# Patient Record
Sex: FEMALE | Race: WHITE | NOT HISPANIC OR LATINO | Employment: UNEMPLOYED | URBAN - METROPOLITAN AREA
[De-identification: names, ages, dates, MRNs, and addresses within clinical notes are randomized per-mention and may not be internally consistent; named-entity substitution may affect disease eponyms.]

---

## 2017-01-30 ENCOUNTER — HOSPITAL ENCOUNTER (EMERGENCY)
Facility: HOSPITAL | Age: 27
Discharge: HOME | End: 2017-01-31
Attending: EMERGENCY MEDICINE | Admitting: EMERGENCY MEDICINE

## 2017-01-30 ENCOUNTER — APPOINTMENT (EMERGENCY)
Dept: CT IMAGING | Facility: HOSPITAL | Age: 27
End: 2017-01-30

## 2017-01-30 DIAGNOSIS — S39.012A BACK STRAIN, INITIAL ENCOUNTER: ICD-10-CM

## 2017-01-30 DIAGNOSIS — S16.1XXA CERVICAL STRAIN, INITIAL ENCOUNTER: ICD-10-CM

## 2017-01-30 DIAGNOSIS — S06.0X9A CONCUSSION, WITH LOSS OF CONSCIOUSNESS OF UNSPECIFIED DURATION, INITIAL ENCOUNTER: Primary | ICD-10-CM
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
## 2017-01-30 LAB
ALBUMIN SERPL BCP-MCNC: 3.6 G/DL (ref 3.5–5)
ALP SERPL-CCNC: 82 U/L (ref 46–116)
ALT SERPL W P-5'-P-CCNC: 23 U/L (ref 12–78)
ANION GAP SERPL CALCULATED.3IONS-SCNC: 8 MMOL/L (ref 4–13)
AST SERPL W P-5'-P-CCNC: 23 U/L (ref 5–45)
BASOPHILS # BLD AUTO: 0.06 THOUSANDS/ΜL (ref 0–0.1)
BASOPHILS NFR BLD AUTO: 1 % (ref 0–1)
BILIRUB SERPL-MCNC: 0.2 MG/DL (ref 0.2–1)
BUN SERPL-MCNC: 11 MG/DL (ref 5–25)
CALCIUM SERPL-MCNC: 8.9 MG/DL (ref 8.3–10.1)
CHLORIDE SERPL-SCNC: 104 MMOL/L (ref 100–108)
CO2 SERPL-SCNC: 28 MMOL/L (ref 21–32)
CREAT SERPL-MCNC: 0.74 MG/DL (ref 0.6–1.3)
EOSINOPHIL # BLD AUTO: 0.64 THOUSAND/ΜL (ref 0–0.61)
EOSINOPHIL NFR BLD AUTO: 8 % (ref 0–6)
ERYTHROCYTE [DISTWIDTH] IN BLOOD BY AUTOMATED COUNT: 12.5 % (ref 11.6–15.1)
GFR SERPL CREATININE-BSD FRML MDRD: >60 ML/MIN/1.73SQ M
GLUCOSE SERPL-MCNC: 85 MG/DL (ref 65–140)
HCT VFR BLD AUTO: 38 % (ref 34.8–46.1)
HGB BLD-MCNC: 12.9 G/DL (ref 11.5–15.4)
LYMPHOCYTES # BLD AUTO: 2.75 THOUSANDS/ΜL (ref 0.6–4.47)
LYMPHOCYTES NFR BLD AUTO: 33 % (ref 14–44)
MCH RBC QN AUTO: 30.8 PG (ref 26.8–34.3)
MCHC RBC AUTO-ENTMCNC: 33.9 G/DL (ref 31.4–37.4)
MCV RBC AUTO: 91 FL (ref 82–98)
MONOCYTES # BLD AUTO: 0.64 THOUSAND/ΜL (ref 0.17–1.22)
MONOCYTES NFR BLD AUTO: 8 % (ref 4–12)
NEUTROPHILS # BLD AUTO: 4.18 THOUSANDS/ΜL (ref 1.85–7.62)
NEUTS SEG NFR BLD AUTO: 50 % (ref 43–75)
PLATELET # BLD AUTO: 313 THOUSANDS/UL (ref 149–390)
PMV BLD AUTO: 10 FL (ref 8.9–12.7)
POTASSIUM SERPL-SCNC: 3.4 MMOL/L (ref 3.5–5.3)
PROT SERPL-MCNC: 6.7 G/DL (ref 6.4–8.2)
RBC # BLD AUTO: 4.19 MILLION/UL (ref 3.81–5.12)
SODIUM SERPL-SCNC: 140 MMOL/L (ref 136–145)
WBC # BLD AUTO: 8.27 THOUSAND/UL (ref 4.31–10.16)

## 2017-01-30 PROCEDURE — 72128 CT CHEST SPINE W/O DYE: CPT

## 2017-01-30 PROCEDURE — 81025 URINE PREGNANCY TEST: CPT | Performed by: EMERGENCY MEDICINE

## 2017-01-30 PROCEDURE — 85025 COMPLETE CBC W/AUTO DIFF WBC: CPT | Performed by: EMERGENCY MEDICINE

## 2017-01-30 PROCEDURE — 84702 CHORIONIC GONADOTROPIN TEST: CPT | Performed by: EMERGENCY MEDICINE

## 2017-01-30 PROCEDURE — 72125 CT NECK SPINE W/O DYE: CPT

## 2017-01-30 PROCEDURE — 70450 CT HEAD/BRAIN W/O DYE: CPT

## 2017-01-30 PROCEDURE — 96361 HYDRATE IV INFUSION ADD-ON: CPT

## 2017-01-30 PROCEDURE — 72131 CT LUMBAR SPINE W/O DYE: CPT

## 2017-01-30 PROCEDURE — 96375 TX/PRO/DX INJ NEW DRUG ADDON: CPT

## 2017-01-30 PROCEDURE — 80053 COMPREHEN METABOLIC PANEL: CPT | Performed by: EMERGENCY MEDICINE

## 2017-01-30 PROCEDURE — 96374 THER/PROPH/DIAG INJ IV PUSH: CPT

## 2017-01-30 PROCEDURE — 36415 COLL VENOUS BLD VENIPUNCTURE: CPT | Performed by: EMERGENCY MEDICINE

## 2017-01-30 RX ORDER — CLONAZEPAM 1 MG/1
1 TABLET ORAL
COMMUNITY
End: 2018-11-05 | Stop reason: DRUGHIGH

## 2017-01-30 RX ORDER — ONDANSETRON 2 MG/ML
4 INJECTION INTRAMUSCULAR; INTRAVENOUS ONCE
Status: COMPLETED | OUTPATIENT
Start: 2017-01-30 | End: 2017-01-30

## 2017-01-30 RX ORDER — CLONAZEPAM 0.5 MG/1
0.5 TABLET ORAL DAILY PRN
COMMUNITY
End: 2019-02-19

## 2017-01-30 RX ORDER — MULTIVITAMIN
1 TABLET ORAL DAILY
COMMUNITY
End: 2018-11-05 | Stop reason: ALTCHOICE

## 2017-01-30 RX ORDER — MORPHINE SULFATE 2 MG/ML
2 INJECTION, SOLUTION INTRAMUSCULAR; INTRAVENOUS ONCE
Status: COMPLETED | OUTPATIENT
Start: 2017-01-30 | End: 2017-01-30

## 2017-01-30 RX ADMIN — ONDANSETRON 4 MG: 2 INJECTION INTRAMUSCULAR; INTRAVENOUS at 23:39

## 2017-01-30 RX ADMIN — MORPHINE SULFATE 2 MG: 2 INJECTION, SOLUTION INTRAMUSCULAR; INTRAVENOUS at 23:40

## 2017-01-30 RX ADMIN — SODIUM CHLORIDE 1000 ML: 0.9 INJECTION, SOLUTION INTRAVENOUS at 23:39

## 2017-01-31 VITALS
WEIGHT: 160.94 LBS | TEMPERATURE: 98 F | OXYGEN SATURATION: 100 % | RESPIRATION RATE: 18 BRPM | BODY MASS INDEX: 27.48 KG/M2 | SYSTOLIC BLOOD PRESSURE: 128 MMHG | HEART RATE: 88 BPM | HEIGHT: 64 IN | DIASTOLIC BLOOD PRESSURE: 80 MMHG

## 2017-01-31 LAB
AMPHETAMINES SERPL QL SCN: POSITIVE
B-HCG SERPL-ACNC: <2 MIU/ML
BACTERIA UR QL AUTO: ABNORMAL /HPF
BARBITURATES UR QL: NEGATIVE
BENZODIAZ UR QL: POSITIVE
BILIRUB UR QL STRIP: ABNORMAL
CLARITY UR: ABNORMAL
COCAINE UR QL: NEGATIVE
COLOR UR: YELLOW
GLUCOSE UR STRIP-MCNC: NEGATIVE MG/DL
HCG UR QL: NEGATIVE
HGB UR QL STRIP.AUTO: ABNORMAL
KETONES UR STRIP-MCNC: ABNORMAL MG/DL
LEUKOCYTE ESTERASE UR QL STRIP: NEGATIVE
METHADONE UR QL: NEGATIVE
MUCOUS THREADS UR QL AUTO: ABNORMAL
NITRITE UR QL STRIP: NEGATIVE
NON-SQ EPI CELLS URNS QL MICRO: ABNORMAL /HPF
OPIATES UR QL SCN: POSITIVE
PCP UR QL: NEGATIVE
PH UR STRIP.AUTO: 6 [PH] (ref 4.5–8)
PROT UR STRIP-MCNC: NEGATIVE MG/DL
RBC #/AREA URNS AUTO: ABNORMAL /HPF
SP GR UR STRIP.AUTO: >=1.03 (ref 1–1.03)
THC UR QL: NEGATIVE
UROBILINOGEN UR QL STRIP.AUTO: 0.2 E.U./DL
WBC #/AREA URNS AUTO: ABNORMAL /HPF

## 2017-01-31 PROCEDURE — 99284 EMERGENCY DEPT VISIT MOD MDM: CPT

## 2017-01-31 PROCEDURE — 96376 TX/PRO/DX INJ SAME DRUG ADON: CPT

## 2017-01-31 PROCEDURE — 96375 TX/PRO/DX INJ NEW DRUG ADDON: CPT

## 2017-01-31 PROCEDURE — 87086 URINE CULTURE/COLONY COUNT: CPT | Performed by: EMERGENCY MEDICINE

## 2017-01-31 PROCEDURE — 80307 DRUG TEST PRSMV CHEM ANLYZR: CPT | Performed by: EMERGENCY MEDICINE

## 2017-01-31 PROCEDURE — 81001 URINALYSIS AUTO W/SCOPE: CPT | Performed by: EMERGENCY MEDICINE

## 2017-01-31 RX ORDER — NALOXONE HYDROCHLORIDE 0.4 MG/ML
INJECTION, SOLUTION INTRAMUSCULAR; INTRAVENOUS; SUBCUTANEOUS
Status: DISCONTINUED
Start: 2017-01-31 | End: 2017-01-31 | Stop reason: WASHOUT

## 2017-01-31 RX ORDER — ONDANSETRON 2 MG/ML
4 INJECTION INTRAMUSCULAR; INTRAVENOUS ONCE
Status: COMPLETED | OUTPATIENT
Start: 2017-01-31 | End: 2017-01-31

## 2017-01-31 RX ORDER — NALOXONE HYDROCHLORIDE 1 MG/ML
2 INJECTION PARENTERAL ONCE
Status: COMPLETED | OUTPATIENT
Start: 2017-01-31 | End: 2017-01-31

## 2017-01-31 RX ORDER — ONDANSETRON 2 MG/ML
INJECTION INTRAMUSCULAR; INTRAVENOUS
Status: COMPLETED
Start: 2017-01-31 | End: 2017-01-31

## 2017-01-31 RX ORDER — NALOXONE HYDROCHLORIDE 1 MG/ML
INJECTION INTRAMUSCULAR; INTRAVENOUS; SUBCUTANEOUS
Status: DISCONTINUED
Start: 2017-01-31 | End: 2017-01-31 | Stop reason: HOSPADM

## 2017-01-31 RX ORDER — NAPROXEN SODIUM 550 MG/1
550 TABLET ORAL 2 TIMES DAILY WITH MEALS
Qty: 20 TABLET | Refills: 0 | Status: SHIPPED | OUTPATIENT
Start: 2017-01-31 | End: 2018-12-01

## 2017-01-31 RX ORDER — METOCLOPRAMIDE 10 MG/1
10 TABLET ORAL 4 TIMES DAILY
Qty: 28 TABLET | Refills: 0 | Status: SHIPPED | OUTPATIENT
Start: 2017-01-31 | End: 2017-02-07

## 2017-01-31 RX ORDER — CLONAZEPAM 0.5 MG/1
1 TABLET ORAL ONCE
Status: COMPLETED | OUTPATIENT
Start: 2017-01-31 | End: 2017-01-31

## 2017-01-31 RX ADMIN — ONDANSETRON 4 MG: 2 INJECTION INTRAMUSCULAR; INTRAVENOUS at 01:28

## 2017-01-31 RX ADMIN — CLONAZEPAM 1 MG: 0.5 TABLET ORAL at 00:29

## 2017-01-31 RX ADMIN — NALOXONE HYDROCHLORIDE 2 MG: 1 INJECTION PARENTERAL at 01:15

## 2017-02-01 LAB — BACTERIA UR CULT: NORMAL

## 2017-02-22 ENCOUNTER — APPOINTMENT (OUTPATIENT)
Dept: LAB | Facility: MEDICAL CENTER | Age: 27
End: 2017-02-22
Attending: PHYSICIAN ASSISTANT

## 2017-02-22 ENCOUNTER — TRANSCRIBE ORDERS (OUTPATIENT)
Dept: URGENT CARE | Facility: MEDICAL CENTER | Age: 27
End: 2017-02-22

## 2017-02-22 DIAGNOSIS — Z00.8 HEALTH EXAMINATION IN POPULATION SURVEY: Primary | ICD-10-CM

## 2017-02-22 DIAGNOSIS — Z00.8 HEALTH EXAMINATION IN POPULATION SURVEY: ICD-10-CM

## 2017-02-22 LAB — RUBV IGG SERPL IA-ACNC: 174.7 IU/ML

## 2017-02-22 PROCEDURE — 86787 VARICELLA-ZOSTER ANTIBODY: CPT

## 2017-02-22 PROCEDURE — 86735 MUMPS ANTIBODY: CPT

## 2017-02-22 PROCEDURE — 86765 RUBEOLA ANTIBODY: CPT

## 2017-02-22 PROCEDURE — 86762 RUBELLA ANTIBODY: CPT

## 2017-02-22 PROCEDURE — 86480 TB TEST CELL IMMUN MEASURE: CPT

## 2017-02-22 PROCEDURE — 36415 COLL VENOUS BLD VENIPUNCTURE: CPT

## 2017-02-23 LAB
MEV IGG SER QL: NORMAL
MUV IGG SER QL: NORMAL
VZV IGG SER IA-ACNC: NORMAL

## 2017-02-27 LAB — QUANTIFERON-TB GOLD IN TUBE: NORMAL

## 2017-03-22 ENCOUNTER — HOSPITAL ENCOUNTER (EMERGENCY)
Facility: HOSPITAL | Age: 27
Discharge: HOME | End: 2017-03-22
Attending: EMERGENCY MEDICINE | Admitting: EMERGENCY MEDICINE
Payer: COMMERCIAL

## 2017-03-22 VITALS
RESPIRATION RATE: 20 BRPM | DIASTOLIC BLOOD PRESSURE: 60 MMHG | HEART RATE: 113 BPM | BODY MASS INDEX: 25.06 KG/M2 | OXYGEN SATURATION: 99 % | TEMPERATURE: 98.4 F | SYSTOLIC BLOOD PRESSURE: 129 MMHG | WEIGHT: 146 LBS

## 2017-03-22 DIAGNOSIS — L02.01 FACIAL ABSCESS: Primary | ICD-10-CM

## 2017-03-22 PROCEDURE — 99283 EMERGENCY DEPT VISIT LOW MDM: CPT

## 2017-03-22 RX ORDER — LIDOCAINE HYDROCHLORIDE AND EPINEPHRINE 10; 10 MG/ML; UG/ML
1 INJECTION, SOLUTION INFILTRATION; PERINEURAL ONCE
Status: COMPLETED | OUTPATIENT
Start: 2017-03-22 | End: 2017-03-22

## 2017-03-22 RX ORDER — CLINDAMYCIN HYDROCHLORIDE 300 MG/1
300 CAPSULE ORAL 3 TIMES DAILY
Qty: 30 CAPSULE | Refills: 0 | Status: SHIPPED | OUTPATIENT
Start: 2017-03-22 | End: 2017-04-01

## 2017-03-22 RX ORDER — ACETAMINOPHEN 325 MG/1
650 TABLET ORAL ONCE
Status: COMPLETED | OUTPATIENT
Start: 2017-03-22 | End: 2017-03-22

## 2017-03-22 RX ORDER — IBUPROFEN 600 MG/1
600 TABLET ORAL ONCE
Status: COMPLETED | OUTPATIENT
Start: 2017-03-22 | End: 2017-03-22

## 2017-03-22 RX ADMIN — LIDOCAINE HYDROCHLORIDE AND EPINEPHRINE 1 ML: 10; 10 INJECTION, SOLUTION INFILTRATION; PERINEURAL at 19:03

## 2017-03-22 RX ADMIN — IBUPROFEN 600 MG: 600 TABLET ORAL at 18:40

## 2017-03-22 RX ADMIN — ACETAMINOPHEN 650 MG: 325 TABLET ORAL at 18:40

## 2017-03-27 ENCOUNTER — HOSPITAL ENCOUNTER (EMERGENCY)
Facility: HOSPITAL | Age: 27
Discharge: HOME | End: 2017-03-27
Attending: EMERGENCY MEDICINE | Admitting: EMERGENCY MEDICINE
Payer: COMMERCIAL

## 2017-03-27 VITALS
OXYGEN SATURATION: 100 % | SYSTOLIC BLOOD PRESSURE: 125 MMHG | RESPIRATION RATE: 18 BRPM | DIASTOLIC BLOOD PRESSURE: 91 MMHG | HEART RATE: 87 BPM

## 2017-03-27 DIAGNOSIS — L02.01 FACIAL ABSCESS: Primary | ICD-10-CM

## 2017-03-27 DIAGNOSIS — Z09 ENCOUNTER FOR RECHECK OF ABSCESS FOLLOWING INCISION AND DRAINAGE: ICD-10-CM

## 2017-03-27 LAB — HCG UR QL: NEGATIVE

## 2017-03-27 PROCEDURE — 99283 EMERGENCY DEPT VISIT LOW MDM: CPT

## 2017-03-27 PROCEDURE — 81025 URINE PREGNANCY TEST: CPT | Performed by: EMERGENCY MEDICINE

## 2017-03-27 RX ORDER — LEVONORGESTREL 1.5 MG/1
1.5 TABLET ORAL ONCE
Qty: 1 TABLET | Refills: 0 | Status: SHIPPED | OUTPATIENT
Start: 2017-03-27 | End: 2017-03-27

## 2017-07-24 ENCOUNTER — OFFICE VISIT (OUTPATIENT)
Dept: URGENT CARE | Facility: MEDICAL CENTER | Age: 27
End: 2017-07-24
Payer: COMMERCIAL

## 2017-07-24 DIAGNOSIS — J02.9 ACUTE PHARYNGITIS: ICD-10-CM

## 2017-07-24 PROCEDURE — 99283 EMERGENCY DEPT VISIT LOW MDM: CPT

## 2017-07-24 PROCEDURE — G0382 LEV 3 HOSP TYPE B ED VISIT: HCPCS

## 2017-12-07 ENCOUNTER — OFFICE VISIT (OUTPATIENT)
Dept: URGENT CARE | Facility: MEDICAL CENTER | Age: 27
End: 2017-12-07
Payer: COMMERCIAL

## 2017-12-07 PROCEDURE — 99283 EMERGENCY DEPT VISIT LOW MDM: CPT

## 2017-12-07 PROCEDURE — G0382 LEV 3 HOSP TYPE B ED VISIT: HCPCS
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
## 2018-01-15 ENCOUNTER — OFFICE VISIT (OUTPATIENT)
Dept: URGENT CARE | Facility: MEDICAL CENTER | Age: 28
End: 2018-01-15
Payer: COMMERCIAL

## 2018-01-15 PROCEDURE — 99213 OFFICE O/P EST LOW 20 MIN: CPT

## 2018-01-15 PROCEDURE — S9088 SERVICES PROVIDED IN URGENT: HCPCS
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
## 2018-01-23 VITALS
WEIGHT: 175 LBS | SYSTOLIC BLOOD PRESSURE: 110 MMHG | DIASTOLIC BLOOD PRESSURE: 62 MMHG | RESPIRATION RATE: 20 BRPM | HEART RATE: 112 BPM | RESPIRATION RATE: 18 BRPM | SYSTOLIC BLOOD PRESSURE: 110 MMHG | BODY MASS INDEX: 30.04 KG/M2 | DIASTOLIC BLOOD PRESSURE: 70 MMHG | TEMPERATURE: 98.1 F | BODY MASS INDEX: 30.04 KG/M2 | HEART RATE: 102 BPM | WEIGHT: 175 LBS | TEMPERATURE: 98 F

## 2018-01-24 NOTE — MISCELLANEOUS
Message  Return to work or school:   Matias George is under my professional care  She was seen in my office on 12-7-17   She is able to return to work on  12-8-17       Nolvia Oh PA-C        Signatures   Electronically signed by : Yordy Jaquez, [facility]; Dec  7 2017  9:57PM EST                       (Author)

## 2018-01-24 NOTE — MISCELLANEOUS
Message  Return to work or school:   April Sanchez is under my professional care  She was seen in my office on 1/15/18   She is able to return to work on  1/17/18       excuse due to illness        Signatures   Electronically signed by : Yvette Luna, [facility]; William 15 2018 10:31PM EST                       (Author)

## 2018-09-06 ENCOUNTER — TRANSCRIBE ORDERS (OUTPATIENT)
Dept: LAB | Facility: HOSPITAL | Age: 28
End: 2018-09-06

## 2018-09-06 ENCOUNTER — APPOINTMENT (OUTPATIENT)
Dept: LAB | Facility: HOSPITAL | Age: 28
End: 2018-09-06
Payer: COMMERCIAL

## 2018-09-06 DIAGNOSIS — Z00.8 HEALTH EXAMINATION IN POPULATION SURVEY: Primary | ICD-10-CM

## 2018-09-06 DIAGNOSIS — Z00.8 HEALTH EXAMINATION IN POPULATION SURVEY: ICD-10-CM

## 2018-09-06 LAB
CHOLEST SERPL-MCNC: 164 MG/DL (ref 50–200)
EST. AVERAGE GLUCOSE BLD GHB EST-MCNC: 94 MG/DL
HBA1C MFR BLD: 4.9 % (ref 4.2–6.3)
HDLC SERPL-MCNC: 45 MG/DL (ref 40–60)
LDLC SERPL CALC-MCNC: 99 MG/DL (ref 0–100)
NONHDLC SERPL-MCNC: 119 MG/DL
TRIGL SERPL-MCNC: 100 MG/DL

## 2018-09-06 PROCEDURE — 80061 LIPID PANEL: CPT

## 2018-09-06 PROCEDURE — 36415 COLL VENOUS BLD VENIPUNCTURE: CPT

## 2018-09-06 PROCEDURE — 83036 HEMOGLOBIN GLYCOSYLATED A1C: CPT

## 2018-10-25 ENCOUNTER — TELEPHONE (OUTPATIENT)
Dept: OBGYN CLINIC | Facility: CLINIC | Age: 28
End: 2018-10-25

## 2018-10-25 DIAGNOSIS — N91.2 AMENORRHEA: Primary | ICD-10-CM

## 2018-10-25 NOTE — TELEPHONE ENCOUNTER
NP prenatal states she had an  in   States she has not had a period since  She had unprotected intercourse X1  She did UPT 10/18 and 10/23 both positive  Per Dr Austin Lowe patient to have HCG done prior to appointment  Labs ordered patient will go for HCG today or tomorrow  Will call with results

## 2018-10-26 ENCOUNTER — LAB (OUTPATIENT)
Dept: LAB | Facility: HOSPITAL | Age: 28
End: 2018-10-26
Attending: OBSTETRICS & GYNECOLOGY
Payer: COMMERCIAL

## 2018-10-26 DIAGNOSIS — N91.2 AMENORRHEA: ICD-10-CM

## 2018-10-26 LAB — B-HCG SERPL-ACNC: 8956 MIU/ML

## 2018-10-26 PROCEDURE — 36415 COLL VENOUS BLD VENIPUNCTURE: CPT

## 2018-10-26 PROCEDURE — 84702 CHORIONIC GONADOTROPIN TEST: CPT

## 2018-10-29 DIAGNOSIS — Z34.01 ENCOUNTER FOR SUPERVISION OF NORMAL FIRST PREGNANCY IN FIRST TRIMESTER: Primary | ICD-10-CM

## 2018-11-02 ENCOUNTER — HOSPITAL ENCOUNTER (OUTPATIENT)
Dept: ULTRASOUND IMAGING | Facility: HOSPITAL | Age: 28
Discharge: HOME | End: 2018-11-02
Attending: OBSTETRICS & GYNECOLOGY

## 2018-11-02 DIAGNOSIS — Z34.01 ENCOUNTER FOR SUPERVISION OF NORMAL FIRST PREGNANCY IN FIRST TRIMESTER: ICD-10-CM

## 2018-11-05 ENCOUNTER — TELEPHONE (OUTPATIENT)
Dept: OBGYN CLINIC | Facility: CLINIC | Age: 28
End: 2018-11-05

## 2018-11-05 ENCOUNTER — INITIAL PRENATAL (OUTPATIENT)
Dept: OBGYN CLINIC | Facility: CLINIC | Age: 28
End: 2018-11-05

## 2018-11-05 VITALS
BODY MASS INDEX: 29.57 KG/M2 | RESPIRATION RATE: 20 BRPM | HEIGHT: 64 IN | SYSTOLIC BLOOD PRESSURE: 132 MMHG | DIASTOLIC BLOOD PRESSURE: 84 MMHG | HEART RATE: 96 BPM | WEIGHT: 173.2 LBS

## 2018-11-05 DIAGNOSIS — Z34.02 ENCOUNTER FOR SUPERVISION OF NORMAL FIRST PREGNANCY IN SECOND TRIMESTER: Primary | ICD-10-CM

## 2018-11-05 DIAGNOSIS — Z3A.17 17 WEEKS GESTATION OF PREGNANCY: ICD-10-CM

## 2018-11-05 PROCEDURE — OBC: Performed by: OBSTETRICS & GYNECOLOGY

## 2018-11-05 RX ORDER — METHADONE HYDROCHLORIDE 5 MG/5ML
43 SOLUTION ORAL DAILY
COMMUNITY
End: 2020-07-15

## 2018-11-05 RX ORDER — DEXTROAMPHETAMINE SACCHARATE, AMPHETAMINE ASPARTATE, DEXTROAMPHETAMINE SULFATE AND AMPHETAMINE SULFATE 7.5; 7.5; 7.5; 7.5 MG/1; MG/1; MG/1; MG/1
30 TABLET ORAL DAILY
COMMUNITY
Start: 2014-07-21 | End: 2019-12-18

## 2018-11-05 RX ORDER — ATOMOXETINE 60 MG/1
60 CAPSULE ORAL DAILY
COMMUNITY
End: 2018-12-01

## 2018-11-05 NOTE — PROGRESS NOTES
OB intake complete - G1  LMP  18   ARCELIA 4/10/19  Patient had medical  18 at [facility] in [facility]  She states she had injection and vaginal medication  Will fill out release of information to get records  Prenatal labs ordered including - prenatal panel, varicella, and ultrasound at [facility]  S/S of pregnancy - fatigue  Genetic screening reviewed -- patient is already approx 17 wks  PHQ 9 screening results - 0 - Patient aware of baby and me support center with counseling, Declined at present    Unplanned pregnancy, FOB involved and supportive   Last Pap - unknown

## 2018-11-05 NOTE — TELEPHONE ENCOUNTER
NP States she went for ultrasound on Friday and was told she is 16wk pregnant  States she attempted , but it didn't work  She was given an injection methotrexate and cytotec for   She states she was bleeding after   She did miss her follow up appointment  She is scheduled for intake today  Routing to provider for advise

## 2018-11-05 NOTE — TELEPHONE ENCOUNTER
Advised patient to keep appointment will give script for early anatomy scan at [facility]  Verbalized understanding

## 2018-11-05 NOTE — TELEPHONE ENCOUNTER
OB intake complete - G1  LMP  18   ARCELIA 4/10/19  Patient had medical  18 at [facility] in [facility]  She states she had injection and vaginal medication  Will fill out release of information to get records  Patient has history of Percocet  abuse  Currently taking Methadone 85 mg  Patient also taking Klonopin, adderall, and strattera  Routing to provider to review and advise

## 2018-11-06 ENCOUNTER — TELEPHONE (OUTPATIENT)
Dept: OBGYN CLINIC | Facility: CLINIC | Age: 28
End: 2018-11-06

## 2018-11-06 ENCOUNTER — APPOINTMENT (OUTPATIENT)
Dept: LAB | Facility: HOSPITAL | Age: 28
End: 2018-11-06
Attending: OBSTETRICS & GYNECOLOGY
Payer: COMMERCIAL

## 2018-11-06 ENCOUNTER — ULTRASOUND (OUTPATIENT)
Dept: PERINATAL CARE | Facility: CLINIC | Age: 28
End: 2018-11-06
Payer: COMMERCIAL

## 2018-11-06 VITALS
HEART RATE: 106 BPM | WEIGHT: 169.6 LBS | DIASTOLIC BLOOD PRESSURE: 76 MMHG | SYSTOLIC BLOOD PRESSURE: 108 MMHG | BODY MASS INDEX: 28.95 KG/M2 | HEIGHT: 64 IN

## 2018-11-06 DIAGNOSIS — Z34.02 ENCOUNTER FOR SUPERVISION OF NORMAL FIRST PREGNANCY IN SECOND TRIMESTER: ICD-10-CM

## 2018-11-06 DIAGNOSIS — F11.20 METHADONE MAINTENANCE TREATMENT AFFECTING PREGNANCY IN SECOND TRIMESTER (HCC): ICD-10-CM

## 2018-11-06 DIAGNOSIS — Z34.02 ENCOUNTER FOR SUPERVISION OF NORMAL FIRST PREGNANCY IN SECOND TRIMESTER: Primary | ICD-10-CM

## 2018-11-06 DIAGNOSIS — Z87.898 HISTORY OF RECREATIONAL DRUG USE: ICD-10-CM

## 2018-11-06 DIAGNOSIS — O35.9XX0 TERATOGEN EXPOSURE IN CURRENT PREGNANCY, SINGLE OR UNSPECIFIED FETUS: Primary | ICD-10-CM

## 2018-11-06 DIAGNOSIS — O99.322 METHADONE MAINTENANCE TREATMENT AFFECTING PREGNANCY IN SECOND TRIMESTER (HCC): ICD-10-CM

## 2018-11-06 DIAGNOSIS — Z3A.16 16 WEEKS GESTATION OF PREGNANCY: ICD-10-CM

## 2018-11-06 LAB
ABO GROUP BLD: NORMAL
AMPHETAMINES SERPL QL SCN: POSITIVE
BACTERIA UR QL AUTO: ABNORMAL /HPF
BARBITURATES UR QL: NEGATIVE
BASOPHILS # BLD AUTO: 0.03 THOUSANDS/ΜL (ref 0–0.1)
BASOPHILS NFR BLD AUTO: 1 % (ref 0–1)
BENZODIAZ UR QL: NEGATIVE
BILIRUB UR QL STRIP: ABNORMAL
BLD GP AB SCN SERPL QL: NEGATIVE
CAOX CRY URNS QL MICRO: ABNORMAL /HPF
CLARITY UR: ABNORMAL
COCAINE UR QL: NEGATIVE
COLOR UR: ABNORMAL
EOSINOPHIL # BLD AUTO: 0.01 THOUSAND/ΜL (ref 0–0.61)
EOSINOPHIL NFR BLD AUTO: 0 % (ref 0–6)
ERYTHROCYTE [DISTWIDTH] IN BLOOD BY AUTOMATED COUNT: 13.5 % (ref 11.6–15.1)
GLUCOSE UR STRIP-MCNC: NEGATIVE MG/DL
HCT VFR BLD AUTO: 40.2 % (ref 34.8–46.1)
HGB BLD-MCNC: 13.2 G/DL (ref 11.5–15.4)
HGB UR QL STRIP.AUTO: NEGATIVE
IMM GRANULOCYTES # BLD AUTO: 0.01 THOUSAND/UL (ref 0–0.2)
IMM GRANULOCYTES NFR BLD AUTO: 0 % (ref 0–2)
KETONES UR STRIP-MCNC: ABNORMAL MG/DL
LEUKOCYTE ESTERASE UR QL STRIP: NEGATIVE
LYMPHOCYTES # BLD AUTO: 2.01 THOUSANDS/ΜL (ref 0.6–4.47)
LYMPHOCYTES NFR BLD AUTO: 35 % (ref 14–44)
MCH RBC QN AUTO: 29.6 PG (ref 26.8–34.3)
MCHC RBC AUTO-ENTMCNC: 32.8 G/DL (ref 31.4–37.4)
MCV RBC AUTO: 90 FL (ref 82–98)
METHADONE UR QL: POSITIVE
MONOCYTES # BLD AUTO: 0.4 THOUSAND/ΜL (ref 0.17–1.22)
MONOCYTES NFR BLD AUTO: 7 % (ref 4–12)
MUCOUS THREADS UR QL AUTO: ABNORMAL
NEUTROPHILS # BLD AUTO: 3.3 THOUSANDS/ΜL (ref 1.85–7.62)
NEUTS SEG NFR BLD AUTO: 57 % (ref 43–75)
NITRITE UR QL STRIP: NEGATIVE
NON-SQ EPI CELLS URNS QL MICRO: ABNORMAL /HPF
NRBC BLD AUTO-RTO: 0 /100 WBCS
OPIATES UR QL SCN: POSITIVE
PCP UR QL: NEGATIVE
PH UR STRIP.AUTO: 5.5 [PH] (ref 4.5–8)
PLATELET # BLD AUTO: 294 THOUSANDS/UL (ref 149–390)
PMV BLD AUTO: 9.9 FL (ref 8.9–12.7)
PROT UR STRIP-MCNC: ABNORMAL MG/DL
RBC # BLD AUTO: 4.46 MILLION/UL (ref 3.81–5.12)
RBC #/AREA URNS AUTO: ABNORMAL /HPF
RH BLD: POSITIVE
RUBV IGG SERPL IA-ACNC: >175 IU/ML
SP GR UR STRIP.AUTO: 1.03 (ref 1–1.03)
SPECIMEN EXPIRATION DATE: NORMAL
THC UR QL: NEGATIVE
UROBILINOGEN UR QL STRIP.AUTO: 1 E.U./DL
WBC # BLD AUTO: 5.76 THOUSAND/UL (ref 4.31–10.16)
WBC #/AREA URNS AUTO: ABNORMAL /HPF

## 2018-11-06 PROCEDURE — 36415 COLL VENOUS BLD VENIPUNCTURE: CPT

## 2018-11-06 PROCEDURE — 76811 OB US DETAILED SNGL FETUS: CPT | Performed by: OBSTETRICS & GYNECOLOGY

## 2018-11-06 PROCEDURE — 87086 URINE CULTURE/COLONY COUNT: CPT

## 2018-11-06 PROCEDURE — 99242 OFF/OP CONSLTJ NEW/EST SF 20: CPT | Performed by: OBSTETRICS & GYNECOLOGY

## 2018-11-06 PROCEDURE — 81001 URINALYSIS AUTO W/SCOPE: CPT

## 2018-11-06 PROCEDURE — 80307 DRUG TEST PRSMV CHEM ANLYZR: CPT

## 2018-11-06 PROCEDURE — 86787 VARICELLA-ZOSTER ANTIBODY: CPT

## 2018-11-06 PROCEDURE — 80081 OBSTETRIC PANEL INC HIV TSTG: CPT

## 2018-11-06 NOTE — LETTER
November 11, 2018     [address] [facility] MD [address]  [place]    Patient: Sumanth Rodríguez   YOB: 1990   Date of Visit: 11/6/2018       Dear Dr Salas Slot: Thank you for referring Sonam Arreola to me for evaluation  Below are my notes for this consultation  If you have questions, please do not hesitate to call me  I look forward to following your patient along with you  Sincerely,        Cheryl Teague MD        CC: No Recipients  Cheyrl Teague MD  11/6/2018  3:15 PM  Sign at close encounter  Please refer to the [facility] ultrasound report in Ob Procedures for additional information regarding the visit to the [facility]  today

## 2018-11-06 NOTE — TELEPHONE ENCOUNTER
Spoke with patient states she left message at [facility] to schedule appointment  Advised I am adding UDS to blood work she can complete when she does the rest  Verbalized understanding

## 2018-11-06 NOTE — PROGRESS NOTES
Please refer to the [facility] ultrasound report in Ob Procedures for additional information regarding the visit to the [facility]  today

## 2018-11-07 LAB
HBV SURFACE AG SER QL: NORMAL
HIV 1+2 AB+HIV1 P24 AG SERPL QL IA: NORMAL
RPR SER QL: NORMAL

## 2018-11-08 LAB
BACTERIA UR CULT: NORMAL
VZV IGG SER IA-ACNC: NORMAL

## 2018-11-09 ENCOUNTER — TELEPHONE (OUTPATIENT)
Dept: OBGYN CLINIC | Facility: CLINIC | Age: 28
End: 2018-11-09

## 2018-11-09 ENCOUNTER — ROUTINE PRENATAL (OUTPATIENT)
Dept: OBGYN CLINIC | Facility: CLINIC | Age: 28
End: 2018-11-09

## 2018-11-09 VITALS — SYSTOLIC BLOOD PRESSURE: 118 MMHG | DIASTOLIC BLOOD PRESSURE: 68 MMHG | BODY MASS INDEX: 28.94 KG/M2 | WEIGHT: 168.6 LBS

## 2018-11-09 DIAGNOSIS — Z3A.17 17 WEEKS GESTATION OF PREGNANCY: ICD-10-CM

## 2018-11-09 DIAGNOSIS — Z12.4 CERVICAL CANCER SCREENING: ICD-10-CM

## 2018-11-09 DIAGNOSIS — Z11.3 SCREENING FOR STD (SEXUALLY TRANSMITTED DISEASE): ICD-10-CM

## 2018-11-09 DIAGNOSIS — O35.9XX0 TERATOGEN EXPOSURE IN CURRENT PREGNANCY, SINGLE OR UNSPECIFIED FETUS: ICD-10-CM

## 2018-11-09 DIAGNOSIS — Z34.02 ENCOUNTER FOR SUPERVISION OF NORMAL FIRST PREGNANCY IN SECOND TRIMESTER: Primary | ICD-10-CM

## 2018-11-09 DIAGNOSIS — F19.20 DRUG ABUSE AND DEPENDENCE (HCC): ICD-10-CM

## 2018-11-09 PROCEDURE — PNV: Performed by: OBSTETRICS & GYNECOLOGY

## 2018-11-09 PROCEDURE — 87491 CHLMYD TRACH DNA AMP PROBE: CPT | Performed by: OBSTETRICS & GYNECOLOGY

## 2018-11-09 PROCEDURE — G0145 SCR C/V CYTO,THINLAYER,RESCR: HCPCS | Performed by: OBSTETRICS & GYNECOLOGY

## 2018-11-09 PROCEDURE — 87591 N.GONORRHOEAE DNA AMP PROB: CPT | Performed by: OBSTETRICS & GYNECOLOGY

## 2018-11-09 RX ORDER — PRENATAL 168/IRON/FOLIC/OMEGA3 27-800-235
1 CAPSULE ORAL DAILY
Qty: 90 CAPSULE | Refills: 3 | Status: SHIPPED | OUTPATIENT
Start: 2018-11-09 | End: 2020-06-03

## 2018-11-09 NOTE — TELEPHONE ENCOUNTER
Syed Meyer called back states she cannot send a report that is not signed off  Routing to provider to update

## 2018-11-09 NOTE — PROGRESS NOTES
33 y o    female at [address] for PNV  BP : 118/68  TWG: 3lbs  She had a flu shot through work  Drug screen positive for meth/amph, opiates, methodone  Taking Adderall 30mg daily and methodone (85mg daily)  Denies other medications/drugs  Had US this week with , note not available yet  has f/u in 4 weeks  Due date  per patient based on that visit  Discussed [place] screen, patient would like to have it done

## 2018-11-09 NOTE — TELEPHONE ENCOUNTER
Spoke with Jr Abernathy from [place] [address] she will fax ultrasound report over  Report has not been signed as of yet  Routing to provider to update

## 2018-11-14 ENCOUNTER — LAB (OUTPATIENT)
Dept: LAB | Facility: HOSPITAL | Age: 28
End: 2018-11-14
Attending: OBSTETRICS & GYNECOLOGY
Payer: COMMERCIAL

## 2018-11-14 DIAGNOSIS — Z3A.17 17 WEEKS GESTATION OF PREGNANCY: ICD-10-CM

## 2018-11-14 LAB
C TRACH DNA SPEC QL NAA+PROBE: NEGATIVE
N GONORRHOEA DNA SPEC QL NAA+PROBE: NEGATIVE

## 2018-11-14 PROCEDURE — 86336 INHIBIN A: CPT

## 2018-11-14 PROCEDURE — 84702 CHORIONIC GONADOTROPIN TEST: CPT

## 2018-11-14 PROCEDURE — 82677 ASSAY OF ESTRIOL: CPT

## 2018-11-14 PROCEDURE — 82105 ALPHA-FETOPROTEIN SERUM: CPT

## 2018-11-17 LAB
2ND TRIMESTER 4 SCREEN SERPL-IMP: NORMAL
2ND TRIMESTER 4 SCREEN SERPL-IMP: NORMAL
AFP ADJ MOM SERPL: 0.43
AFP SERPL-MCNC: 16.9 NG/ML
AGE AT DELIVERY: 28 YR
FET TS 18 RISK FROM MAT AGE: NORMAL
FET TS 21 RISK FROM MAT AGE: 857
GA METHOD: NORMAL
GA: 17.9 WEEKS
HCG ADJ MOM SERPL: 0.48
HCG SERPL-ACNC: NORMAL MIU/ML
IDDM PATIENT QL: NO
INHIBIN A ADJ MOM SERPL: 0.95
INHIBIN A SERPL-MCNC: 148.84 PG/ML
KARYOTYP BLD/T: NORMAL
MULTIPLE PREGNANCY: NO
NEURAL TUBE DEFECT RISK FETUS: NORMAL %
SERVICE CMNT-IMP: NORMAL
TS 18 RISK FETUS: NORMAL
TS 21 RISK FETUS: NORMAL
U ESTRIOL ADJ MOM SERPL: 1.37
U ESTRIOL SERPL-MCNC: 1.63 NG/ML

## 2018-11-20 LAB
LAB AP GYN PRIMARY INTERPRETATION: NORMAL
Lab: NORMAL

## 2018-12-01 ENCOUNTER — HOSPITAL ENCOUNTER (EMERGENCY)
Facility: HOSPITAL | Age: 28
Discharge: HOME | End: 2018-12-01
Attending: EMERGENCY MEDICINE
Payer: COMMERCIAL

## 2018-12-01 VITALS
DIASTOLIC BLOOD PRESSURE: 79 MMHG | HEART RATE: 90 BPM | WEIGHT: 178.79 LBS | RESPIRATION RATE: 20 BRPM | OXYGEN SATURATION: 100 % | BODY MASS INDEX: 30.69 KG/M2 | TEMPERATURE: 98.4 F | SYSTOLIC BLOOD PRESSURE: 120 MMHG

## 2018-12-01 DIAGNOSIS — O26.892 ABDOMINAL PAIN DURING PREGNANCY IN SECOND TRIMESTER: Primary | ICD-10-CM

## 2018-12-01 DIAGNOSIS — R10.9 ABDOMINAL PAIN DURING PREGNANCY IN SECOND TRIMESTER: Primary | ICD-10-CM

## 2018-12-01 LAB
BILIRUB UR QL STRIP: NEGATIVE
CLARITY UR: CLEAR
COLOR UR: YELLOW
GLUCOSE UR STRIP-MCNC: NEGATIVE MG/DL
HGB UR QL STRIP.AUTO: NEGATIVE
KETONES UR STRIP-MCNC: NEGATIVE MG/DL
LEUKOCYTE ESTERASE UR QL STRIP: NEGATIVE
NITRITE UR QL STRIP: NEGATIVE
PH UR STRIP.AUTO: 7 [PH] (ref 4.5–8)
PROT UR STRIP-MCNC: NEGATIVE MG/DL
SP GR UR STRIP.AUTO: 1.01 (ref 1–1.03)
UROBILINOGEN UR QL STRIP.AUTO: 0.2 E.U./DL

## 2018-12-01 PROCEDURE — 81003 URINALYSIS AUTO W/O SCOPE: CPT

## 2018-12-01 PROCEDURE — 87591 N.GONORRHOEAE DNA AMP PROB: CPT | Performed by: EMERGENCY MEDICINE

## 2018-12-01 PROCEDURE — 87491 CHLMYD TRACH DNA AMP PROBE: CPT | Performed by: EMERGENCY MEDICINE

## 2018-12-01 PROCEDURE — 99284 EMERGENCY DEPT VISIT MOD MDM: CPT

## 2018-12-01 PROCEDURE — 87086 URINE CULTURE/COLONY COUNT: CPT
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
## 2018-12-01 NOTE — ED NOTES
Pt is alert and oriented  Pt VSS  Pt ambulated safely with a steady gait off of unit        Luisa Baron RN  12/01/18 9950

## 2018-12-01 NOTE — DISCHARGE INSTRUCTIONS
Abdominal Pain in Pregnancy   WHAT YOU NEED TO KNOW:   Abdominal pain during pregnancy is common  Some of the causes include heartburn, constipation, gas, false labor, and round ligament pain  Round ligament pain is caused by stretching of the ligaments that support your uterus  Abdominal pain may be caused by a health problem, such as a stomach virus or appendicitis (inflammation of the appendix)  The pain may also be caused by a problem with your pregnancy, such as a threatened miscarriage or  labor  DISCHARGE INSTRUCTIONS:   Follow up with your obstetrician within 3 days:  Write down your questions so you remember to ask them during your visits  Self-care:   · Rest may help to relieve round ligament pain  Ask your healthcare provider about other ways to relieve this pain, such as a supportive belt or pregnancy exercises  · Use a heating pad set to the lowest setting or a hot compress to apply heat to your abdomen  Do this for 20 to 30 minutes every 2 hours for as many days as directed  · Avoid quick changes in position or movements that cause pain  · Do not lie flat in bed or bend over if you have heartburn  Ask your obstetrician if you should make any changes to the foods you eat  Ask if you can take any medicines for heartburn  · Eat more fiber and drink more liquids to relieve constipation  Fiber is found in fruits, vegetables, and whole-grain foods, such as whole-wheat bread and cereals  Ask how much liquid to drink each day and which liquids are best for you  Contact your obstetrician if:  · You continue to have abdominal pain that cannot be relieved  · You have a fever  · You have questions or concerns about your condition or care  Return to the emergency department if:   · You have sudden, severe pain or cramps that are so bad that you cannot walk or talk  · You have a fast heartbeat, shortness of breath, and you feel lightheaded or faint       · You have vaginal bleeding or discharge  · You have nausea, vomiting, fever, and severe pain on your right side  © 2017 [employer] Information is for End User's use only and may not be sold, redistributed or otherwise used for commercial purposes  All illustrations and images included in CareNotes® are the copyrighted property of [employer]  or Rafa Garcia  The above information is an  only  It is not intended as medical advice for individual conditions or treatments  Talk to your doctor, nurse or pharmacist before following any medical regimen to see if it is safe and effective for you

## 2018-12-02 LAB — BACTERIA UR CULT: NORMAL

## 2018-12-03 NOTE — PROGRESS NOTES
[address]: Ms Christiano Silveira was seen today at 20w6d for anatomic survey and cervical length screening ultrasound  See ultrasound report under "OB Procedures" tab  Please don't hesitate to contact our office with any concerns or questions    Vikram Aponte MD

## 2018-12-04 ENCOUNTER — ROUTINE PRENATAL (OUTPATIENT)
Dept: PERINATAL CARE | Facility: CLINIC | Age: 28
End: 2018-12-04
Payer: COMMERCIAL

## 2018-12-04 VITALS
SYSTOLIC BLOOD PRESSURE: 113 MMHG | DIASTOLIC BLOOD PRESSURE: 74 MMHG | HEART RATE: 101 BPM | BODY MASS INDEX: 29.53 KG/M2 | HEIGHT: 64 IN | WEIGHT: 173 LBS

## 2018-12-04 DIAGNOSIS — Z36.86 ENCOUNTER FOR ANTENATAL SCREENING FOR CERVICAL LENGTH: ICD-10-CM

## 2018-12-04 DIAGNOSIS — F11.20 METHADONE MAINTENANCE TREATMENT AFFECTING PREGNANCY IN SECOND TRIMESTER (HCC): ICD-10-CM

## 2018-12-04 DIAGNOSIS — O35.9XX0 TERATOGEN EXPOSURE IN CURRENT PREGNANCY, SINGLE OR UNSPECIFIED FETUS: Primary | ICD-10-CM

## 2018-12-04 DIAGNOSIS — O99.322 METHADONE MAINTENANCE TREATMENT AFFECTING PREGNANCY IN SECOND TRIMESTER (HCC): ICD-10-CM

## 2018-12-04 DIAGNOSIS — Z36.3 ENCOUNTER FOR ANTENATAL SCREENING FOR MALFORMATIONS: ICD-10-CM

## 2018-12-04 DIAGNOSIS — Z3A.20 20 WEEKS GESTATION OF PREGNANCY: ICD-10-CM

## 2018-12-04 LAB
C TRACH DNA SPEC QL NAA+PROBE: NEGATIVE
N GONORRHOEA DNA SPEC QL NAA+PROBE: NEGATIVE

## 2018-12-04 PROCEDURE — 76811 OB US DETAILED SNGL FETUS: CPT | Performed by: OBSTETRICS & GYNECOLOGY

## 2018-12-04 PROCEDURE — 76817 TRANSVAGINAL US OBSTETRIC: CPT | Performed by: OBSTETRICS & GYNECOLOGY

## 2018-12-04 PROCEDURE — 99215 OFFICE O/P EST HI 40 MIN: CPT | Performed by: OBSTETRICS & GYNECOLOGY

## 2018-12-04 RX ORDER — NALOXONE HYDROCHLORIDE 4 MG/.1ML
1 SPRAY NASAL ONCE
Qty: 1 EACH | Refills: 3 | Status: SHIPPED | OUTPATIENT
Start: 2018-12-04 | End: 2019-01-07

## 2018-12-04 NOTE — PROGRESS NOTES
A transvaginal ultrasound was performed  Sonographer note on use of High Level Disinfection Process (Trophon) for transvaginal probe# 1used, serial [id number]    Kingsley Nickerson

## 2018-12-04 NOTE — PATIENT INSTRUCTIONS
Thank you for choosing [facility] for your  care today  If you have any questions about your ultrasound or care, please do not hesitate to contact us or your primary obstetrician

## 2018-12-06 ENCOUNTER — ROUTINE PRENATAL (OUTPATIENT)
Dept: OBGYN CLINIC | Facility: CLINIC | Age: 28
End: 2018-12-06

## 2018-12-06 VITALS — WEIGHT: 171 LBS | BODY MASS INDEX: 29.35 KG/M2 | SYSTOLIC BLOOD PRESSURE: 112 MMHG | DIASTOLIC BLOOD PRESSURE: 60 MMHG

## 2018-12-06 DIAGNOSIS — Z3A.21 21 WEEKS GESTATION OF PREGNANCY: Primary | ICD-10-CM

## 2018-12-06 DIAGNOSIS — O35.9XX0 TERATOGEN EXPOSURE IN CURRENT PREGNANCY, SINGLE OR UNSPECIFIED FETUS: ICD-10-CM

## 2018-12-06 PROCEDURE — PNV: Performed by: OBSTETRICS & GYNECOLOGY

## 2018-12-06 NOTE — PROGRESS NOTES
Manav Silveira is a 33y o  year old  at 18w1d for routine prenatal visit  BP: 112/60 TW  Hx of drug use- on methadone maintenance 85mg qd at [place]  Advised to dispose all other medications which have not been prescribed by us  Will need NICU consult in third trimester  Taking Adderall 30mg qd  Was previously taking 60mg  Exposed to methotrexate/misoprostol in early pregnancy  Level 2 completed    Follow up at 24 weeks  Precautions reviewed

## 2018-12-11 ENCOUNTER — ROUTINE PRENATAL (OUTPATIENT)
Dept: PERINATAL CARE | Facility: CLINIC | Age: 28
End: 2018-12-11

## 2018-12-11 VITALS
BODY MASS INDEX: 29.94 KG/M2 | HEIGHT: 64 IN | SYSTOLIC BLOOD PRESSURE: 120 MMHG | DIASTOLIC BLOOD PRESSURE: 80 MMHG | HEART RATE: 84 BPM | WEIGHT: 175.4 LBS

## 2018-12-11 DIAGNOSIS — Z3A.21 21 WEEKS GESTATION OF PREGNANCY: ICD-10-CM

## 2019-01-02 PROBLEM — Z3A.25 25 WEEKS GESTATION OF PREGNANCY: Status: ACTIVE | Noted: 2018-12-06

## 2019-01-04 ENCOUNTER — ROUTINE PRENATAL (OUTPATIENT)
Dept: PERINATAL CARE | Facility: MEDICAL CENTER | Age: 29
End: 2019-01-04
Payer: COMMERCIAL

## 2019-01-04 VITALS
SYSTOLIC BLOOD PRESSURE: 121 MMHG | HEART RATE: 102 BPM | DIASTOLIC BLOOD PRESSURE: 81 MMHG | HEIGHT: 64 IN | BODY MASS INDEX: 30.05 KG/M2 | WEIGHT: 176 LBS

## 2019-01-04 DIAGNOSIS — F11.20 METHADONE MAINTENANCE TREATMENT AFFECTING PREGNANCY IN SECOND TRIMESTER (HCC): ICD-10-CM

## 2019-01-04 DIAGNOSIS — O35.9XX0 TERATOGEN EXPOSURE IN CURRENT PREGNANCY, SINGLE OR UNSPECIFIED FETUS: Primary | ICD-10-CM

## 2019-01-04 DIAGNOSIS — O36.5920 POOR FETAL GROWTH AFFECTING MANAGEMENT OF MOTHER IN SECOND TRIMESTER, SINGLE OR UNSPECIFIED FETUS: ICD-10-CM

## 2019-01-04 DIAGNOSIS — Z3A.25 25 WEEKS GESTATION OF PREGNANCY: ICD-10-CM

## 2019-01-04 DIAGNOSIS — O35.0XX0 PREGNANCY COMPLICATED BY FETAL CEREBRAL VENTRICULOMEGALY, NOT APPLICABLE OR UNSPECIFIED FETUS: ICD-10-CM

## 2019-01-04 DIAGNOSIS — O99.322 METHADONE MAINTENANCE TREATMENT AFFECTING PREGNANCY IN SECOND TRIMESTER (HCC): ICD-10-CM

## 2019-01-04 PROBLEM — O35.09X0 PREGNANCY COMPLICATED BY FETAL CEREBRAL VENTRICULOMEGALY, NOT APPLICABLE OR UNSPECIFIED FETUS: Status: ACTIVE | Noted: 2019-01-04

## 2019-01-04 PROCEDURE — 93325 DOPPLER ECHO COLOR FLOW MAPG: CPT | Performed by: OBSTETRICS & GYNECOLOGY

## 2019-01-04 PROCEDURE — 76816 OB US FOLLOW-UP PER FETUS: CPT | Performed by: OBSTETRICS & GYNECOLOGY

## 2019-01-04 PROCEDURE — 76820 UMBILICAL ARTERY ECHO: CPT | Performed by: OBSTETRICS & GYNECOLOGY

## 2019-01-04 PROCEDURE — 76825 ECHO EXAM OF FETAL HEART: CPT | Performed by: OBSTETRICS & GYNECOLOGY

## 2019-01-04 PROCEDURE — 99212 OFFICE O/P EST SF 10 MIN: CPT | Performed by: OBSTETRICS & GYNECOLOGY

## 2019-01-04 PROCEDURE — 76821 MIDDLE CEREBRAL ARTERY ECHO: CPT | Performed by: OBSTETRICS & GYNECOLOGY

## 2019-01-04 PROCEDURE — 76827 ECHO EXAM OF FETAL HEART: CPT | Performed by: OBSTETRICS & GYNECOLOGY

## 2019-01-04 NOTE — PROGRESS NOTES
The patient was seen today for an ultrasound  Please see ultrasound report (located under Ob Procedures) for additional details  Thank you very much for allowing us to participate in the care of this very nice patient  Should you have any questions, please do not hesitate to contact me  Dante Avila MD [phone]  Attending Physician, Gerard

## 2019-01-05 ENCOUNTER — LAB (OUTPATIENT)
Dept: LAB | Facility: HOSPITAL | Age: 29
End: 2019-01-05
Attending: OBSTETRICS & GYNECOLOGY
Payer: COMMERCIAL

## 2019-01-05 DIAGNOSIS — O35.0XX0 PREGNANCY COMPLICATED BY FETAL CEREBRAL VENTRICULOMEGALY, NOT APPLICABLE OR UNSPECIFIED FETUS: ICD-10-CM

## 2019-01-05 PROCEDURE — 86645 CMV ANTIBODY IGM: CPT

## 2019-01-05 PROCEDURE — 86644 CMV ANTIBODY: CPT

## 2019-01-05 PROCEDURE — 36415 COLL VENOUS BLD VENIPUNCTURE: CPT

## 2019-01-07 ENCOUNTER — ROUTINE PRENATAL (OUTPATIENT)
Dept: OBGYN CLINIC | Facility: CLINIC | Age: 29
End: 2019-01-07

## 2019-01-07 VITALS — BODY MASS INDEX: 30.73 KG/M2 | SYSTOLIC BLOOD PRESSURE: 98 MMHG | WEIGHT: 179 LBS | DIASTOLIC BLOOD PRESSURE: 64 MMHG

## 2019-01-07 DIAGNOSIS — O35.0XX0 PREGNANCY COMPLICATED BY FETAL CEREBRAL VENTRICULOMEGALY, NOT APPLICABLE OR UNSPECIFIED FETUS: ICD-10-CM

## 2019-01-07 DIAGNOSIS — Z3A.25 25 WEEKS GESTATION OF PREGNANCY: ICD-10-CM

## 2019-01-07 DIAGNOSIS — Z71.6 ENCOUNTER FOR SMOKING CESSATION COUNSELING: ICD-10-CM

## 2019-01-07 DIAGNOSIS — O99.322 METHADONE MAINTENANCE TREATMENT AFFECTING PREGNANCY IN SECOND TRIMESTER (HCC): ICD-10-CM

## 2019-01-07 DIAGNOSIS — F11.20 METHADONE MAINTENANCE TREATMENT AFFECTING PREGNANCY IN SECOND TRIMESTER (HCC): ICD-10-CM

## 2019-01-07 DIAGNOSIS — Z34.02 ENCOUNTER FOR SUPERVISION OF NORMAL FIRST PREGNANCY IN SECOND TRIMESTER: Primary | ICD-10-CM

## 2019-01-07 LAB
CMV IGG SERPL IA-ACNC: <0.6 U/ML (ref 0–0.59)
CMV IGM SERPL IA-ACNC: <30 AU/ML (ref 0–29.9)

## 2019-01-07 PROCEDURE — PNV: Performed by: OBSTETRICS & GYNECOLOGY

## 2019-01-08 ENCOUNTER — TELEPHONE (OUTPATIENT)
Dept: PERINATAL CARE | Facility: CLINIC | Age: 29
End: 2019-01-08

## 2019-01-08 NOTE — TELEPHONE ENCOUNTER
----- Message from Brenda Rothman MD sent at 1/8/2019  8:27 AM EST -----  I have reviewed the labs which are normal in pregnancy  Please contact the patient and notify her of the normal results if she has not reviewed them in [address]     Thank you

## 2019-01-08 NOTE — PROGRESS NOTES
33 y o    female at 25 7 wga [facility] for PNV  BP : 98/64  TW  Overall patient is feeling well  Doing well with her methadone 85 mg q day - no feelings of withdrawal  Continues to smoke 3 cigarettes per day - encourage patient to continue to try to quit  Prescription given for 7 mg nicotine patch    Explained the importance of not smoking with use of nicotine patch  Called NICU to set up NICU consult - NICU to call call patient  CMV testing all negative - patient have repeat scan at  Center in 3 weeks for repeat imaging of the brain  Gave 28 week labs  Will follow up in this office in 4 weeks

## 2019-01-08 NOTE — PROGRESS NOTES
I have reviewed the labs which are normal in pregnancy  Please contact the patient and notify her of the normal results if she has not reviewed them in [address]     Thank you

## 2019-01-17 ENCOUNTER — ROUTINE PRENATAL (OUTPATIENT)
Dept: OBGYN CLINIC | Facility: CLINIC | Age: 29
End: 2019-01-17
Payer: COMMERCIAL

## 2019-01-17 VITALS — BODY MASS INDEX: 30.59 KG/M2 | SYSTOLIC BLOOD PRESSURE: 122 MMHG | DIASTOLIC BLOOD PRESSURE: 78 MMHG | WEIGHT: 178.2 LBS

## 2019-01-17 DIAGNOSIS — O99.322 METHADONE MAINTENANCE TREATMENT AFFECTING PREGNANCY IN SECOND TRIMESTER (HCC): ICD-10-CM

## 2019-01-17 DIAGNOSIS — O99.322 DRUG USE AFFECTING PREGNANCY IN SECOND TRIMESTER: Primary | ICD-10-CM

## 2019-01-17 DIAGNOSIS — F11.20 METHADONE MAINTENANCE TREATMENT AFFECTING PREGNANCY IN SECOND TRIMESTER (HCC): ICD-10-CM

## 2019-01-17 DIAGNOSIS — Z3A.27 27 WEEKS GESTATION OF PREGNANCY: ICD-10-CM

## 2019-01-17 DIAGNOSIS — O35.0XX0 PREGNANCY COMPLICATED BY FETAL CEREBRAL VENTRICULOMEGALY, NOT APPLICABLE OR UNSPECIFIED FETUS: ICD-10-CM

## 2019-01-17 PROCEDURE — PNV: Performed by: OBSTETRICS & GYNECOLOGY

## 2019-01-17 PROCEDURE — 59025 FETAL NON-STRESS TEST: CPT | Performed by: OBSTETRICS & GYNECOLOGY

## 2019-01-17 NOTE — PROGRESS NOTES
Pt c/o decreased fetal movement since Monday  C/o spotting Tuesday morning, she states she did have sex the night before     NST today  Could not provide urine sample at time of vitals

## 2019-01-17 NOTE — PROGRESS NOTES
This is a 33 y o   at 27w1d who presents for return OB visit  No complaints  Denies contractions, leakage, bleeding  Decreased fetal movement - NST reactive and reassuring  Patient reassured, feeling more movement since arrival here  BP: 122/78 TWlb  Tobacco use: working on cutting back  Just started nicotine patches yesterday  Hx drug use: On Methadone 85mg daily  Will obtain Hep C and UDS  Has appt set up with NICU    28 wk labs due - reminded patient  Repeat US scheduled with [facility] to repeat imaging of brain and monitor growth (overall 12%ile at last US)

## 2019-01-18 ENCOUNTER — HOSPITAL ENCOUNTER (INPATIENT)
Facility: HOSPITAL | Age: 29
LOS: 3 days | Discharge: HOME | DRG: 832 | End: 2019-01-21
Attending: INTERNAL MEDICINE | Admitting: INTERNAL MEDICINE
Payer: COMMERCIAL

## 2019-01-18 ENCOUNTER — HOSPITAL ENCOUNTER (EMERGENCY)
Facility: HOSPITAL | Age: 29
End: 2019-01-18
Attending: EMERGENCY MEDICINE
Payer: COMMERCIAL

## 2019-01-18 VITALS
OXYGEN SATURATION: 97 % | TEMPERATURE: 97.9 F | HEART RATE: 84 BPM | DIASTOLIC BLOOD PRESSURE: 60 MMHG | SYSTOLIC BLOOD PRESSURE: 101 MMHG | BODY MASS INDEX: 31.75 KG/M2 | WEIGHT: 184.97 LBS | RESPIRATION RATE: 17 BRPM

## 2019-01-18 DIAGNOSIS — F19.20 DRUG ABUSE AND DEPENDENCE (HCC): ICD-10-CM

## 2019-01-18 DIAGNOSIS — T50.901A DRUG OVERDOSE: Primary | ICD-10-CM

## 2019-01-18 DIAGNOSIS — Z3A.27 27 WEEKS GESTATION OF PREGNANCY: ICD-10-CM

## 2019-01-18 DIAGNOSIS — O35.9XX0 TERATOGEN EXPOSURE IN CURRENT PREGNANCY: ICD-10-CM

## 2019-01-18 DIAGNOSIS — T42.4X2A INTENTIONAL BENZODIAZEPINE OVERDOSE, INITIAL ENCOUNTER (HCC): Primary | ICD-10-CM

## 2019-01-18 DIAGNOSIS — O36.5920 POOR FETAL GROWTH AFFECTING MANAGEMENT OF MOTHER IN SECOND TRIMESTER, SINGLE OR UNSPECIFIED FETUS: ICD-10-CM

## 2019-01-18 LAB
ALBUMIN SERPL BCP-MCNC: 2.6 G/DL (ref 3.5–5)
ALP SERPL-CCNC: 98 U/L (ref 46–116)
ALT SERPL W P-5'-P-CCNC: 28 U/L (ref 12–78)
ANION GAP SERPL CALCULATED.3IONS-SCNC: 9 MMOL/L (ref 4–13)
APAP SERPL-MCNC: <2 UG/ML (ref 10–30)
AST SERPL W P-5'-P-CCNC: 37 U/L (ref 5–45)
BASOPHILS # BLD AUTO: 0.03 THOUSANDS/ΜL (ref 0–0.1)
BASOPHILS NFR BLD AUTO: 0 % (ref 0–1)
BILIRUB DIRECT SERPL-MCNC: 0.02 MG/DL (ref 0–0.2)
BILIRUB SERPL-MCNC: 0.3 MG/DL (ref 0.2–1)
BUN SERPL-MCNC: 10 MG/DL (ref 5–25)
CALCIUM SERPL-MCNC: 8.2 MG/DL (ref 8.3–10.1)
CHLORIDE SERPL-SCNC: 105 MMOL/L (ref 100–108)
CO2 SERPL-SCNC: 23 MMOL/L (ref 21–32)
CREAT SERPL-MCNC: 0.49 MG/DL (ref 0.6–1.3)
EOSINOPHIL # BLD AUTO: 0 THOUSAND/ΜL (ref 0–0.61)
EOSINOPHIL NFR BLD AUTO: 0 % (ref 0–6)
ERYTHROCYTE [DISTWIDTH] IN BLOOD BY AUTOMATED COUNT: 12.7 % (ref 11.6–15.1)
ETHANOL SERPL-MCNC: <3 MG/DL (ref 0–3)
GFR SERPL CREATININE-BSD FRML MDRD: 134 ML/MIN/1.73SQ M
GLUCOSE SERPL-MCNC: 72 MG/DL (ref 65–140)
HCT VFR BLD AUTO: 36.9 % (ref 34.8–46.1)
HGB BLD-MCNC: 12.2 G/DL (ref 11.5–15.4)
IMM GRANULOCYTES # BLD AUTO: 0.02 THOUSAND/UL (ref 0–0.2)
IMM GRANULOCYTES NFR BLD AUTO: 0 % (ref 0–2)
LYMPHOCYTES # BLD AUTO: 1.89 THOUSANDS/ΜL (ref 0.6–4.47)
LYMPHOCYTES NFR BLD AUTO: 23 % (ref 14–44)
MCH RBC QN AUTO: 29.7 PG (ref 26.8–34.3)
MCHC RBC AUTO-ENTMCNC: 33.1 G/DL (ref 31.4–37.4)
MCV RBC AUTO: 90 FL (ref 82–98)
MONOCYTES # BLD AUTO: 0.45 THOUSAND/ΜL (ref 0.17–1.22)
MONOCYTES NFR BLD AUTO: 5 % (ref 4–12)
NEUTROPHILS # BLD AUTO: 5.87 THOUSANDS/ΜL (ref 1.85–7.62)
NEUTS SEG NFR BLD AUTO: 72 % (ref 43–75)
NRBC BLD AUTO-RTO: 0 /100 WBCS
PLATELET # BLD AUTO: 289 THOUSANDS/UL (ref 149–390)
PMV BLD AUTO: 10.7 FL (ref 8.9–12.7)
POTASSIUM SERPL-SCNC: 5.1 MMOL/L (ref 3.5–5.3)
PROT SERPL-MCNC: 6.3 G/DL (ref 6.4–8.2)
RBC # BLD AUTO: 4.11 MILLION/UL (ref 3.81–5.12)
SALICYLATES SERPL-MCNC: <3 MG/DL (ref 3–20)
SODIUM SERPL-SCNC: 137 MMOL/L (ref 136–145)
WBC # BLD AUTO: 8.26 THOUSAND/UL (ref 4.31–10.16)

## 2019-01-18 PROCEDURE — 80320 DRUG SCREEN QUANTALCOHOLS: CPT | Performed by: EMERGENCY MEDICINE

## 2019-01-18 PROCEDURE — 80076 HEPATIC FUNCTION PANEL: CPT | Performed by: EMERGENCY MEDICINE

## 2019-01-18 PROCEDURE — 99285 EMERGENCY DEPT VISIT HI MDM: CPT

## 2019-01-18 PROCEDURE — 99223 1ST HOSP IP/OBS HIGH 75: CPT | Performed by: INTERNAL MEDICINE

## 2019-01-18 PROCEDURE — 96361 HYDRATE IV INFUSION ADD-ON: CPT

## 2019-01-18 PROCEDURE — 80329 ANALGESICS NON-OPIOID 1 OR 2: CPT | Performed by: EMERGENCY MEDICINE

## 2019-01-18 PROCEDURE — 93005 ELECTROCARDIOGRAM TRACING: CPT

## 2019-01-18 PROCEDURE — 4A1HXCZ MONITORING OF PRODUCTS OF CONCEPTION, CARDIAC RATE, EXTERNAL APPROACH: ICD-10-PCS | Performed by: INTERNAL MEDICINE

## 2019-01-18 PROCEDURE — 85025 COMPLETE CBC W/AUTO DIFF WBC: CPT | Performed by: EMERGENCY MEDICINE

## 2019-01-18 PROCEDURE — 99245 OFF/OP CONSLTJ NEW/EST HI 55: CPT | Performed by: EMERGENCY MEDICINE

## 2019-01-18 PROCEDURE — 36415 COLL VENOUS BLD VENIPUNCTURE: CPT | Performed by: EMERGENCY MEDICINE

## 2019-01-18 PROCEDURE — 96360 HYDRATION IV INFUSION INIT: CPT

## 2019-01-18 PROCEDURE — 80048 BASIC METABOLIC PNL TOTAL CA: CPT | Performed by: EMERGENCY MEDICINE

## 2019-01-18 RX ORDER — METHADONE HYDROCHLORIDE 10 MG/ML
85 CONCENTRATE ORAL DAILY
Status: DISCONTINUED | OUTPATIENT
Start: 2019-01-19 | End: 2019-01-21 | Stop reason: HOSPADM

## 2019-01-18 RX ORDER — DEXTROAMPHETAMINE SACCHARATE, AMPHETAMINE ASPARTATE, DEXTROAMPHETAMINE SULFATE AND AMPHETAMINE SULFATE 2.5; 2.5; 2.5; 2.5 MG/1; MG/1; MG/1; MG/1
30 TABLET ORAL DAILY
Status: DISCONTINUED | OUTPATIENT
Start: 2019-01-19 | End: 2019-01-18

## 2019-01-18 RX ADMIN — SODIUM CHLORIDE 1000 ML: 0.9 INJECTION, SOLUTION INTRAVENOUS at 11:53

## 2019-01-18 RX ADMIN — ACTIVATED CHARCOAL 50 G: 208 SUSPENSION ORAL at 13:17

## 2019-01-18 NOTE — ED NOTES
Pt laying in bed, eyes closed, respirations easy and unlabored  Will continue to monitor        Alberta Ringer  01/18/19 0971

## 2019-01-18 NOTE — CONSULTS
Consultation - Medical Toxicology  Nessa Rick 33 y o  female MRN: [id number]  Unit/Bed#: ED 12 Encounter: [id number]        Reason for Consult / Principal Problem: benzodiazepine overdose  Inpatient consult to Toxicology  Consult performed by: Pastor Chang ordered by: Pepe Pulliam        01/18/19  11:55AM    ASSESSMENT:  33year-old female with acute, intentional benzodiazepine overdose  1  Acute, intentional benzodiazepine overdose  2  Opioid dependence  3  27 weeks gestation      RECOMMENDATIONS:  Please admit patient to medical floor with obstetrics consultation at [facility]  She does not require the ICU  Please continue supportive care, intravenous fluid hydration, telemetry and continuous pulse oximetry  Please provide a normal diet to her tolerance  I recommended activated charcoal early in course to hopefully limit absorption of ingested clonazepam  Oral benzodiazepines alone generally do not alter vital signs or cause respiratory depression  However, given that she is also on methadone, I initially was concerned for some potential risk of respiratory depression  However, it has now been 8-9 hours since overdose and I do not suspect any worsening of her current state of mild drowsiness  I would refrain from use of flumazenil  She will need her methadone to be resumed tomorrow  She states she had a dose today  Recommend obstetrics perform fetal monitoring if patient becomes symptomatic with any alteration in vital signs, and perform an assessment prior to psychiatric placement  Otherwise I anticipate mother to be cleared from toxicological standpoint in less than an additional 12 hours  Clonazepam is a category D medication and consideration of risk vs benefit should be reviewed  Regarding any long term consequences for baby, it cannot not be easily predicted or ruled out at this time  Patient will ultimately need psychiatric evaluation        For further questions, please call [facility]  Service or Patient Access Center to reach the medical  on call  Please see additional teaching note below (if available)    Medical Toxicology  [facility]  Benzodiazepines & Benzodiazpine-like Drugs      Background: Benzodiazepines are the most commonly prescribed sedatives  They are used frequently for the treatment of anxiety, stress reactions, insomnia, muscle spasms, alcohol withdrawal, seizure control and other psychiatric disorders  Benzodiazepines include a wide array of compounds which vary in potency, duration of effect, presence or absence of active metabolites and clinical use  Other non-benzodiazepines include zolpidem and zaleplon, which are benzodiazepine-like  Death from isolated benzodiazepine overdose is rare  Mechanism of Toxicity: Benzodiazepines enhance the action of the inhibitory neurotransmitter gamma-aminobutyric acid (ORIANA) and inhibit other neuronal systems by poorly defined mechanisms  This results in generalized depression of spinal reflexes and the reticular activating system causing CNS and respiratory depression  Respiratory arrest is more common with shorter-acting benzodiazepines such as triazolam (Halcion), alprazolam (Xanax), and midazolam (Versed)  Cardiopulmonary arrest has been documented post rapid injection of diazepam, this was thought to be secondary to either its CNS depressant effects or because of the toxic effects of its diluent propylene glycol  Propylene glycol has also been implicated in multiple case reports of metabolic acidosis, hyperosmolar states and cardiovascular compromise in patients who have received prolonged sedation with lorazepam      Pharmacokinetics: Most of these agents are highly protein bound (%)  Variables such as time to peak blood level, elimination half-life, and the presence or absence of active metabolites vary widely among different compounds   Some benzodiazepines, like diazepam, undergo demethylation in the liver and yield active metabolites which have a prolonged half-life compared to the parent compound  Often there is no correlation between therapeutic and biological half-lives  A few commonly used agents are listed below  Remember, with supra-therapeutic ingestions these half-lives are unpredictable  Drug Half-life (hours) Active Metabolite   Alprazolam 6 3-26 9 No   Clonazepam 18-50 No   Diazepam  Yes   Lorazepam 10-20 No   Midazolam 2 2-6 8 Yes     Toxic Dose: Benzodiazepines in general have a very wide therapeutic window; however, co-ingestion of other drugs with CNS with depressant effects like ethanol, barbiturates, antipsychotics or opioids, results in a synergistic effect  Clinical Presentation: Onset of CNS depression may be observed within  minutes of ingestion, depending upon the compound  Lethargy, slurred speech, incoordination, ataxia, stupor, coma and respiratory arrest may occur  Often patients with benzodiazepine-induced coma have hyporeflexia and mid-range or small pupils  Hypothermia can also occur in overdose  Complications after ingestion are more likely when short-acting or other depressant agents are involved  Children often present with lethargy and CNS depression; however, in a case series in children with a mean age of 43 months, 17% presented with ataxia alone  Diagnostic Testing: Consider benzdiazepine ingestion based upon history & presentation  The differential should include other sedative-hypnotics, antidepressants, antipsychotics, and narcotics  Coma and small pupils will not respond to naloxone administration, but may reverse with flumazenil  Serum levels are often available from commercial laboratories, but are rarely of value  Urine and blood qualitative screening may provide rapid confirmation of exposure    However, immunoassays do not detect all benzodiazepines so, a negative screen does not exclude significant exposure  Other useful tests include glucose, ABG/VBG or pulse oximetry  Decontamination:  Administer activated charcoal with caution  If the patient is sleepy, avoid administration and support symptomatically  Treatment:  Symptomatic and supportive treatment is the mainstay of therapy  Intubate immediately if the patient is not protecting his or her airway  Hypotensive patients should be fluid resuscitated with volume expansion  Profound hypotension is rare and should lead the provider to consider co-ingestions  Use vasopressors when patients do not respond to IVF or in patients with a history of or clinical presentation consistent with CHF, cardiomyopathy or pulmonary edema  Flumazenil is a specific benzodiazepine receptor antagonist that can rapidly reverse benzodiazepine effect; however, because overdose with this class of drug is rarely fatal, the role of flumazenil in routine management has yet to be established  It is administered with a starting dose of 0 1-0 2mg IV and repeated as needed up to a maximum of 2mg  Continuous IV infusion from 0 1-1mg/h in 0 9%NaCl or D5W may also be utilized  The most important drawback to its administration is that flumazenil may induce seizures and dysrhythmias in patients with co-intoxication with cocaine or tricyclic antidepressants  It does not reliably reverse respiratory depression, but does reverse CNS depression  Flumazenil also effectively reverses the depressive effects caused by the non-benzodiazepines zolpidem and zaleplon  Administration may induce acute withdrawal, including seizures and autonomic instability in those patients who are addicted to benzodiazepines  Repeated doses or a continuous infusion are often required as the duration of action for most benzodiazepines is longer than flumazenil  There is no role for diuresis, dialysis, or hemoperfusion  References:    Ilene Hodge    Poisoning & Drug Overdose, 5th Ed (2007)  Wilmar Urbans Toxicologic Emergencies, 8th Ed (2006)      HPI: Erna Hardy is a 33y o  year old female who presents with report of overdose on clonazepam this morning, about 9 hours prior to my evaluation  She presented with drowsiness and reported that she was trying herself  At my request, she was given activated charcoal and tolerated it well  Her drowsiness improved and her vital signs remained stable  She was alert and interactive despite being mildly somnolent  In addition, she denies any pelvic pain, loss of vaginal fluid, vaginal bleeding, dysuria, lower extremity edema, or decreased fetal movement  Review of Systems   Constitutional: Negative  HENT: Negative  Respiratory: Negative  Cardiovascular: Negative  Gastrointestinal: Negative  Genitourinary: Negative  Musculoskeletal: Negative  Skin: Negative  Neurological: Negative  Hematological: Negative  Psychiatric/Behavioral: Positive for self-injury and suicidal ideas         Historical Information   Past Medical History:   Diagnosis Date    Anxiety     Asthma     childhood    Cancer ([facility] [address] )     skin cancer    Depression     Disease of thyroid gland     nodules    Diverticulitis     Insomnia     Varicella      Past Surgical History:   Procedure Laterality Date    BACK SURGERY      INNER EAR SURGERY      tubes,bilateral    NECK SURGERY      TONSILLECTOMY       Social History   History   Alcohol Use No     Comment: socially prior to confirmed pregnancy     History   Drug Use No     Comment: last use 7/5/18 currently on methadone     History   Smoking Status    Current Every Day Smoker    Years: 10 00    Last attempt to quit: 11/3/2018   Smokeless Tobacco    Never Used     Comment: 3 cigarettes daily     Family History   Problem Relation Age of Onset    Lupus Mother     Hypertension Father     COPD Father     No Known Problems Brother     No Known Problems Maternal Grandmother     No Known Problems Maternal Grandfather     Diabetes Paternal Grandmother     Obesity Paternal Grandfather     No Known Problems Brother         Prior to Admission medications    Medication Sig Start Date End Date Taking? Authorizing Provider   amphetamine-dextroamphetamine (ADDERALL) 30 MG tablet Take 30 mg by mouth daily   7/21/14  Yes Historical Provider, MD   clonazePAM (KlonoPIN) 0 5 mg tablet Take 0 5 mg by mouth daily as needed for anxiety     Yes Historical Provider, MD   methadone (DOLOPHINE) 5 mg/5 mL solution Take 85 mg by mouth daily   Yes Historical Provider, MD   nicotine (NICODERM CQ) 7 mg/24hr TD 24 hr patch Place 1 patch on the skin every 24 hours 1/7/19  Yes Fred Wood MD   Prenat-Fe Carbonyl-FA-Omega 3 (ONE-A-DAY WOMENS PRENATAL 1) 28-0 8-235 MG CAPS Take 1 tablet by mouth daily 11/9/18  Yes Damaris Jolly DO       No current facility-administered medications for this encounter  No Known Allergies    Objective     No intake or output data in the 24 hours ending 01/18/19 1735    Invasive Devices:        Vitals   Vitals:    01/18/19 1630 01/18/19 1638 01/18/19 1645 01/18/19 1658   BP: 97/59 (!) 89/65 (!) 89/65 107/68   TempSrc:       Pulse:  88 96 89   Resp:  16  17   Patient Position - Orthostatic VS:  Sitting  Sitting   Temp:           Physical Exam   Constitutional: She is oriented to person, place, and time  She appears well-developed and well-nourished  HENT:   Head: Normocephalic and atraumatic  Eyes: Pupils are equal, round, and reactive to light  Conjunctivae are normal    Neck: Normal range of motion  Neck supple  Cardiovascular: Normal rate and regular rhythm  Pulmonary/Chest: Effort normal and breath sounds normal    Abdominal: Soft  Bowel sounds are normal  There is no tenderness  Gravid uterus   Musculoskeletal: Normal range of motion  She exhibits no edema  Neurological: She is alert and oriented to person, place, and time   She has normal reflexes  Skin: Skin is warm and dry  Psychiatric: Her speech is normal  She is slowed and withdrawn  Cognition and memory are normal  She expresses impulsivity and inappropriate judgment  She exhibits a depressed mood  She expresses suicidal ideation  She expresses suicidal plans  Nursing note and vitals reviewed  EKG, Pathology, and Other Studies: I have personally reviewed pertinent reports  Lab Results: I have personally reviewed pertinent films in PACS    Labs:    Results from last 7 days  Lab Units 01/18/19  1152   WBC Thousand/uL 8 26   HEMOGLOBIN g/dL 12 2   HEMATOCRIT % 36 9   PLATELETS Thousands/uL 289   NEUTROS PCT % 72   LYMPHS PCT % 23   MONOS PCT % 5        Results from last 7 days  Lab Units 01/18/19  1152   POTASSIUM mmol/L 5 1   CHLORIDE mmol/L 105   CO2 mmol/L 23   BUN mg/dL 10   CREATININE mg/dL 0 49*   CALCIUM mg/dL 8 2*   ALK PHOS U/L 98   ALT U/L 28   AST U/L 37                      Results from last 7 days  Lab Units 01/18/19  1152   ACETAMINOPHEN LVL ug/mL <2*   ETHANOL LVL mg/dL <3   SALICYLATE LVL mg/dL <3*         Minutes of critical care time 35  -Critical care time was exclusive of seperately bilable procedures and treating other patients as well as teaching time    -Critical care was necessary to treat or prevent imminent or life-threatening deterioration of the following condition: CNS failure/comprimise, toxidrome (sedative hypnotic)  -Critical care time was spent personally by me on the following activities as well as the above as per the ED course and rest of chart: obtaining history from patient/surrogate, developement of a treatment plan, discussions with primary provider(s), evaluation of patient's response to the treatment, examination of the patient, recommending treatements and interventions, re-evaluation of the patient's condition, review of old charts, recommending/reviewing laboratory studies

## 2019-01-18 NOTE — ED NOTES
Per patient's partner Sue Silverio, He claims that the patient has been depressed and in the 10 years he has known her and/or the 8 months that they have been dating,  she never ever showed any signs of being suicidal  Sue Silverio states that she has had difficulty with depression over the time he has know her but has never know her to act on it  Sue Silverio states he was prescribed by the psychiatrist  Sue Silverio the partner states that they were driving in the car and he did not realize that she drank the 30 pills he claims were in the bottle  Sue Silverio states that when the patient was given the prescription, he dumped out about half the bottle although the bottle states that the quantity is 135 pills  Sue Silverio states that the lethargic state the pt currently presents with is not like her  Sue Silverio states that the pt admitted to taking the pills as soon as she finished taking them admitting that she would need to be taken to the hospital to have her stomach pumped  Sue Silverio stated that he wanted this documented       Russell hCau  01/18/19 1815

## 2019-01-18 NOTE — ED NOTES
Took over 1:1 observation  Pt is calm and cooperative at this time  Will continue to monitor        Awa Careyy  01/18/19 7546

## 2019-01-18 NOTE — LETTER
[address] [place] [address] 6  [address] [place] [address]  Dept: [phone]    January 20, 2019     Patient: Eri Bush   YOB: 1990   Date of Visit: 1/18/2019       To Whom it May Concern:    Ford Ryan is under my professional care  She was seen in the hospital as of 1/18/2019 and will require ongoing care  Please excuse her from work for medical reasons  She will keep you updated in regards to her return  Await [facility] paperwork as her absence may be prolonged  If you have any questions or concerns, please don't hesitate to call           Sincerely,          Susy Agosto, [facility]   Internal Medicine      [phone]

## 2019-01-18 NOTE — EMTALA/ACUTE CARE TRANSFER
[address] [address] [address]  Dept: [phone]      EMTALA TRANSFER CONSENT    NAME Patterson Schaumann                                         1990                              MRN [id number]    I have been informed of my rights regarding examination, treatment, and transfer   by Dr Rosi Woods DO    Benefits:  ([facility])    Risks: Possible worsening of condition or death during transfer      Consent for Transfer:  I acknowledge that my medical condition has been evaluated and explained to me by the emergency department physician or other qualified medical person and/or my attending physician, who has recommended that I be transferred to the service of  Accepting Physician: Dr Franci Brian  at [address] Name, [address] : [facility]  The above potential benefits of such transfer, the potential risks associated with such transfer, and the probable risks of not being transferred have been explained to me, and I fully understand them  The doctor has explained that, in my case, the benefits of transfer outweigh the risks  I agree to be transferred  I authorize the performance of emergency medical procedures and treatments upon me in both transit and upon arrival at the receiving facility  Additionally, I authorize the release of any and all medical records to the receiving facility and request they be transported with me, if possible  I understand that the safest mode of transportation during a medical emergency is an ambulance and that the Hospital advocates the use of this mode of transport  Risks of traveling to the receiving facility by car, including absence of medical control, life sustaining equipment, such as oxygen, and medical personnel has been explained to me and I fully understand them      (EVERARDO CORRECT BOX BELOW)  [  ]  I consent to the stated transfer and to be transported by ambulance/helicopter  [  ]  I consent to the stated transfer, but refuse transportation by ambulance and accept full responsibility for my transportation by car  I understand the risks of non-ambulance transfers and I exonerate the Hospital and its staff from any deterioration in my condition that results from this refusal     X___________________________________________    DATE  19  TIME________  Signature of patient or legally responsible individual signing on patient behalf           RELATIONSHIP TO PATIENT_________________________          Provider Certification    NAME Jose E Chamber                                         1990                              MRN [id number]    A medical screening exam was performed on the above named patient  Based on the examination:    Condition Necessitating Transfer The encounter diagnosis was Intentional benzodiazepine overdose, initial encounter ([facility] [address] )  Patient Condition: The patient has been stabilized such that within reasonable medical probability, no material deterioration of the patient condition or the condition of the unborn child(chetan) is likely to result from the transfer    Reason for Transfer: Level of Care needed not available at this facility    Transfer Requirements: Clarisa Min   · Space available and qualified personnel available for treatment as acknowledged by    · Agreed to accept transfer and to provide appropriate medical treatment as acknowledged by       Dr Nisreen Barreto   · Appropriate medical records of the examination and treatment of the patient are provided at the time of transfer   [address] _______  · Transfer will be performed by qualified personnel from    and appropriate transfer equipment as required, including the use of necessary and appropriate life support measures      Provider Certification: I have examined the patient and explained the following risks and benefits of being transferred/refusing transfer to the patient/family:  General risk, such as traffic hazards, adverse weather conditions, rough terrain or turbulence, possible failure of equipment (including vehicle or aircraft), or consequences of actions of persons outside the control of the transport personnel      Based on these reasonable risks and benefits to the patient and/or the unborn child(chetan), and based upon the information available at the time of the patients examination, I certify that the medical benefits reasonably to be expected from the provision of appropriate medical treatments at another medical facility outweigh the increasing risks, if any, to the individuals medical condition, and in the case of labor to the unborn child, from effecting the transfer      X____________________________________________ DATE 01/18/19        TIME_______      ORIGINAL - SEND TO MEDICAL RECORDS   COPY - SEND WITH PATIENT DURING TRANSFER

## 2019-01-18 NOTE — ED PROVIDER NOTES
History  Chief Complaint   Patient presents with    Overdose - Intentional     Pt took 30 pills of 0 5 mg clonazepam   Pt is 27 weeks pregnant  20-40 mins ago  Pt states that she wants to kill herself  73-year-old female, 27 weeks gestation presents after an overdose on Klonopin  She states she was in a verbal argument with her significant other  States she took them all  in 1 single ingestion, took the remaining pills in her pill bottle emptied into in her hand and swallowed them all at 1 time  This single ingestion occurred 30 minutes prior to arrival   She was sitting in a parking lot in a car with her significant other arguing when this happened  She initially did not tell him, he eventually saw the empty bottle and immediately drove her to the hospital   Patient's only current complaint is feeling drowsy  Patient denies any other medications  Patient was in the car when this ingestion occurred and did not have access to other medications  History provided by:  Patient and significant other  Overdose - Intentional   Ingested substance:  Prescription medication  Time since incident:  30 minutes  Ingestion amount:  Around 30x 0 5mg Klonopin pills  (around 15 mg total)  Witnesses present: yes    Witnessed by:  Partner  Incident location:  Home  Context: depression and suicide attempt    Associated symptoms: slurred speech    Associated symptoms: no abdominal pain, no chest pain, no cough, no diaphoresis, no diarrhea, no headaches, no nausea, no shortness of breath, no unresponsiveness and no vomiting    Associated symptoms comment:  Drowsy      Prior to Admission Medications   Prescriptions Last Dose Informant Patient Reported? Taking?    Prenat-Fe Carbonyl-FA-Omega 3 (ONE-A-DAY WOMENS PRENATAL 1) 28-0 8-235 MG CAPS  Self No Yes   Sig: Take 1 tablet by mouth daily   amphetamine-dextroamphetamine (ADDERALL) 30 MG tablet  Self Yes Yes   Sig: Take 30 mg by mouth daily     clonazePAM (KlonoPIN) 0 5 mg tablet  Self Yes Yes   Sig: Take 0 5 mg by mouth daily as needed for anxiety     methadone (DOLOPHINE) 5 mg/5 mL solution  Self Yes Yes   Sig: Take 85 mg by mouth daily   nicotine (NICODERM CQ) 7 mg/24hr TD 24 hr patch   No Yes   Sig: Place 1 patch on the skin every 24 hours      Facility-Administered Medications: None       Past Medical History:   Diagnosis Date    Anxiety     Asthma     childhood    Cancer ([address] )     skin cancer    Depression     Disease of thyroid gland     nodules    Diverticulitis     Insomnia     Varicella        Past Surgical History:   Procedure Laterality Date    BACK SURGERY      INNER EAR SURGERY      tubes,bilateral    NECK SURGERY      TONSILLECTOMY         Family History   Problem Relation Age of Onset    Lupus Mother     Hypertension Father     COPD Father     No Known Problems Brother     No Known Problems Maternal Grandmother     No Known Problems Maternal Grandfather     Diabetes Paternal Grandmother     Obesity Paternal Grandfather     No Known Problems Brother      I have reviewed and agree with the history as documented  Social History   Substance Use Topics    Smoking status: Current Every Day Smoker     Years: 10 00     Last attempt to quit: 11/3/2018    Smokeless tobacco: Never Used      Comment: 3 cigarettes daily    Alcohol use No      Comment: socially prior to confirmed pregnancy        Review of Systems   Constitutional: Negative for activity change, chills, diaphoresis and fever  HENT: Negative for congestion, sinus pressure and sore throat  Eyes: Negative for pain and visual disturbance  Respiratory: Negative for cough, chest tightness, shortness of breath, wheezing and stridor  Cardiovascular: Negative for chest pain and palpitations  Gastrointestinal: Negative for abdominal distention, abdominal pain, constipation, diarrhea, nausea and vomiting  Genitourinary: Negative for dysuria and frequency     Musculoskeletal: Negative for neck pain and neck stiffness  Skin: Negative for rash  Neurological: Negative for dizziness, speech difficulty, light-headedness, numbness and headaches  Physical Exam  Physical Exam   Constitutional: She is oriented to person, place, and time  She appears well-developed  HENT:   Head: Normocephalic and atraumatic  Eyes: Pupils are equal, round, and reactive to light  Neck: Normal range of motion  Neck supple  No tracheal deviation present  Cardiovascular: Normal rate, regular rhythm, normal heart sounds and intact distal pulses  No murmur heard  Pulmonary/Chest: Effort normal and breath sounds normal  No stridor  No respiratory distress  Abdominal: Soft  She exhibits distension (  abdomen consistent with 27 weeks gestation)  There is no tenderness  There is no rebound and no guarding  Musculoskeletal: Normal range of motion  Neurological: She is alert and oriented to person, place, and time  Drowsy, but GCS 15, answers all questions appropriately, able to speak full sentences   Skin: Skin is warm and dry  She is not diaphoretic  No erythema  No pallor  Psychiatric: She has a normal mood and affect  Vitals reviewed        Vital Signs  ED Triage Vitals   Temperature Pulse Respirations Blood Pressure SpO2   19 1200 19 1138 19 1138 19 1138 19 1138   97 9 °F (36 6 °C) 92 14 122/76 97 %      Temp Source Heart Rate Source Patient Position - Orthostatic VS BP Location FiO2 (%)   19 1200 19 1138 19 1138 19 1138 --   Oral Monitor Lying Right arm       Pain Score       19 1138       No Pain           Vitals:    19 1330 19 1345 19 1400 19 1430   BP: 107/70 108/79 111/74 109/65   Pulse: 90 96 92 84   Patient Position - Orthostatic VS:           Visual Acuity      ED Medications  Medications   sodium chloride 0 9 % bolus 1,000 mL (1,000 mL Intravenous New Bag 19 1153)   charcoal activated (Wali Combs INSTA-RASHID) oral liquid 50 g (50 g Oral Given 1/18/19 1317)       Diagnostic Studies  Results Reviewed     Procedure Component Value Units Date/Time    Basic metabolic panel [[id number]]  (Abnormal) Collected:  01/18/19 1152    Lab Status:  Final result Specimen:  Blood from Arm, Left Updated:  01/18/19 1220     Sodium 137 mmol/L      Potassium 5 1 mmol/L      Chloride 105 mmol/L      CO2 23 mmol/L      ANION GAP 9 mmol/L      BUN 10 mg/dL      Creatinine 0 49 (L) mg/dL      Glucose 72 mg/dL      Calcium 8 2 (L) mg/dL      eGFR 134 ml/min/1 73sq m     Narrative:         National Kidney Disease Education Program recommendations are as follows:  GFR calculation is accurate only with a steady state creatinine  Chronic Kidney disease less than 60 ml/min/1 73 sq  meters  Kidney failure less than 15 ml/min/1 73 sq  meters      Hepatic function panel [[id number]]  (Abnormal) Collected:  01/18/19 1152    Lab Status:  Final result Specimen:  Blood from Arm, Left Updated:  01/18/19 1220     Total Bilirubin 0 30 mg/dL      Bilirubin, Direct 0 02 mg/dL      Alkaline Phosphatase 98 U/L      AST 37 U/L      ALT 28 U/L      Total Protein 6 3 (L) g/dL      Albumin 2 6 (L) g/dL     Salicylate level [[id number]]  (Abnormal) Collected:  01/18/19 1152    Lab Status:  Final result Specimen:  Blood from Arm, Left Updated:  19/82/76 1245     Salicylate Lvl <3 (L) mg/dL     Acetaminophen level [[id number]]  (Abnormal) Collected:  01/18/19 1152    Lab Status:  Final result Specimen:  Blood from Arm, Left Updated:  01/18/19 1219     Acetaminophen Level <2 (L) ug/mL     Ethanol [[id number]]  (Normal) Collected:  01/18/19 1152    Lab Status:  Final result Specimen:  Blood from Arm, Left Updated:  01/18/19 1218     Ethanol Lvl <3 mg/dL     CBC and differential [[id number]] Collected:  01/18/19 1152    Lab Status:  Final result Specimen:  Blood from Arm, Left Updated:  01/18/19 1203     WBC 8 26 Thousand/uL      RBC 4 11 Million/uL      Hemoglobin 12 2 g/dL      Hematocrit 36 9 %      MCV 90 fL      MCH 29 7 pg      MCHC 33 1 g/dL      RDW 12 7 %      MPV 10 7 fL      Platelets 304 Thousands/uL      nRBC 0 /100 WBCs      Neutrophils Relative 72 %      Immat GRANS % 0 %      Lymphocytes Relative 23 %      Monocytes Relative 5 %      Eosinophils Relative 0 %      Basophils Relative 0 %      Neutrophils Absolute 5 87 Thousands/µL      Immature Grans Absolute 0 02 Thousand/uL      Lymphocytes Absolute 1 89 Thousands/µL      Monocytes Absolute 0 45 Thousand/µL      Eosinophils Absolute 0 00 Thousand/µL      Basophils Absolute 0 03 Thousands/µL                  No orders to display              Procedures  Procedures       Phone Contacts  ED Phone Contact    ED Course  ED Course as of Jan 18 1445 Fri Jan 18, 2019   1158 Discussed case with Toxicology, patient GCS 15, will have drink charcoal, will discussed with OBGYN regarding disposition    [phone] Discussed case with OBGYN requesting transfer to [facility] Multiple repeat evaluations GCS 15, delay in transfer due to transfer center being overwhelmed by a volume of transfers at this time                                MDM  Number of Diagnoses or Management Options  27 weeks gestation of pregnancy: new and requires workup  Intentional benzodiazepine overdose, initial encounter [facility]): new and requires workup     Amount and/or Complexity of Data Reviewed  Clinical lab tests: ordered and reviewed  Review and summarize past medical records: yes  Discuss the patient with other providers: yes  Independent visualization of images, tracings, or specimens: yes      The patient presented with a condition in which there was a high probability of imminent or life-threatening deterioration, and critical care services (excluding separately billable procedures) totalled 30-74 minutes (50 minutes)          Disposition  Final diagnoses:   Intentional benzodiazepine overdose, initial encounter ([facility] [address] )   27 weeks gestation of pregnancy     Time reflects when diagnosis was documented in both MDM as applicable and the Disposition within this note     Time User Action Codes Description Comment    1/18/2019 12:26 PM Stephen Abarca Intentional benzodiazepine overdose, initial encounter ([facility] [address] )     1/18/2019  2:42 PM Radha Crowell [Z3A 27] 27 weeks gestation of pregnancy       ED Disposition     ED Disposition Condition Comment    Transfer to Another [facility] should be transferred out to [facility]      MD Documentation      Most Recent Value   Patient Condition  The patient has been stabilized such that within reasonable medical probability, no material deterioration of the patient condition or the condition of the unborn child(chetan) is likely to result from the transfer   Reason for Transfer  Level of Care needed not available at this facility   Benefits of Transfer  -- [[facility]]   Risks of Transfer  Possible worsening of condition or death during transfer   Accepting Physician  Dr Tom Win, Sacha Robin   Provider Certification  General risk, such as traffic hazards, adverse weather conditions, rough terrain or turbulence, possible failure of equipment (including vehicle or aircraft), or consequences of actions of persons outside the control of the transport personnel      RN Documentation      [facility] [address] [facility] Name, [address] [place]    None         Patient's Medications   Discharge Prescriptions    No medications on file     No discharge procedures on file      ED Provider  Electronically Signed by           She Parra DO  01/18/19 1114 CHARLIE Conrad DO  01/18/19 7802

## 2019-01-18 NOTE — ED NOTES
Took over watch from [address]  Patient appears to be asleep  Family is at bedside  No signs of distress  Will continue visual and auditory monitoring       Vito Manzanares  01/18/19 6048

## 2019-01-18 NOTE — ED NOTES
Pt requested fetal heart tones to be listened to   Found fetal heart rate in RLQ [address] Aristeo Armstrong RN  01/18/19 8822

## 2019-01-18 NOTE — ED NOTES
Pt has been calm requested for a bedpan  Pt vitals sign are good, will continue to monitor at this time    at bedside       [address]  01/18/19 5270

## 2019-01-18 NOTE — ED NOTES
Pt is under observation at this time, speaking with  at bedside   Will continue to monitor      [address] [facility]  01/18/19 7039

## 2019-01-18 NOTE — ED NOTES
Pt laying in bed, eyes closed respirations easy and unlabored  Pt's fiance is at bedside  Will continue to monitor        Lisa Ortiz  01/18/19 7280

## 2019-01-18 NOTE — ED NOTES
Pts fiance requesting nurse to do fetal heart tones   RN made aware       Corey Sanchez  01/18/19 1378

## 2019-01-18 NOTE — ED NOTES
Pt finished charcoal, Spouse at bedside, will continue to monitor at this time        Amy Vyas Regidor  01/18/19 8113

## 2019-01-18 NOTE — ED NOTES
Pt requesting something to eat  Dr Chantelle Grimm said that pt is allowed to eat  Pt's farhad Fernandesrashard Mary Kate is going to get her food  He is requesting that if we get transport information while he is gone, we call him   His phone number is [phone]     Lisa Ortiz  01/18/19 1352       Lisa Ortiz  01/18/19 5973

## 2019-01-18 NOTE — ED NOTES
Pt sitting up in bed eating at this time  Pt is very lethargic at this time  Will continue to monitor       Bridgettnasreen Mensah  01/18/19 1640

## 2019-01-19 LAB
ALBUMIN SERPL BCP-MCNC: 2.1 G/DL (ref 3.5–5)
ALP SERPL-CCNC: 81 U/L (ref 46–116)
ALT SERPL W P-5'-P-CCNC: 18 U/L (ref 12–78)
ANION GAP SERPL CALCULATED.3IONS-SCNC: 7 MMOL/L (ref 4–13)
AST SERPL W P-5'-P-CCNC: 16 U/L (ref 5–45)
BILIRUB SERPL-MCNC: <0.1 MG/DL (ref 0.2–1)
BUN SERPL-MCNC: 9 MG/DL (ref 5–25)
CALCIUM SERPL-MCNC: 7.8 MG/DL (ref 8.3–10.1)
CHLORIDE SERPL-SCNC: 110 MMOL/L (ref 100–108)
CO2 SERPL-SCNC: 22 MMOL/L (ref 21–32)
CREAT SERPL-MCNC: 0.59 MG/DL (ref 0.6–1.3)
ERYTHROCYTE [DISTWIDTH] IN BLOOD BY AUTOMATED COUNT: 12.9 % (ref 11.6–15.1)
GFR SERPL CREATININE-BSD FRML MDRD: 126 ML/MIN/1.73SQ M
GLUCOSE SERPL-MCNC: 83 MG/DL (ref 65–140)
HCT VFR BLD AUTO: 29.8 % (ref 34.8–46.1)
HGB BLD-MCNC: 10.1 G/DL (ref 11.5–15.4)
MAGNESIUM SERPL-MCNC: 1.8 MG/DL (ref 1.6–2.6)
MCH RBC QN AUTO: 30.6 PG (ref 26.8–34.3)
MCHC RBC AUTO-ENTMCNC: 33.9 G/DL (ref 31.4–37.4)
MCV RBC AUTO: 90 FL (ref 82–98)
PHOSPHATE SERPL-MCNC: 3 MG/DL (ref 2.7–4.5)
PLATELET # BLD AUTO: 244 THOUSANDS/UL (ref 149–390)
PMV BLD AUTO: 11 FL (ref 8.9–12.7)
POTASSIUM SERPL-SCNC: 3.6 MMOL/L (ref 3.5–5.3)
PROT SERPL-MCNC: 5.3 G/DL (ref 6.4–8.2)
RBC # BLD AUTO: 3.3 MILLION/UL (ref 3.81–5.12)
SODIUM SERPL-SCNC: 139 MMOL/L (ref 136–145)
WBC # BLD AUTO: 6.44 THOUSAND/UL (ref 4.31–10.16)

## 2019-01-19 PROCEDURE — 99252 IP/OBS CONSLTJ NEW/EST SF 35: CPT | Performed by: OBSTETRICS & GYNECOLOGY

## 2019-01-19 PROCEDURE — 83735 ASSAY OF MAGNESIUM: CPT | Performed by: INTERNAL MEDICINE

## 2019-01-19 PROCEDURE — 99233 SBSQ HOSP IP/OBS HIGH 50: CPT | Performed by: NURSE PRACTITIONER

## 2019-01-19 PROCEDURE — 84100 ASSAY OF PHOSPHORUS: CPT | Performed by: INTERNAL MEDICINE

## 2019-01-19 PROCEDURE — 85027 COMPLETE CBC AUTOMATED: CPT | Performed by: INTERNAL MEDICINE

## 2019-01-19 PROCEDURE — 80053 COMPREHEN METABOLIC PANEL: CPT | Performed by: INTERNAL MEDICINE

## 2019-01-19 RX ADMIN — Medication 1 TABLET: at 09:37

## 2019-01-19 RX ADMIN — METHADONE HYDROCHLORIDE 85 MG: 10 CONCENTRATE ORAL at 09:35

## 2019-01-19 NOTE — ASSESSMENT & PLAN NOTE
[facility] [place] was informed of patient's status     [facility]  [facility] requested that patient be admitted to medicine service for further monitoring for suicidal ideation and benzodiazepine overdose    Will consult OB  Fetal heart tones as per OB

## 2019-01-19 NOTE — H&P
H&P- Dilshad Ford 1990, 33 y o  female MRN: [id number]    Unit/Bed#: [facility] 607-01 Encounter: [id number]    Primary Care Provider: Aidan Jameson MD   Date and time admitted to hospital: 1/18/2019  7:27 PM        * Drug overdose   Assessment & Plan    Continue one-to-one observation  Monitor symptoms closely  Psychiatric consultation  Ob for fetal heart tones  Step-down     27 weeks gestation of pregnancy   Assessment & Plan    OB / Eladio Valenzuela was informed of patient's status     Already contacted by [employer]  They requested that patient be admitted to medicine service for further monitoring for suicidal ideation and benzodiazepine overdose  Will consult OB  Fetal heart tones as per OB     Drug abuse and dependence [facility])   Assessment & Plan    Patient currently on methadone  VTE Prophylaxis: Enoxaparin (Lovenox)  / sequential compression device   Code Status:  Full code  Discussion with family:  Melvin Akil is at bedside    Anticipated Length of Stay:  Patient will be admitted on an Inpatient basis with an anticipated length of stay of  greater than 2 midnights  Justification for Hospital Stay:  Needs further hemodynamic status  Needs one-to-one observation  Need psychiatric consultation  Total Time for Visit, including Counseling / Coordination of Care: 45 minutes  Greater than 50% of this total time spent on direct patient counseling and coordination of care  Chief Complaint:  Drug overdose    History of Present Illness:    Dilshad Ford is a 33 y o  female who presents with 27 week intrauterine pregnancy history with apparent intentional drug overdose  Patient was reported to take 15 mg of Klonopin  The patient's reportedly was in an argument with her boyfriend and subsequently took 30 Klonopin 0 5 tablets  She is estimated to have 15 mg of Klonopin intake  She was evaluated at [facility] and subsequently transferred here to monitor her status with her pregnancy    Patient reportedly states that she has been under significant stress  She reports that she has been arguing with her fiance and out of anger she took the Klonopin tablets  She immediately reported that she told her fiancee and he brought her to the hospital for further evaluation  She denies suicidal ideation reports this to be more of a stress reaction/cry for help rather than depression  She denies any command auditory or visual hallucinations  She denies any alcohol use  She has a known history of substance abuse and currently is on methadone treatment  She picks up her methadone doses daily   She presents to the hospital for further evaluation and was transferred here due to her pregnancy  Review of Systems:    Review of Systems   Constitutional: Negative for chills, diaphoresis and fatigue  Eyes: Negative for redness  Genitourinary: Negative for difficulty urinating and enuresis  Musculoskeletal: Negative for arthralgias and back pain  Neurological: Negative for seizures, speech difficulty and numbness  Psychiatric/Behavioral: Negative for agitation, behavioral problems and confusion  Past Medical and Surgical History:     Past Medical History:   Diagnosis Date    Anxiety     Asthma     childhood    Cancer ([facility] [address] [address] )     skin cancer    Depression     Disease of thyroid gland     nodules    Diverticulitis     Insomnia     Varicella        Past Surgical History:   Procedure Laterality Date    BACK SURGERY      INNER EAR SURGERY      tubes,bilateral    NECK SURGERY      TONSILLECTOMY         Meds/Allergies:    Prior to Admission medications    Medication Sig Start Date End Date Taking?  Authorizing Provider   amphetamine-dextroamphetamine (ADDERALL) 30 MG tablet Take 30 mg by mouth daily   7/21/14   Historical Provider, MD   clonazePAM (KlonoPIN) 0 5 mg tablet Take 0 5 mg by mouth daily as needed for anxiety      Historical Provider, MD   methadone (DOLOPHINE) 5 mg/5 mL solution Take 85 mg by mouth daily    Historical Provider, MD   nicotine (NICODERM CQ) 7 mg/24hr TD 24 hr patch Place 1 patch on the skin every 24 hours 1/7/19   Mozella Burkitt, MD   Prenat-Fe Carbonyl-FA-Omega 3 (ONE-A-DAY WOMENS PRENATAL 1) 28-0 8-235 MG CAPS Take 1 tablet by mouth daily 46/6/07   Celi Chicas DO     I have reviewed home medications with patient personally  Allergies: No Known Allergies    Social History:     Marital Status: Single     Patient Pre-hospital Living Situation:  Home  Patient Pre-hospital Level of Mobility:  Ambulates  Patient Pre-hospital Diet Restrictions:  Denies  Substance Use History:   History   Alcohol Use No     Comment: socially prior to confirmed pregnancy     History   Smoking Status    Current Every Day Smoker    Years: 10 00    Last attempt to quit: 11/3/2018   Smokeless Tobacco    Never Used     Comment: 3 cigarettes daily     History   Drug Use No     Comment: last use 7/5/18 currently on methadone       Family History:    Family History   Problem Relation Age of Onset    Lupus Mother     Hypertension Father     COPD Father     No Known Problems Brother     No Known Problems Maternal Grandmother     No Known Problems Maternal Grandfather     Diabetes Paternal Grandmother     Obesity Paternal Grandfather     No Known Problems Brother        Physical Exam:     Vitals:        Physical Exam    Additional Data:     Lab Results: I have personally reviewed pertinent reports          Results from last 7 days  Lab Units 01/18/19  1152   WBC Thousand/uL 8 26   HEMOGLOBIN g/dL 12 2   HEMATOCRIT % 36 9   PLATELETS Thousands/uL 289   NEUTROS PCT % 72   LYMPHS PCT % 23   MONOS PCT % 5   EOS PCT % 0       Results from last 7 days  Lab Units 01/18/19  1152   SODIUM mmol/L 137   POTASSIUM mmol/L 5 1   CHLORIDE mmol/L 105   CO2 mmol/L 23   BUN mg/dL 10   CREATININE mg/dL 0 49*   ANION GAP mmol/L 9   CALCIUM mg/dL 8 2*   ALBUMIN g/dL 2 6*   TOTAL BILIRUBIN mg/dL 0 30   ALK PHOS U/L 98 ALT U/L 28   AST U/L 37   GLUCOSE RANDOM mg/dL 72                       Imaging: I have personally reviewed pertinent reports  No orders to display         ** Please Note: This note has been constructed using a voice recognition system   **

## 2019-01-19 NOTE — DISCHARGE SUMMARY
Discharge Summary - [facility] Internal Medicine    Patient Information: Heather Alexis 33 y o  female MRN: [id number]  Unit/Bed#: [facility] 607-01 Encounter: [id number]    Discharging Physician / Practitioner: JOANNA Ha Sa  PCP: Mecca Craig MD  Admission Date: 1/18/2019  Discharge Date: 01/19/19    Disposition:     Home    Reason for Admission:  Reported overdose    Discharge Diagnoses:     Principal Problem:    Drug overdose  Active Problems:    Drug abuse and dependence ([address] )    27 weeks gestation of pregnancy  Resolved Problems:    * No resolved hospital problems  *      Consultations During Hospital Stay:  · Dr Coni Lomas psychiatry  ·  Dr Georges Basurto obgyn  · Dr Tiki Bentley toxicology     Procedures Performed:     · Hemoglobin 10 1 hematocrit 29 8  · Acetaminophen level less than 2  · Salicylate level less than 3  · Medical alcohol less than 3    Significant Findings / Test Results:     · See above    Incidental Findings:   · See above     Test Results Pending at Discharge (will require follow up): · None      Outpatient Tests Requested:  · Follow-up per PCP and OB gyn    Complications:  None    Hospital Course:     Heather Alexis is a 33 y o  female patient who originally presented to the hospital on 1/18/2019 due to reported overdose  Patient has a history of opioid use currently on methadone maintenance  Patient also has a history of ADHD and anxiety has been prescribed Adderall for many years and also on Klonopin  She denied any prior psychiatric hospitalizations in the past but reported that she has struggled with anxiety and depression in the past   She is now noted to be 27 weeks pregnant arrives at [facility] with reported inch potential drug overdose  She reported to me that she had taken 15 to 30 tablets the handful  She reportedly got into an argument with her boyfriend and subsequently took the [address]  She reported that she took approximately 15 mg total of Klonopin    She was transferred from [facility] to monitor the status of her pregnancy  She reports that she has been under tremendous amount of stress she had been arguing with her fiance and out of anger she took the Klonopin impulsively  She immediately within minutes told her fiance/significant other that she had taken the medication and he brought her to the hospital for further evaluation  She denies any suicidal ideations reports that her reaction was more hormonal and impulsive and due to stress and anger that she took it  She denied any any hallucination she  at denied any alcohol use she reports that she does have a history of substance abuse she picks up her methadone daily  Patient was seen by OBGYN and examine by Dr Juliet Robles recommendations were to continue her methadone to notify state line that the patient had been admitted   OBGYN were monitoring fetal heart tones, from an OB gyn perspective patient is stable for discharge and will follow up outpatient  She was seen by Psychiatry who spent significant amount of time with patient's fiancee/significant other and the patient  Per Psychiatry at this time patient is stable for discharge should follow up with outpatient behavioral health  I have spent greater than 60 min talking with patient in regard to coping  mechanism and dealing with stress  Both are in agreement that this act was done impulsively and there was no desire to harm herself or her baby  She will follow up with her primary care physician within the next week along with OBGYN at [address] [place] [address] available appointment  Main discussion has been how we get pt home on her methadone in order to prevent pt and fetal with drawal  pts clinic are unable to provide  obgyn will not order outpt and my attending will not write due to indications for withdrawal rather than pain  Discussed greater than 2 hrs with pt and boyfriend   Significant other will not allow pt to be alone infact he will return home out of much persuasion  To use help or assistance  Condition at Discharge: good     Discharge Day Visit / Exam:     Subjective:  Long discussion had with patient and significant other after having spoken to patient for approximately 20 min by herself  Patient is upset because she does not want to stay in the hospital   She reports that she intentionally did not try to harm herself she acted impulsively and out of hormonal reaction  She states she was having an argument with her significant other as he had cheated on her early on in the relationship and she had just found out  At which point she took handful of pills when asked what the handful equated to she said about 20-38 pills  Patient also reports that she cannot keep medications in her house she keeps them  with her dad who hands or her medications so that she does not have a lot of medications on hand more because of the people in the house that she lives with  But she feels that this will be helpful as it does not allow her to have many pills at once  And she will work to move away from a stressful situation deep Breath and separate from her significant other, in order to calm down  She denies any negative symptoms such as shortness of breath chest pain palpitations abdominal pain cramping no bloody urine no vaginal bleeding no diarrhea nausea or vomiting at this time  Vitals: Blood Pressure: 96/62 (01/19/19 0301)  Pulse: 78 (01/19/19 0301)  Temperature: 98 4 °F (36 9 °C) (01/19/19 0301)  Temp Source: Oral (01/18/19 2335)  Respirations: 18 (01/19/19 0301)  Height: 5' 4" (162 6 cm) (01/18/19 2041)  Weight - Scale: 80 7 kg (178 lb) (01/18/19 2041)  SpO2: 97 % (01/19/19 0301)  Exam:   Physical Exam   Constitutional: She is oriented to person, place, and time  No distress  HENT:   Head: Normocephalic and atraumatic  Eyes: Conjunctivae are normal  Right eye exhibits no discharge  Left eye exhibits no discharge  No scleral icterus  Neck: No JVD present  No tracheal deviation present  No thyromegaly present  Pulmonary/Chest: No stridor  No respiratory distress  She has no wheezes  She has rales (crackles bl )  She exhibits no tenderness  Abdominal: She exhibits no distension and no mass  There is no tenderness  There is no rebound and no guarding  Round (pregnant)    Musculoskeletal: She exhibits no edema, tenderness or deformity  Lymphadenopathy:     She has no cervical adenopathy  Neurological: She is oriented to person, place, and time  Skin: Skin is warm  No rash noted  She is not diaphoretic  No erythema  No pallor  Discussion with Family: significant other at the bedside  Discharge instructions/Information to patient and family:   See after visit summary for information provided to patient and family  Provisions for Follow-Up Care:  See after visit summary for information related to follow-up care and any pertinent home health orders  Planned Readmission: no plan      Discharge Statement:  I spent 45 minutes discharging the patient  This time was spent on the day of discharge  I had direct contact with the patient on the day of discharge  Greater than 50% of the total time was spent examining patient, answering all patient questions, arranging and discussing plan of care with patient as well as directly providing post-discharge instructions  Additional time then spent on discharge activities  Discharge Medications:  See after visit summary for reconciled discharge medications provided to patient and family        ** Please Note: This note has been constructed using a voice recognition system **

## 2019-01-19 NOTE — ASSESSMENT & PLAN NOTE
Continue one-to-one observation  Monitor symptoms closely  Psychiatric consultation  Ob for fetal heart tones  Step-down

## 2019-01-19 NOTE — PROGRESS NOTES
Progress Note - OB/GYN   Queta Gomez 33 y o  female MRN: [id number]  Unit/Bed#: [facility] 607-01 Encounter: [id number]    Assessment:   33 y o   at 27w3d with attempted suicide via benzodiazepine overdose on methadone maintenance for history of opioid abuse, currently stable  HOD2     Plan:      Benzodiazepine overdose  - Per primary team  - Psych consult recommended with continued follow up as outpatient, high risk for postpartum depression/psychosis  - Monitor for signs of withdrawal     27 week gestation  - NST daily  - Recommend NICU consult  - Daily PNV     Hx of opioid abuse  - Continue prior methadone regimen as inpatient    Subjective/Objective       Subjective:     Pain: no  Tolerating PO: yes  Voiding: yes  Flatus: yes  BM: yes  Ambulating: yes  Chest pain: no  Shortness of breath: no  Leg pain: no    Denies contractions/cramping, LOF, VB  Endorses fetal movement    Objective:     Vitals: Blood pressure 98/67, pulse 85, temperature 98 2 °F (36 8 °C), temperature source Oral, resp  rate 16, height 5' 4" (1 626 m), weight 80 7 kg (178 lb), last menstrual period 2018, SpO2 98 %  Physical Exam:     Physical Exam   Constitutional: She is oriented to person, place, and time  She appears well-developed and well-nourished  No distress  Cardiovascular: Normal rate, regular rhythm, normal heart sounds and intact distal pulses  Pulmonary/Chest: Effort normal and breath sounds normal  No respiratory distress  She has no wheezes  Abdominal: Soft  Bowel sounds are normal  She exhibits no distension  There is no tenderness  There is no rebound  Musculoskeletal: Normal range of motion  Neurological: She is alert and oriented to person, place, and time  Skin: Skin is warm and dry  She is not diaphoretic         Lab Results   Component Value Date    WBC 8 26 2019    HGB 12 2 2019    HCT 36 9 2019    MCV 90 2019     2019     Conception MD Adina  OB/GYN PGY-3  2019 5: 23 AM

## 2019-01-19 NOTE — ASSESSMENT & PLAN NOTE
[facility] / Surendra Mccain was informed of patient's status  They requested that patient be admitted to medicine service for further monitoring for suicidal ideation and benzodiazepine overdose  Seen by psych yesterday who cleared the pt however pt was unable to return home due to need to get her methadone dosing   She determined she would stay overnight receive dose then maybe stay till Monday am for early am dose of methadone however, last night a security alert was called due to pt eloping from the hospital and threatening to harm herself     She was placed on 1:1 with reconsult to psych  Pt was seen today and has agreed to a 201 voluntary committment   Appreciate ob who are seeing pt regularly   Fetal heart tones as per OB
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
## 2019-01-19 NOTE — NURSING NOTE
Rounding update:  Jemal Drape with SLIM aware pt refused am lovenox dose today  Also, PPHP3 OB charge nurse was called regarding checking fetal heart tones today  Patient states she feels frequent fetal movement this morning

## 2019-01-19 NOTE — ASSESSMENT & PLAN NOTE
Continue one-to-one observation at this time pt was seen after initial admission sp reported overdose  Last night pt left the hospital while threatening to harm herself and was brought back by security and placed on a !:! Observation   Pt this am, understands   Monitor symptoms closely  Psychiatric consultation appreciated   Ob for fetal heart tones  Discontinued step down

## 2019-01-19 NOTE — UTILIZATION REVIEW
Initial Clinical Review    Admission: Date/Time/Statement: 1/18/19 @ 1927   Orders Placed This Encounter   Procedures    Inpatient Admission     Standing Status:   Standing     Number of Occurrences:   1     Order Specific Question:   Admitting Physician     Answer:   Camelia Howell     Order Specific Question:   Level of Care     Answer:   Level 2 Stepdown / HOT [14]     Order Specific Question:   Estimated length of stay     Answer:   More than 2 Midnights     Order Specific Question:   Certification     Answer:   I certify that inpatient services are medically necessary for this patient for a duration of greater than two midnights  See H&P and MD Progress Notes for additional information about the patient's course of treatment  ED: Date/Time/Mode of Arrival: Tx from [address] [place] [address] ED 1/18  Chief Complaint:  Drug overdose  History of Illness: 33 y o  female who presents with 27 week intrauterine pregnancy history with apparent intentional drug overdose  Patient was reported to take 15 mg of Klonopin  The patient's reportedly was in an argument with her boyfriend and subsequently took 30 Klonopin 0 5 tablets  She is estimated to have 15 mg of Klonopin intake  She was evaluated at [address] [place] [address] subsequently transferred here to monitor her status with her pregnancy  Patient reportedly states that she has been under significant stress  She reports that she has been arguing with her fiance and out of anger she took the Klonopin tablets  She immediately reported that she told her fiancee and he brought her to the hospital for further evaluation  She denies suicidal ideation reports this to be more of a stress reaction/cry for help rather than depression  She denies any command auditory or visual hallucinations  She denies any alcohol use  She has a known history of substance abuse and currently is on methadone treatment  She picks up her methadone doses daily     She presents to the hospital for further evaluation and was transferred here due to her pregnancy  ED Vital Signs:   ED Triage Vitals   Temperature Pulse Respirations Blood Pressure SpO2   01/18/19 1937 01/18/19 1937 01/18/19 1937 01/18/19 2041 01/18/19 2041   98 2 °F (36 8 °C) 75 16 103/65 97 %      Temp Source Heart Rate Source Patient Position - Orthostatic VS BP Location FiO2 (%)   01/18/19 2041 01/18/19 2041 01/18/19 2041 01/18/19 2041 --   Oral Monitor Lying Left arm       Pain Score       01/18/19 2034       No Pain        Wt Readings from Last 1 Encounters:   01/18/19 80 7 kg (178 lb)     Vital Signs (abnormal): WNL  Pertinent Labs/Diagnostic Test Results: Cr 0 49, Ca 8 2, Tl proteinn 6 3, Albumin 2 6    Past Medical/Surgical History: Active Ambulatory Problems     Diagnosis Date Noted    Teratogen exposure in current pregnancy 11/06/2018    Adult attention deficit disorder 09/04/2012    Anxiety 11/20/2012    Asthma with acute exacerbation 09/04/2012    Drug abuse and dependence ([facility] ) 09/25/2014    Multinodular goiter 11/25/2015    Nonspecific abnormal results of function study of thyroid 09/04/2012    27 weeks gestation of pregnancy 12/06/2018    Methadone maintenance treatment affecting pregnancy in second trimester ([facility] ) 01/04/2019    Fetal cerebral dangling choroid 01/04/2019    Poor fetal growth affecting management of mother in second trimester 01/04/2019     Past Medical History:   Diagnosis Date    Anxiety     Asthma     [facility]     Depression     Disease of thyroid gland     Diverticulitis     Insomnia     Varicella      Admitting Diagnosis: Intentional benzodiazepine overdose, initial encounter ([facility] ) Ranjan Winslow  Age/Sex: 33 y o  female     Assessment/Plan: 34 y/o female 27 wks pregnant tx'd from [facility] ED after taking ~30 15 mg Klonipan after an argument with SO  Denies SI    Admit to M/S/Tele unit, 1:1 staff observation, psych & OG consult, fetal heart tone monitoring q shift Admission Orders:  Reg diet  Activity as mike    Scheduled Meds:   Current Facility-Administered Medications:  enoxaparin 40 mg Subcutaneous Daily   methadone 85 mg Oral Daily   prenatal multivitamin 1 tablet Oral Daily       OB consult:  NST daily, NICU consult, daily PNV  Continue prior methadone regimen     Psych consult:  Assessment:  78-year-old female with history of opioid use on methadone maintenance who presented after an intentional overdose on 30 pills of Klonopin 0 5 mg  She is currently 32 month pregnant and has been struggling with strained relationship with her fiance  Patient has no history of suicide attempts in the past and no history of psychiatric hospitalizations  Patient seems remorseful about what happened and at this point she has no intention to hurt herself or her fetus      Plan:   1  Patient can be discharged to follow up with an outpatient and she does not seem to be an imminent risk to self or others  2  Her fiance seems to be supportive even though he still very upset that his daughter could have been hurt  3  Patient is advised to stop using Klonopin especially she is on methadone  Patient gave permission to contact her outpatient psychiatrist who is prescribing her the Drew Tejada 13 and does not know that she is currently is on methadone in a methadone maintenance program       [facility] Utilization Review Department  Phone: [phone]; Fax [phone]  [email or web address]  ATTENTION: Please call with any questions or concerns to [phone]  and carefully listen to the prompts so that you are directed to the right person  Send all requests for admission clinical reviews, approved or denied determinations and any other requests to fax [phone]   All voicemails are confidential

## 2019-01-20 PROCEDURE — 99231 SBSQ HOSP IP/OBS SF/LOW 25: CPT | Performed by: OBSTETRICS & GYNECOLOGY

## 2019-01-20 PROCEDURE — 99233 SBSQ HOSP IP/OBS HIGH 50: CPT | Performed by: NURSE PRACTITIONER

## 2019-01-20 RX ORDER — LANOLIN ALCOHOL/MO/W.PET/CERES
6 CREAM (GRAM) TOPICAL
Status: DISCONTINUED | OUTPATIENT
Start: 2019-01-20 | End: 2019-01-21 | Stop reason: HOSPADM

## 2019-01-20 RX ORDER — HYDROXYZINE HYDROCHLORIDE 25 MG/1
25 TABLET, FILM COATED ORAL EVERY 6 HOURS PRN
Status: DISCONTINUED | OUTPATIENT
Start: 2019-01-20 | End: 2019-01-21 | Stop reason: HOSPADM

## 2019-01-20 RX ADMIN — METHADONE HYDROCHLORIDE 85 MG: 10 CONCENTRATE ORAL at 10:00

## 2019-01-20 RX ADMIN — Medication 1 TABLET: at 10:00

## 2019-01-20 NOTE — ACP (ADVANCE CARE PLANNING)
Patient's boyfriend/fiancee called sounding upset that he couldn't be in the room and had wanted her to be put back on continual observation  Or for her to be monitored more closely  He stated that the attending physician would back up everything he said and that he was the only reason the patient wasn't 302  Slim notified of situation

## 2019-01-20 NOTE — PROGRESS NOTES
Patient eloped and was found at the bus stop by security, who brought patient back to her room  Her mother is with her at bedside  Per patient, she was not trying to leave but "following her boyfriend" because she wanted him to stay the night  At this time, patient is agreeable to staying in the hospital  Mother also agreed to stay overnight  However, with concerns for self harm and elopement will place back on continual 1:1 observation  Patient also requested "something for anxiety that isn't a benzo " I offered melatonin and atarax but patient refused  Will place orders prn if she changes her mind

## 2019-01-20 NOTE — PROGRESS NOTES
Progress Note - Hector Sanches 1990, 33 y o  female MRN: [id number]    Unit/Bed#: [facility] 607-01 Encounter: [id number]    Primary Care Provider: Jaiden Pearson MD   Date and time admitted to hospital: 1/18/2019  7:27 PM        * Drug overdose   Assessment & Plan    Continue one-to-one observation at this time pt was seen after initial admission sp reported overdose  Last night pt left the hospital while threatening to harm herself and was brought back by security and placed on a !:! Observation   Pt this am, understands   Monitor symptoms closely  Psychiatric consultation appreciated   Ob for fetal heart tones  Discontinued step down      27 weeks gestation of pregnancy   Assessment & Plan    OB / GYN was informed of patient's status  They requested that patient be admitted to medicine service for further monitoring for suicidal ideation and benzodiazepine overdose  Seen by psych yesterday who cleared the pt however pt was unable to return home due to need to get her methadone dosing   She determined she would stay overnight receive dose then maybe stay till Monday am for early am dose of methadone however, last night a security alert was called due to pt eloping from the hospital and threatening to harm herself  She was placed on 1:1 with reconsult to psych  Pt was seen today and has agreed to a 201 voluntary committment   Appreciate ob who are seeing pt regularly   Fetal heart tones as per OB       Drug abuse and dependence [facility])   Assessment & Plan    Patient currently on methadone  VTE Pharmacologic Prophylaxis:   Pharmacologic: Enoxaparin (Lovenox)/pt refusing lovenox therapy   Mechanical VTE Prophylaxis in Place: Yes    Patient Centered Rounds: I have performed bedside rounds with nursing staff today  Discussions with Specialists or Other Care Team Provider: nursing     Education and Discussions with Family / Patient: patient     Time Spent for Care: 45 minutes    More than 50% of total time spent on counseling and coordination of care as described above  Current Length of Stay: 2 day(s)    Current Patient Status: Inpatient   Certification Statement: The patient will continue to require additional inpatient hospital stay due to pt on  to  psych admission due to second evaluation     Discharge Plan: to psych unit medically stable     Code Status: Level 1 - Full Code      Subjective:   Long conversation had with pt the father of the baby and the pts daughter  She relays the story of leaving last night to follow her significant other as they had been fighting  She went out a security alert was called and pt was brought back where she threatened to harm herself  This am she understands that this may have caused a problem in regard to her discharge  She has no complaints is merely here waiting for her methadone treatment  No n/v/d pt was also noted to be taking her own nicotine patches  Objective:     Vitals:   Temp (24hrs), Av 2 °F (36 8 °C), Min:98 2 °F (36 8 °C), Max:98 24 °F (36 8 °C)    Temp:  [98 2 °F (36 8 °C)-98 24 °F (36 8 °C)] 98 24 °F (36 8 °C)  HR:  [70] 70  Resp:  [18] 18  BP: (105)/(64) 105/64  SpO2:  [97 %] 97 %  Body mass index is 30 55 kg/m²  Input and Output Summary (last 24 hours): Intake/Output Summary (Last 24 hours) at 19 1607  Last data filed at 19 1300   Gross per 24 hour   Intake             1080 ml   Output                0 ml   Net             1080 ml       Physical Exam:     Physical Exam   Constitutional: She is oriented to person, place, and time  She appears well-developed and well-nourished  No distress  HENT:   Head: Normocephalic and atraumatic  Eyes: Conjunctivae are normal  Right eye exhibits no discharge  Left eye exhibits no discharge  Neck: No JVD present  No tracheal deviation present  Cardiovascular: Normal rate  Exam reveals no gallop and no friction rub  No murmur heard    Pulmonary/Chest: Effort normal  No stridor  No respiratory distress  She has no wheezes  She has no rales  She exhibits no tenderness  Abdominal: She exhibits no distension and no mass  There is no tenderness  There is no rebound and no guarding  pregnant   Musculoskeletal: Normal range of motion  She exhibits no edema or tenderness  Neurological: She is oriented to person, place, and time  Skin: She is not diaphoretic  Additional Data:     Labs:      Results from last 7 days  Lab Units 01/19/19  0445 01/18/19  1152   WBC Thousand/uL 6 44 8 26   HEMOGLOBIN g/dL 10 1* 12 2   HEMATOCRIT % 29 8* 36 9   PLATELETS Thousands/uL 244 289   NEUTROS PCT %  --  72   LYMPHS PCT %  --  23   MONOS PCT %  --  5   EOS PCT %  --  0       Results from last 7 days  Lab Units 01/19/19  0445   SODIUM mmol/L 139   POTASSIUM mmol/L 3 6   CHLORIDE mmol/L 110*   CO2 mmol/L 22   BUN mg/dL 9   CREATININE mg/dL 0 59*   ANION GAP mmol/L 7   CALCIUM mg/dL 7 8*   ALBUMIN g/dL 2 1*   TOTAL BILIRUBIN mg/dL <0 10*   ALK PHOS U/L 81   ALT U/L 18   AST U/L 16   GLUCOSE RANDOM mg/dL 83                           * I Have Reviewed All Lab Data Listed Above  * Additional Pertinent Lab Tests Reviewed: All Labs Within Last 24 Hours Reviewed    Imaging:    Imaging Reports Reviewed Today Include: reviewed     Recent Cultures (last 7 days):           Last 24 Hours Medication List:     Current Facility-Administered Medications:  enoxaparin 40 mg Subcutaneous Daily Hetul Ventura, DO   hydrOXYzine HCL 25 mg Oral Q6H PRN Jody Hahn PA-C   melatonin 6 mg Oral HS PRN Dorthula Rubinstein Held, PA-C   methadone 85 mg Oral Daily Hetul Ventura, DO   [START ON 1/21/2019] nicotine 7 mg Transdermal Daily Tyler Soulier, CRNP   prenatal multivitamin 1 tablet Oral Daily Hetul Ventura, DO        Today, Patient Was Seen By: Tyler Soulier, CRNP    ** Please Note: Dictation voice to text software may have been used in the creation of this document   **

## 2019-01-20 NOTE — PROGRESS NOTES
Patient was noted to have eloped from the hospital  Occurred at approximately 11pm  Was found outside ED doors  Hospital supervisor was notified  Her fidaniele was waiting outside the building  Approximately 30 minutes prior her fidaniele called the nursing stating she had to be continually watched  Her mother was set to arrive shortly  The patient's mother arrived then patient proceeded to exit P6 floor unnoticed  Upon exit from the hospital doors, was found by security, she then proceeded to return to her P6 room  She requires 1:1 monitoring given high flight risk, expression to harm self to hospital ED staff  SLIM in agreeance  Dr Alivia Alves notified

## 2019-01-20 NOTE — SOCIAL WORK
[facility] was in contact with [facility] of [address] [place] [address] who reported not having any female psych beds within the network  USAMA will continue to follow

## 2019-01-20 NOTE — PROGRESS NOTES
Progress Note - OB/GYN   Jorje T[place] 33 y o  female MRN: [id number]  Unit/Bed#: [facility] 607-01 Encounter: [id number]    Assessment:   33 y o   at 27w4d with attempted suicide via benzodiazepine overdose on methadone maintenance for history of opioid abuse, currently stable  HOD3  Patient eloped last evening but was returned to room; currently resumed one-to-one observation      Plan:      Benzodiazepine overdose  - Per primary team  - s/p Psych consult, recommended continued follow up as outpatient, high risk for postpartum depression/psychosis  - Monitor for signs of withdrawal     27 week gestation  - NST daily  - Recommend NICU consult  - Daily PNV     Hx of opioid abuse  - Continue prior methadone regimen as inpatient    Subjective/Objective     Subjective:     Contractions: no  Vaginal Bleeding: no  Leakage of fluids: no  Fetal movement: present    Pain: no  Tolerating PO: yes  Voiding: yes  Flatus: yes  BM: yes  Ambulating: yes  Chest pain: no  Shortness of breath: no  Leg pain: no    Objective:     Vitals: Blood pressure 105/64, pulse 70, temperature 98 2 °F (36 8 °C), temperature source Oral, resp  rate 18, height 5' 4" (1 626 m), weight 80 7 kg (178 lb), last menstrual period 2018, SpO2 97 %  FHT: last night NST: 140bpm baseline, moderate variability, reactive: accels 10 x10, one variable decel that resolved spontaneously  [place]: no contractions    Physical Exam:     Physical Exam   Constitutional: She is oriented to person, place, and time  She appears well-developed and well-nourished  No distress  Cardiovascular: Normal rate, regular rhythm, normal heart sounds and intact distal pulses  Pulmonary/Chest: Effort normal and breath sounds normal  No respiratory distress  She has no wheezes  Abdominal: Soft  Bowel sounds are normal  She exhibits no distension  There is no tenderness  There is no rebound  Musculoskeletal: Normal range of motion     Neurological: She is alert and oriented to person, place, and time  Skin: Skin is warm and dry  She is not diaphoretic       Lab, Imaging and other studies:     Lab Results   Component Value Date    WBC 6 44 01/19/2019    HGB 10 1 (L) 01/19/2019    HCT 29 8 (L) 01/19/2019    MCV 90 01/19/2019     01/19/2019     Suzanne Wynn MD  OB/GYN PGY-3  1/20/2019 6:49 AM

## 2019-01-20 NOTE — PROGRESS NOTES
Data:  Chart reviewed, discussed with staff and patient and her family were interviewed  Patient eloped lost evening and was found at the bus stop  She was labile and per staff she made suicidal statements  Patient again is in denial today and her fiance seems very emotionally charged  It is unclear if he was a trigger for yesterday's event and now her mother is with her  Patient initially refused the recommendation for psychiatric hospitalization and when informed about the possibility of involuntary commitment she became agitated and stormed out of the room  Eventually after she was evaluated by Dr Devon Pennington who was able to deescalate her and convinced her to sign 12  Assessment/plan:  Continue one-to-one until patient is transferred to inpatient psychiatric unit

## 2019-01-21 ENCOUNTER — TRANSITIONAL CARE MANAGEMENT (OUTPATIENT)
Dept: FAMILY MEDICINE CLINIC | Facility: CLINIC | Age: 29
End: 2019-01-21

## 2019-01-21 VITALS
SYSTOLIC BLOOD PRESSURE: 91 MMHG | BODY MASS INDEX: 30.39 KG/M2 | DIASTOLIC BLOOD PRESSURE: 50 MMHG | HEART RATE: 70 BPM | OXYGEN SATURATION: 97 % | TEMPERATURE: 98.2 F | HEIGHT: 64 IN | RESPIRATION RATE: 18 BRPM | WEIGHT: 178 LBS

## 2019-01-21 PROCEDURE — 99231 SBSQ HOSP IP/OBS SF/LOW 25: CPT | Performed by: OBSTETRICS & GYNECOLOGY

## 2019-01-21 PROCEDURE — 99239 HOSP IP/OBS DSCHRG MGMT >30: CPT | Performed by: NURSE PRACTITIONER

## 2019-01-21 PROCEDURE — 99232 SBSQ HOSP IP/OBS MODERATE 35: CPT | Performed by: PSYCHIATRY & NEUROLOGY

## 2019-01-21 RX ADMIN — METHADONE HYDROCHLORIDE 85 MG: 10 CONCENTRATE ORAL at 08:10

## 2019-01-21 RX ADMIN — Medication 1 TABLET: at 08:10

## 2019-01-21 RX ADMIN — NICOTINE 7 MG: 7 PATCH TRANSDERMAL at 08:10

## 2019-01-21 NOTE — DISCHARGE SUMMARY
Discharge Summary - [facility] Internal Medicine    Patient Information: Mayela Cazares 33 y o  female MRN: [id number]  Unit/Bed#: [facility] 607-01 Encounter: [id number]    Discharging Physician / Practitioner: JOANNA Pena  PCP: Alexander Morales MD  Admission Date: 1/18/2019  Discharge Date: 01/21/19    Disposition:     Home    Reason for Admission: Self reported drug overdose     Discharge Diagnoses:     Principal Problem:    Drug overdose  Active Problems:    Drug abuse and dependence ([address] )    27 weeks gestation of pregnancy  Resolved Problems:    * No resolved hospital problems  *      Consultations During Hospital Stay:  · Dr Aditya Hedrick and dr Salma Ryan psych  · Dr Anton Crisostomo   · Dr Cade Sánchez toxicology     Procedures Performed:     · Labs stable   · Acetaminophen < 2  · Salicylate < 3  · Medical etoh < 3      Significant Findings / Test Results:     · See above    Incidental Findings:   · none    Test Results Pending at Discharge (will require follow up): · None      Outpatient Tests Requested:  · Follow up with pcp in one week  · Follow up with obgyn at next AT APPOINTMENT   ·     Complications:  None     Hospital Course:     Mayela Cazares is a 33 y o  female patient who originally presented to the hospital on 1/18/2019 due to reported overdose  Patient has a history of opioid use currently on methadone maintenance  Patient also has a history of ADHD and anxiety has been prescribed Adderall for many years and also on Klonopin  She denied any prior psychiatric hospitalizations in the past but reported that she has struggled with anxiety and depression in the past   She is now noted to be 27 weeks pregnant arrives at [facility] with reported inch potential drug overdose  She reported to me that she had taken 15 to 30 tablets the handful  She reportedly got into an argument with her boyfriend and subsequently took the [address]  She reported that she took approximately 15 mg total of Klonopin  She was transfered from [facility] to monitor the status of her pregnancy  She reported that she has been under tremendous amount of stress she had been arguing with her fiance and out of anger she took the Klonopin impulsively  She immediately within minutes told her fiance/significant other that she had taken the medication and he brought her to the hospital for further evaluation  She denies any suicidal ideations reports that her reaction was more hormonal and impulsive and due to stress and anger that she took them  She denied  any hallucination she  denied any alcohol use she reports that she does have a history of substance abuse and she picks up her methadone daily  Patient was seen by OBGYN and examined by Dr Marci Hernandez recommendations were to continue her methadone to notify state line that the patient had been admitted   OBGYN were monitoring fetal heart tones, from an OB gyn perspective patient is stable for discharge and would follow up outpatient  She was seen by Psychiatry who spent significant amount of time with patient's fiancee/significant other and the patient  Per Psychiatry the first time patient was stable for discharge and should follow up with outpatient behavioral health  I have spent greater than 60 min talking with patient in regard to coping  mechanism and dealing with stress  Both are in agreement that this act was done impulsively and there was no desire to harm herself or her baby  It was deemed appropriate for pt to remain in the hospital over the Sunday and Monday holidays as pt would be unable to obtain her methadone  After much time spent with both pt and significant other pt agreed to stay with much reluctance   That evening pt and significant other got into an argument and he left with the pt following him out into the cold/snow and rain  Pt the reportedly threatened to harm herself again was placed on a 1:1 and the reevaluated by psych today   It was determined by psych that pt was safe for discharge  I have notified OBGYn of pt discharge pt is to follow with methadone clinic and her primary care physician  Pt is medically safe for discharge  She will follow up with her primary care physician within the next week along with OBGYN at [address] [place] [address] available appointment  Main discussion has been how we get pt home on her methadone in order to prevent pt and fetal with drawal  pts clinic are unable to provide  obgyn will not order outpt and my attending will not write due to indications for withdrawal rather than pain  Discussed greater than 2 hrs with pt and boyfriend  Significant other will not allow pt to be alone infact he will return home out of much persuasion  To use help or assistance  Condition at Discharge: good      Discharge Day Visit / Exam:     Subjective:  Pt is feeling well wanting to know plan of care  No complaints   Vitals: Blood Pressure: 91/50 (01/21/19 0730)  Pulse: 70 (01/19/19 2300)  Temperature: 98 2 °F (36 8 °C) (01/21/19 0730)  Temp Source: Oral (01/21/19 0006)  Respirations: 18 (01/21/19 0730)  Height: 5' 4" (162 6 cm) (01/18/19 2041)  Weight - Scale: 80 7 kg (178 lb) (01/18/19 2041)  SpO2: 97 % (01/20/19 1940)  Exam:   Physical Exam   Constitutional: She is oriented to person, place, and time  She appears well-developed and well-nourished  No distress  HENT:   Head: Normocephalic and atraumatic  Mouth/Throat: No oropharyngeal exudate  Eyes: Conjunctivae are normal  Right eye exhibits no discharge  Left eye exhibits no discharge  No scleral icterus  Neck: No JVD present  No tracheal deviation present  No thyromegaly present  Cardiovascular: Normal rate  Exam reveals no gallop  No murmur heard  Pulmonary/Chest: No stridor  No respiratory distress  She has no wheezes  She has no rales  She exhibits no tenderness  Abdominal: She exhibits no distension and no mass  There is no tenderness   There is no rebound and no guarding  Pregnant    Musculoskeletal: She exhibits no edema, tenderness or deformity  Lymphadenopathy:     She has no cervical adenopathy  Neurological: She is oriented to person, place, and time  Skin: Skin is warm  She is not diaphoretic  Psychiatric:   Labile emotional at times        Discussion with Family: significant other over multiple visits kept up pt date    Discharge instructions/Information to patient and family:   See after visit summary for information provided to patient and family  Provisions for Follow-Up Care:  See after visit summary for information related to follow-up care and any pertinent home health orders  Planned Readmission: high risk      Discharge Statement:  I spent  45 minutes discharging the patient  This time was spent on the day of discharge  I had direct contact with the patient on the day of discharge  Greater than 50% of the total time was spent examining patient, answering all patient questions, arranging and discussing plan of care with patient as well as directly providing post-discharge instructions  Additional time then spent on discharge activities  Discharge Medications:  See after visit summary for reconciled discharge medications provided to patient and family        ** Please Note: This note has been constructed using a voice recognition system **

## 2019-01-21 NOTE — PROGRESS NOTES
Progress Note - OB/GYN   Drew Larose 33 y o  female MRN: [id number]  Unit/Bed#: [facility] 607-01 Encounter: [id number]    Assessment:   33 y o   at 27w5d with attempted suicide via benzodiazepine overdose on methadone maintenance for history of opioid abuse, currently stable  HOD4  Patient eloped on18  but was returned to room; currently resumed one-to-one observation until patient is transferred to inpatient psychiatric unit      Plan:      Benzodiazepine overdose  - Per primary team  - s/p Psych consult, recommended continued follow up as inpatient, high risk for postpartum depression/psychosis  - Monitor for signs of withdrawal     27 week gestation  - NST daily  - Recommend NICU consult  - Daily PNV     Hx of opioid abuse  - Continue prior methadone regimen as inpatient    Subjective/Objective     Subjective:     Contractions: no  Vaginal Bleeding: no  Leakage of fluids: no  Fetal movement: present    Pain: no  Tolerating PO: yes  Voiding: yes  Flatus: yes  BM: yes  Ambulating: yes  Chest pain: no  Shortness of breath: no  Leg pain: no    Objective:     Vitals: Blood pressure 94/55, pulse 70, temperature 98 1 °F (36 7 °C), resp  rate 17, height 5' 4" (1 626 m), weight 80 7 kg (178 lb), last menstrual period 2018, SpO2 97 %  FHT: NST this AM: 140bpm baseline, moderate variability, reactive: accels 10 x10  Keeler: no contractions    Physical Exam:     Physical Exam   Constitutional: She is oriented to person, place, and time  She appears well-developed and well-nourished  No distress  Cardiovascular: Normal rate, regular rhythm, normal heart sounds and intact distal pulses  Pulmonary/Chest: Effort normal and breath sounds normal  No respiratory distress  She has no wheezes  Abdominal: Soft  Bowel sounds are normal  She exhibits no distension  There is no tenderness  There is no rebound  Musculoskeletal: Normal range of motion     Neurological: She is alert and oriented to person, place, and time    Skin: Skin is warm and dry  She is not diaphoretic       Lab, Imaging and other studies:     Lab Results   Component Value Date    WBC 6 44 01/19/2019    HGB 10 1 (L) 01/19/2019    HCT 29 8 (L) 01/19/2019    MCV 90 01/19/2019     01/19/2019     Amy Ledesma MD  OB/GYN PGY-2  1/21/2019 6:22 AM

## 2019-01-21 NOTE — PLAN OF CARE
Problem: SAFETY ADULT  Goal: Patient will remain free of falls  INTERVENTIONS:  - Assess patient frequently for physical needs  -  Identify cognitive and physical deficits and behaviors that affect risk of falls  -  [place] fall precautions as indicated by assessment   - Educate patient/family on patient safety including physical limitations  - Instruct patient to call for assistance with activity based on assessment  - Modify environment to reduce risk of injury  - Consider OT/PT consult to assist with strengthening/mobility   Outcome: Progressing      Problem: DISCHARGE PLANNING  Goal: Discharge to home or other facility with appropriate resources  INTERVENTIONS:  - Identify barriers to discharge w/patient and caregiver  - Arrange for needed discharge resources and transportation as appropriate  - Identify discharge learning needs (meds, wound care, etc )  - Arrange for interpretive services to assist at discharge as needed  - Refer to Case Management Department for coordinating discharge planning if the patient needs post-hospital services based on physician/advanced practitioner order or complex needs related to functional status, cognitive ability, or social support system   Outcome: Progressing      Problem: Knowledge Deficit  Goal: Patient/family/caregiver demonstrates understanding of disease process, treatment plan, medications, and discharge instructions  Complete learning assessment and assess knowledge base    Interventions:  - Provide teaching at level of understanding  - Provide teaching via preferred learning methods   Outcome: Progressing

## 2019-01-21 NOTE — DISCHARGE INSTRUCTIONS
Benzodiazepine Overdose   WHAT YOU NEED TO KNOW:   Benzodiazepines are medicines used to treat anxiety, alcohol withdrawal, insomnia, muscle spasms, and seizures  Some examples include alprazolam, clonazepam, diazepam, lorazepam, and temazepam  An overdose can occur if you take more than the recommended amount  It can also occur if you take benzodiazepines with alcohol or certain medicines that can cause harm if taken together  DISCHARGE INSTRUCTIONS:   Have someone else call 911 for the following:   · You cannot be awakened  Seek care immediately if:   · You are extremely drowsy, or you have trouble staying awake or speaking  · Your speech is slurred, or you are confused  · You have trouble walking or moving parts of your body  · You feel extremely agitated  Contact your healthcare provider if:   · You have questions or concerns about your condition or care  Follow up with your healthcare provider as directed:  Write down your questions so you remember to ask them during your visits  Prevent a benzodiazepine overdose:   · Take your medicine exactly as directed  Do not take more of the recommended amount of benzodiazepines each time you take it  Do not take benzodiazepines more often than recommended  · Do not mix benzodiazepines with alcohol, prescription pain pills, or street drugs  The combination of these substances can cause a life-threatening overdose  · Keep benzodiazepines out of the reach of children  Store benzodiazepines in a locked cabinet or in a location that children cannot get to  Ask your healthcare provider how to dispose of any unused benzodiazepine medicines  Counseling: Your healthcare provider may recommend that you see a counselor if you are abusing benzodiazepines  © 2017 [address] [employer] Information is for End User's use only and may not be sold, redistributed or otherwise used for commercial purposes   All illustrations and images included in [facility] are the copyrighted property of A D A M [employer] Inc  or Rafa Garcia  The above information is an  only  It is not intended as medical advice for individual conditions or treatments  Talk to your doctor, nurse or pharmacist before following any medical regimen to see if it is safe and effective for you

## 2019-01-21 NOTE — PROGRESS NOTES
Progress Note - Behavioral Health   Jorje Saunders 33 y o  female MRN: [id number]  Unit/Bed#: [facility] 607-01 Encounter: [id number]        I came to see the patient for continuation of care, patient states that she took the clonazepam after she have some type of argument with her boyfriend, she took the pills and after she took it she told him that bring it to the emergency room  She states that she was not suicidal, this was something that she did in the moment  She regretted her action  She also states that she tried to elope from the hospital because she was told that she was medically cleared to go and she was scared that she was not able to get the dose of methadone for Sunday and Monday  Apparently what happened is when she tried to leave the boyfriend was scared that she can be in danger looking for him because he was no in the floor when she tried to leave and was misunderstood that she was suicidal   I have a long conversation with the patient her boyfriend, both agree that she needs therapy, she states that the pregnancy had been stressful but both of them want the baby  She states that she never had any suicide attempt  She had been is seen a psychiatrist for some time  She goes to methadone clinic and had been able to be clean for any other drugs  She states that she was not taking the clonazepam every day and at this point she does not needed  She denies any history of manic episode, she denies any history of psychotic symptoms          Behavior over the last 24 hours:  improved  Sleep: normal  Appetite: normal  Medication side effects: No  ROS: no complaints    Mental Status Evaluation:  Appearance:  age appropriate and casually dressed   Behavior:  normal   Speech:  normal pitch and normal volume   Mood:  euthymic   Affect:  mood-congruent   Language: naming objects and repeating phrases   Thought Process:  goal directed   Associations: intact associations   Thought Content:  normal   Perceptual Disturbances: None   Risk Potential: She denies any suicidal thoughts plans or intent, and regretted her actions   Sensorium:  person, place, time/date, situation, day of week and month of year   Memory:  recent and remote memory grossly intact   Cognition:  grossly intact   Consciousness:  alert and awake    Attention: attention span and concentration were age appropriate   Intellect: within normal limits   Fund of Knowledge: awareness of current events: Good, past history: Good and vocabulary: Good   Insight:  fair   Judgment: fair   Muscle Strength and Tone: Within normal limits   Gait/Station: normal gait/station and normal balance   Motor Activity: no abnormal movements         Assessment/Plan  Pauline Sandoval is a 33 y o  female who was brought into the hospital after she took several tablets of clonazepam, because she was very upset with her boyfriend, she regretted her action immediately and  she told her boyfriend to bring it to the hospital   She denies any history of suicide attempt, denies any psychotic symptoms or manic episodes  She denies any active suicidal thoughts plan or intent, she regretted her actions because she want to have her baby  She feels that her boyfriend is supportive  Both agree to go for therapy  Diagnosis: Anxiety disorder unspecified  Adult attention deficit disorder    Recommended Treatment:   Continue medical management  Discontinue one-to-one observation  Outpatient therapy, phone number given for comp therapy, this is for 4 section free from the insurance and states that establish outpatient therapy     Continue with her psychiatrist Dr Roger Lees in outpatient  [de-identified] phone number for partial hospitalization given [phone] in case that she need more services she can contact us  Crisis phone number given  Suicidal hotline phone number given  I also talked to the patient and teach some relaxation techniques  This case has been discussed with primary team  Patient can go home when medically clear    Medications:   current meds:   Current Facility-Administered Medications   Medication Dose Route Frequency    enoxaparin (LOVENOX) subcutaneous injection 40 mg  40 mg Subcutaneous Daily    hydrOXYzine HCL (ATARAX) tablet 25 mg  25 mg Oral Q6H PRN    melatonin tablet 6 mg  6 mg Oral HS PRN    methadone (DOLOPHINE) 10 mg/mL oral concentrated solution 85 mg  85 mg Oral Daily    nicotine (NICODERM CQ) 7 mg/24hr TD 24 hr patch 7 mg  7 mg Transdermal Daily    prenatal multivitamin tablet 1 tablet  1 tablet Oral Daily         Risks, benefits and possible side effects of Medications:     Risks, benefits, and possible side effects of medications explained to patient and patient verbalizes understanding  Risks of medications in pregnancy explained if female patient  Patient verbalizes understanding and agrees to notify her doctor if she becomes pregnant  Labs: I have personally reviewed all pertinent laboratory results           Maura Adames MD

## 2019-01-21 NOTE — SOCIAL WORK
Cm reviewed patient during care coordination rounds  Cm informed patient signed 12 for psych treatment  Cm contacted intake workers at [address] [place] [address] [address] to obtain inpatient psych bed  Per  at [facility] would not have Ob/gyn care at their facility  Cm also requested to obtain and other psych consult for official in patient psych determination  Cm informed after completion of Psych consult, patient is clear to discharge home  Cm added followup providers of [facility] to follow up orders for patient  CM reviewed d/c planning process including the following: identifying help at home, patient preference for d/c planning needs, Discharge Lounge, [facility] Meds to Bed program, availability of treatment team to discuss questions or concerns patient and/or family may have regarding understanding medications and recognizing signs and symptoms once discharged  CM also encouraged patient to follow up with all recommended appointments after discharge  Patient advised of importance for patient and family to participate in managing patients medical well being

## 2019-01-22 ENCOUNTER — TELEPHONE (OUTPATIENT)
Dept: BEHAVIORAL/MENTAL HEALTH CLINIC | Facility: CLINIC | Age: 29
End: 2019-01-22

## 2019-01-22 LAB
ATRIAL RATE: 79 BPM
P AXIS: 33 DEGREES
PR INTERVAL: 128 MS
QRS AXIS: 48 DEGREES
QRSD INTERVAL: 84 MS
QT INTERVAL: 370 MS
QTC INTERVAL: 424 MS
T WAVE AXIS: 7 DEGREES
VENTRICULAR RATE: 79 BPM

## 2019-01-22 PROCEDURE — 93010 ELECTROCARDIOGRAM REPORT: CPT | Performed by: INTERNAL MEDICINE

## 2019-01-22 NOTE — TELEPHONE ENCOUNTER
Patient was in the hospital over the weekend  She was cleared by Dr Yue Owens yesterday  "The weekend psych doctor determined differently " She is asking for a letter to return to work since you are the one that originally gave her the ok to be discharged

## 2019-01-29 ENCOUNTER — APPOINTMENT (OUTPATIENT)
Dept: LAB | Facility: HOSPITAL | Age: 29
End: 2019-01-29
Attending: OBSTETRICS & GYNECOLOGY
Payer: COMMERCIAL

## 2019-01-29 DIAGNOSIS — O99.322 DRUG USE AFFECTING PREGNANCY IN SECOND TRIMESTER: ICD-10-CM

## 2019-01-29 LAB
AMPHETAMINES SERPL QL SCN: POSITIVE
BARBITURATES UR QL: NEGATIVE
BENZODIAZ UR QL: POSITIVE
COCAINE UR QL: NEGATIVE
GLUCOSE 1H P 50 G GLC PO SERPL-MCNC: 92 MG/DL
METHADONE UR QL: POSITIVE
OPIATES UR QL SCN: NEGATIVE
PCP UR QL: NEGATIVE
THC UR QL: NEGATIVE

## 2019-01-29 PROCEDURE — 80307 DRUG TEST PRSMV CHEM ANLYZR: CPT

## 2019-01-29 PROCEDURE — 86803 HEPATITIS C AB TEST: CPT

## 2019-01-29 PROCEDURE — 36415 COLL VENOUS BLD VENIPUNCTURE: CPT | Performed by: OBSTETRICS & GYNECOLOGY

## 2019-01-29 PROCEDURE — 82950 GLUCOSE TEST: CPT | Performed by: OBSTETRICS & GYNECOLOGY

## 2019-01-29 PROCEDURE — 86592 SYPHILIS TEST NON-TREP QUAL: CPT | Performed by: OBSTETRICS & GYNECOLOGY

## 2019-01-30 LAB
HCV AB SER QL: NORMAL
RPR SER QL: NORMAL

## 2019-01-31 ENCOUNTER — ULTRASOUND (OUTPATIENT)
Dept: PERINATAL CARE | Facility: MEDICAL CENTER | Age: 29
End: 2019-01-31
Payer: COMMERCIAL

## 2019-01-31 VITALS
HEIGHT: 64 IN | WEIGHT: 184 LBS | HEART RATE: 93 BPM | BODY MASS INDEX: 31.41 KG/M2 | DIASTOLIC BLOOD PRESSURE: 82 MMHG | SYSTOLIC BLOOD PRESSURE: 125 MMHG

## 2019-01-31 DIAGNOSIS — O36.5930 POOR FETAL GROWTH AFFECTING MANAGEMENT OF MOTHER IN THIRD TRIMESTER, SINGLE OR UNSPECIFIED FETUS: ICD-10-CM

## 2019-01-31 DIAGNOSIS — O35.0XX0 PREGNANCY COMPLICATED BY FETAL CEREBRAL VENTRICULOMEGALY, NOT APPLICABLE OR UNSPECIFIED FETUS: Primary | ICD-10-CM

## 2019-01-31 DIAGNOSIS — Z3A.29 29 WEEKS GESTATION OF PREGNANCY: ICD-10-CM

## 2019-01-31 PROCEDURE — 76820 UMBILICAL ARTERY ECHO: CPT | Performed by: OBSTETRICS & GYNECOLOGY

## 2019-01-31 PROCEDURE — 76816 OB US FOLLOW-UP PER FETUS: CPT | Performed by: OBSTETRICS & GYNECOLOGY

## 2019-01-31 PROCEDURE — 76821 MIDDLE CEREBRAL ARTERY ECHO: CPT | Performed by: OBSTETRICS & GYNECOLOGY

## 2019-01-31 PROCEDURE — 99213 OFFICE O/P EST LOW 20 MIN: CPT | Performed by: OBSTETRICS & GYNECOLOGY

## 2019-01-31 NOTE — PATIENT INSTRUCTIONS
Kick Counts in Pregnancy   AMBULATORY CARE:   Kick counts  measure how much your baby is moving in your womb  A kick from your baby can be felt as a twist, turn, swish, roll, or jab  It is common to feel your baby kicking at 26 to 28 weeks of pregnancy  You may feel your baby kick as early as 20 weeks of pregnancy  Seek care immediately if:   · You feel your baby kick less as the day goes on      · You do not feel any kicks in a day  Contact your healthcare provider if:   · You feel a change in the number of kicks or movements of your baby  · You feel fewer than 10 kicks within 2 hours after counting  · You have questions or concerns about your baby's movements  Why measure kick counts:  Your baby's movement may provide information about your baby's health  He may move less, or not at all, if there are problems  He may move less if he does not have enough room to grow in your uterus (womb)  He may also move less if he is not getting enough oxygen or nutrition from the placenta  Tell your healthcare provider as soon as you feel a change in your baby's movements  Problems that are found earlier are easier to treat  When to measure kick counts:   · Measure kick counts at the same time every day  · Measure kick counts when your baby is awake and most active  Your baby may be most active in the evening  · Measure kick counts after a meal or snack or when your baby is usually most active  Your baby may be more active after you eat or in the evening  · You should not smoke while you are pregnant  Smoking increases the risk of health problems for you and for your baby during your pregnancy  If you do smoke, wait 2 hours to measure kick counts  Nicotine can make your baby more active than usual   How to measure kick counts:  Check that your baby is awake before you measure kick counts  You can wake up your baby by lightly pushing on your belly, walking, or drinking something cold   Your healthcare provider may tell you different ways to measure kick counts  He/She may tell you to do the following:  · Use a chart or clock to keep track of the time you start and finish counting  · Sit in a chair or lie on your left side  · Place your hands on the largest part of your belly  · Count until you reach 10 kicks  Write down how much time it takes to count 10 kicks  · It may take 30 minutes to 2 hours to count 10 kicks  It should not take more than 2 hours to count 10 kicks  If you do not feel 10 kicks within 2 hours, Call your Obstetrician    Follow up with your healthcare provider as directed:  Write down your questions so you remember to ask them during your visits  © 2017 [address] [employer] Information is for End User's use only and may not be sold, redistributed or otherwise used for commercial purposes  All illustrations and images included in CareNotes® are the copyrighted property of A D A M , Inc  or Rafa Garcia  The above information is an  only  It is not intended as medical advice for individual conditions or treatments  Talk to your doctor, nurse or pharmacist before following any medical regimen to see if it is safe and effective for you

## 2019-01-31 NOTE — PROGRESS NOTES
The patient was seen today for an ultrasound  Please see ultrasound report (located under Ob Procedures) for additional details  Thank you very much for allowing us to participate in the care of this very nice patient  Should you have any questions, please do not hesitate to contact me  Dante Haile MD [address] [facility]  Attending Physician, Gerard

## 2019-02-04 ENCOUNTER — ROUTINE PRENATAL (OUTPATIENT)
Dept: OBGYN CLINIC | Facility: CLINIC | Age: 29
End: 2019-02-04
Payer: COMMERCIAL

## 2019-02-04 VITALS — WEIGHT: 181.8 LBS | SYSTOLIC BLOOD PRESSURE: 110 MMHG | BODY MASS INDEX: 31.21 KG/M2 | DIASTOLIC BLOOD PRESSURE: 78 MMHG

## 2019-02-04 DIAGNOSIS — Z34.03 ENCOUNTER FOR SUPERVISION OF NORMAL FIRST PREGNANCY IN THIRD TRIMESTER: Primary | ICD-10-CM

## 2019-02-04 DIAGNOSIS — Z23 NEED FOR TDAP VACCINATION: ICD-10-CM

## 2019-02-04 DIAGNOSIS — O99.323 METHADONE MAINTENANCE TREATMENT AFFECTING PREGNANCY IN THIRD TRIMESTER (HCC): ICD-10-CM

## 2019-02-04 DIAGNOSIS — F11.20 METHADONE MAINTENANCE TREATMENT AFFECTING PREGNANCY IN THIRD TRIMESTER (HCC): ICD-10-CM

## 2019-02-04 DIAGNOSIS — O35.0XX0 PREGNANCY COMPLICATED BY FETAL CEREBRAL VENTRICULOMEGALY, NOT APPLICABLE OR UNSPECIFIED FETUS: ICD-10-CM

## 2019-02-04 DIAGNOSIS — Z3A.29 29 WEEKS GESTATION OF PREGNANCY: ICD-10-CM

## 2019-02-04 PROCEDURE — 90715 TDAP VACCINE 7 YRS/> IM: CPT | Performed by: OBSTETRICS & GYNECOLOGY

## 2019-02-04 PROCEDURE — 90472 IMMUNIZATION ADMIN EACH ADD: CPT | Performed by: OBSTETRICS & GYNECOLOGY

## 2019-02-04 PROCEDURE — 90471 IMMUNIZATION ADMIN: CPT | Performed by: OBSTETRICS & GYNECOLOGY

## 2019-02-04 PROCEDURE — PNV: Performed by: OBSTETRICS & GYNECOLOGY

## 2019-02-04 NOTE — LETTER
February 4, 2019     Patient: Sarah Roe   YOB: 1990   Date of Visit: 2/4/2019       To Whom it May Concern:    Molly Steven is under my professional care  She was seen in my office on 2/4/2019  She may return to work on 02/04/2019  If you have any questions or concerns, please don't hesitate to call           Sincerely,          Phong Smith DO        CC: No Recipients

## 2019-02-04 NOTE — PROGRESS NOTES
33 y o    female at 34w10d EGA for PNV  BP : 110/78  [id number]  Had f/u growth/brain imaging this past Friday - no report at time of this visit  Patient reports there was good growth  F/U in one month  Has nicu consult scheduled tomorrow  Not smoking anymore, not using the patches anymore  Feels good  Good FM  No contractions or bleeding  28 wk folder reviewed  Patient thinks she might want to breastfeed but unsure  TDAP given today  Consent signed, full code, ok with transfusion

## 2019-02-05 ENCOUNTER — DOCUMENTATION (OUTPATIENT)
Dept: OTHER | Facility: HOSPITAL | Age: 29
End: 2019-02-05

## 2019-02-07 ENCOUNTER — APPOINTMENT (OUTPATIENT)
Dept: LAB | Facility: HOSPITAL | Age: 29
End: 2019-02-07
Attending: OBSTETRICS & GYNECOLOGY
Payer: COMMERCIAL

## 2019-02-07 PROBLEM — O36.5930 POOR FETAL GROWTH AFFECTING MANAGEMENT OF MOTHER IN THIRD TRIMESTER: Status: ACTIVE | Noted: 2019-01-04

## 2019-02-07 PROBLEM — Z3A.29 29 WEEKS GESTATION OF PREGNANCY: Status: ACTIVE | Noted: 2018-12-06

## 2019-02-07 PROBLEM — O99.323 METHADONE MAINTENANCE TREATMENT AFFECTING PREGNANCY IN THIRD TRIMESTER (HCC): Status: ACTIVE | Noted: 2019-01-04

## 2019-02-07 LAB
BASOPHILS # BLD AUTO: 0.04 THOUSANDS/ΜL (ref 0–0.1)
BASOPHILS NFR BLD AUTO: 0 % (ref 0–1)
EOSINOPHIL # BLD AUTO: 0 THOUSAND/ΜL (ref 0–0.61)
EOSINOPHIL NFR BLD AUTO: 0 % (ref 0–6)
ERYTHROCYTE [DISTWIDTH] IN BLOOD BY AUTOMATED COUNT: 13.2 % (ref 11.6–15.1)
HCT VFR BLD AUTO: 35.9 % (ref 34.8–46.1)
HGB BLD-MCNC: 11.9 G/DL (ref 11.5–15.4)
IMM GRANULOCYTES # BLD AUTO: 0.03 THOUSAND/UL (ref 0–0.2)
IMM GRANULOCYTES NFR BLD AUTO: 0 % (ref 0–2)
LYMPHOCYTES # BLD AUTO: 2.12 THOUSANDS/ΜL (ref 0.6–4.47)
LYMPHOCYTES NFR BLD AUTO: 22 % (ref 14–44)
MCH RBC QN AUTO: 30.4 PG (ref 26.8–34.3)
MCHC RBC AUTO-ENTMCNC: 33.1 G/DL (ref 31.4–37.4)
MCV RBC AUTO: 92 FL (ref 82–98)
MONOCYTES # BLD AUTO: 0.6 THOUSAND/ΜL (ref 0.17–1.22)
MONOCYTES NFR BLD AUTO: 6 % (ref 4–12)
NEUTROPHILS # BLD AUTO: 6.78 THOUSANDS/ΜL (ref 1.85–7.62)
NEUTS SEG NFR BLD AUTO: 72 % (ref 43–75)
NRBC BLD AUTO-RTO: 0 /100 WBCS
PLATELET # BLD AUTO: 291 THOUSANDS/UL (ref 149–390)
PMV BLD AUTO: 11 FL (ref 8.9–12.7)
RBC # BLD AUTO: 3.92 MILLION/UL (ref 3.81–5.12)
WBC # BLD AUTO: 9.57 THOUSAND/UL (ref 4.31–10.16)

## 2019-02-07 PROCEDURE — 36415 COLL VENOUS BLD VENIPUNCTURE: CPT | Performed by: OBSTETRICS & GYNECOLOGY

## 2019-02-07 PROCEDURE — 85025 COMPLETE CBC W/AUTO DIFF WBC: CPT | Performed by: OBSTETRICS & GYNECOLOGY

## 2019-02-11 ENCOUNTER — TELEPHONE (OUTPATIENT)
Dept: OBGYN CLINIC | Facility: CLINIC | Age: 29
End: 2019-02-11

## 2019-02-11 NOTE — TELEPHONE ENCOUNTER
30w5d OB calling about options for a paternity test during pregnancy  LM on voicemail for patient to call the office

## 2019-02-19 ENCOUNTER — ROUTINE PRENATAL (OUTPATIENT)
Dept: OBGYN CLINIC | Facility: CLINIC | Age: 29
End: 2019-02-19

## 2019-02-19 VITALS — BODY MASS INDEX: 32.3 KG/M2 | SYSTOLIC BLOOD PRESSURE: 118 MMHG | DIASTOLIC BLOOD PRESSURE: 76 MMHG | WEIGHT: 188.2 LBS

## 2019-02-19 DIAGNOSIS — Z3A.31 31 WEEKS GESTATION OF PREGNANCY: ICD-10-CM

## 2019-02-19 DIAGNOSIS — O35.0XX0 PREGNANCY COMPLICATED BY FETAL CEREBRAL VENTRICULOMEGALY, NOT APPLICABLE OR UNSPECIFIED FETUS: ICD-10-CM

## 2019-02-19 DIAGNOSIS — O99.323 METHADONE MAINTENANCE TREATMENT AFFECTING PREGNANCY IN THIRD TRIMESTER (HCC): Primary | ICD-10-CM

## 2019-02-19 DIAGNOSIS — F11.20 METHADONE MAINTENANCE TREATMENT AFFECTING PREGNANCY IN THIRD TRIMESTER (HCC): Primary | ICD-10-CM

## 2019-02-19 PROCEDURE — PNV: Performed by: OBSTETRICS & GYNECOLOGY

## 2019-02-20 PROBLEM — Z3A.31 31 WEEKS GESTATION OF PREGNANCY: Status: ACTIVE | Noted: 2018-12-06

## 2019-02-20 NOTE — PROGRESS NOTES
33 y o    female at 27 5 [address] 157 for PNV  BP : 118/76  TW  Feeling well  Reports ok mood    Continues to see psychiatrist   Currently with BF, but would like to do couples counseling  Methadone 85mg - declines withdrawal symptoms, had NICU consult, plans to breast feed  Has stopped her Klonipin  Quit smoking  Reviewed PTL precautions and FKCs  PNC growth next week  Follow up 2 weeks

## 2019-03-01 ENCOUNTER — ULTRASOUND (OUTPATIENT)
Dept: PERINATAL CARE | Facility: MEDICAL CENTER | Age: 29
End: 2019-03-01
Payer: COMMERCIAL

## 2019-03-01 VITALS
BODY MASS INDEX: 32.44 KG/M2 | DIASTOLIC BLOOD PRESSURE: 84 MMHG | SYSTOLIC BLOOD PRESSURE: 120 MMHG | HEART RATE: 105 BPM | WEIGHT: 190 LBS | HEIGHT: 64 IN

## 2019-03-01 DIAGNOSIS — O99.323 METHADONE MAINTENANCE TREATMENT AFFECTING PREGNANCY IN THIRD TRIMESTER (HCC): ICD-10-CM

## 2019-03-01 DIAGNOSIS — O35.0XX0 PREGNANCY COMPLICATED BY FETAL CEREBRAL VENTRICULOMEGALY, NOT APPLICABLE OR UNSPECIFIED FETUS: ICD-10-CM

## 2019-03-01 DIAGNOSIS — Z3A.33 33 WEEKS GESTATION OF PREGNANCY: ICD-10-CM

## 2019-03-01 DIAGNOSIS — F11.20 METHADONE MAINTENANCE TREATMENT AFFECTING PREGNANCY IN THIRD TRIMESTER (HCC): ICD-10-CM

## 2019-03-01 DIAGNOSIS — O36.5930 POOR FETAL GROWTH AFFECTING MANAGEMENT OF MOTHER IN THIRD TRIMESTER, SINGLE OR UNSPECIFIED FETUS: Primary | ICD-10-CM

## 2019-03-01 DIAGNOSIS — O35.9XX0 TERATOGEN EXPOSURE IN CURRENT PREGNANCY, SINGLE OR UNSPECIFIED FETUS: ICD-10-CM

## 2019-03-01 PROCEDURE — 76820 UMBILICAL ARTERY ECHO: CPT | Performed by: OBSTETRICS & GYNECOLOGY

## 2019-03-01 PROCEDURE — 76816 OB US FOLLOW-UP PER FETUS: CPT | Performed by: OBSTETRICS & GYNECOLOGY

## 2019-03-01 PROCEDURE — 76821 MIDDLE CEREBRAL ARTERY ECHO: CPT | Performed by: OBSTETRICS & GYNECOLOGY

## 2019-03-01 PROCEDURE — 99213 OFFICE O/P EST LOW 20 MIN: CPT | Performed by: OBSTETRICS & GYNECOLOGY

## 2019-03-01 PROCEDURE — 76818 FETAL BIOPHYS PROFILE W/NST: CPT | Performed by: OBSTETRICS & GYNECOLOGY

## 2019-03-01 NOTE — LETTER
NST sleeve cover sheet    Patient name: Shayla Varela  : 1990  MRN: [id number]    ARCELIA: Estimated Date of Delivery: 19    Obstetrician: __[facility]_____________________________    Reason(s) for testing:  ____________________IUGR______________________      Testing frequency:    _x__ 2x/wk  ___ 1x/wk  __x_ Dopplers  ___ BPP?       Last growth scan: ________3/1/2019__________________________________

## 2019-03-01 NOTE — PROGRESS NOTES
NST procedure and expected outcome explained to patient  Daily fetal kick count discussed with handout given  Patient verbalized understanding of all and was receptive      Bernardo Bella RN

## 2019-03-04 ENCOUNTER — ROUTINE PRENATAL (OUTPATIENT)
Dept: OBGYN CLINIC | Facility: CLINIC | Age: 29
End: 2019-03-04

## 2019-03-04 VITALS — BODY MASS INDEX: 32.61 KG/M2 | DIASTOLIC BLOOD PRESSURE: 72 MMHG | SYSTOLIC BLOOD PRESSURE: 116 MMHG | WEIGHT: 190 LBS

## 2019-03-04 DIAGNOSIS — F11.20 METHADONE MAINTENANCE TREATMENT AFFECTING PREGNANCY IN THIRD TRIMESTER (HCC): ICD-10-CM

## 2019-03-04 DIAGNOSIS — O99.323 METHADONE MAINTENANCE TREATMENT AFFECTING PREGNANCY IN THIRD TRIMESTER (HCC): ICD-10-CM

## 2019-03-04 DIAGNOSIS — O35.0XX0 PREGNANCY COMPLICATED BY FETAL CEREBRAL VENTRICULOMEGALY, NOT APPLICABLE OR UNSPECIFIED FETUS: ICD-10-CM

## 2019-03-04 DIAGNOSIS — O36.5930 POOR FETAL GROWTH AFFECTING MANAGEMENT OF MOTHER IN THIRD TRIMESTER, SINGLE OR UNSPECIFIED FETUS: Primary | ICD-10-CM

## 2019-03-04 DIAGNOSIS — Z3A.33 33 WEEKS GESTATION OF PREGNANCY: ICD-10-CM

## 2019-03-04 PROCEDURE — PNV: Performed by: OBSTETRICS & GYNECOLOGY

## 2019-03-04 NOTE — PROGRESS NOTES
Pt does have some skin tags on her belly that she wants to make sure they are normal    Pt feels good, no complaints, just tired

## 2019-03-04 NOTE — PROGRESS NOTES
33 y o    female at 30 10 wga EGA for PNV  BP : 116/72  TW  Feeling well  Reassured pt that skin tags are ok and caused by hormonal changes and friction  Continues Methadone 85mg daily    Growth restriction and ventriularomegaly - starting 2x/wk NST, weekly OMKAR and dopplers  Pt to get Toxo testing  Plan for delivery at 39 weeks    Reviewed PTL precautions and FKCs

## 2019-03-05 ENCOUNTER — ROUTINE PRENATAL (OUTPATIENT)
Dept: PERINATAL CARE | Facility: CLINIC | Age: 29
End: 2019-03-05
Payer: COMMERCIAL

## 2019-03-05 VITALS
DIASTOLIC BLOOD PRESSURE: 88 MMHG | HEART RATE: 73 BPM | BODY MASS INDEX: 32.88 KG/M2 | SYSTOLIC BLOOD PRESSURE: 117 MMHG | HEIGHT: 64 IN | WEIGHT: 192.6 LBS

## 2019-03-05 DIAGNOSIS — F11.20 METHADONE MAINTENANCE TREATMENT AFFECTING PREGNANCY IN THIRD TRIMESTER (HCC): ICD-10-CM

## 2019-03-05 DIAGNOSIS — O99.323 METHADONE MAINTENANCE TREATMENT AFFECTING PREGNANCY IN THIRD TRIMESTER (HCC): ICD-10-CM

## 2019-03-05 DIAGNOSIS — Z3A.34 34 WEEKS GESTATION OF PREGNANCY: ICD-10-CM

## 2019-03-05 DIAGNOSIS — O35.0XX0 PREGNANCY COMPLICATED BY FETAL CEREBRAL VENTRICULOMEGALY, NOT APPLICABLE OR UNSPECIFIED FETUS: ICD-10-CM

## 2019-03-05 DIAGNOSIS — O36.5930 POOR FETAL GROWTH AFFECTING MANAGEMENT OF MOTHER IN THIRD TRIMESTER, SINGLE OR UNSPECIFIED FETUS: Primary | ICD-10-CM

## 2019-03-05 PROCEDURE — 59025 FETAL NON-STRESS TEST: CPT | Performed by: OBSTETRICS & GYNECOLOGY

## 2019-03-05 NOTE — LETTER
NST sleeve cover sheet    Patient name: Annabella Rosario  : 1990  MRN: [id number]    ARCELIA: Estimated Date of Delivery: 19    Obstetrician: ______[facility]_________________________    Reason(s) for testing:  _______________IUGR, Fetal Anomaly___________________________      Testing frequency:    _x__ 2x/wk  ___ 1x/wk  ___ Dopplers  ___ BPP?       Last growth scan: __________________________________________

## 2019-03-06 PROBLEM — Z3A.34 34 WEEKS GESTATION OF PREGNANCY: Status: ACTIVE | Noted: 2018-12-06

## 2019-03-08 ENCOUNTER — HOSPITAL ENCOUNTER (OUTPATIENT)
Facility: HOSPITAL | Age: 29
Discharge: HOME | End: 2019-03-08
Attending: OBSTETRICS & GYNECOLOGY | Admitting: OBSTETRICS & GYNECOLOGY
Payer: COMMERCIAL

## 2019-03-08 ENCOUNTER — OFFICE VISIT (OUTPATIENT)
Dept: PERINATAL CARE | Facility: CLINIC | Age: 29
End: 2019-03-08
Payer: COMMERCIAL

## 2019-03-08 VITALS
HEART RATE: 80 BPM | RESPIRATION RATE: 18 BRPM | SYSTOLIC BLOOD PRESSURE: 122 MMHG | WEIGHT: 192 LBS | TEMPERATURE: 97.7 F | OXYGEN SATURATION: 99 % | BODY MASS INDEX: 32.78 KG/M2 | DIASTOLIC BLOOD PRESSURE: 79 MMHG | HEIGHT: 64 IN

## 2019-03-08 VITALS
DIASTOLIC BLOOD PRESSURE: 82 MMHG | HEIGHT: 64 IN | SYSTOLIC BLOOD PRESSURE: 117 MMHG | BODY MASS INDEX: 33.02 KG/M2 | WEIGHT: 193.4 LBS

## 2019-03-08 DIAGNOSIS — O35.0XX0 PREGNANCY COMPLICATED BY FETAL CEREBRAL VENTRICULOMEGALY, NOT APPLICABLE OR UNSPECIFIED FETUS: ICD-10-CM

## 2019-03-08 DIAGNOSIS — O99.323 METHADONE MAINTENANCE TREATMENT AFFECTING PREGNANCY IN THIRD TRIMESTER (HCC): ICD-10-CM

## 2019-03-08 DIAGNOSIS — F11.20 METHADONE MAINTENANCE TREATMENT AFFECTING PREGNANCY IN THIRD TRIMESTER (HCC): ICD-10-CM

## 2019-03-08 DIAGNOSIS — Z3A.34 34 WEEKS GESTATION OF PREGNANCY: ICD-10-CM

## 2019-03-08 DIAGNOSIS — O36.5930 POOR FETAL GROWTH AFFECTING MANAGEMENT OF MOTHER IN THIRD TRIMESTER, SINGLE OR UNSPECIFIED FETUS: ICD-10-CM

## 2019-03-08 PROCEDURE — 76820 UMBILICAL ARTERY ECHO: CPT

## 2019-03-08 PROCEDURE — 76815 OB US LIMITED FETUS(S): CPT | Performed by: OBSTETRICS & GYNECOLOGY

## 2019-03-08 PROCEDURE — 76821 MIDDLE CEREBRAL ARTERY ECHO: CPT

## 2019-03-08 PROCEDURE — 76821 MIDDLE CEREBRAL ARTERY ECHO: CPT | Performed by: OBSTETRICS & GYNECOLOGY

## 2019-03-08 PROCEDURE — 99213 OFFICE O/P EST LOW 20 MIN: CPT

## 2019-03-08 PROCEDURE — 96372 THER/PROPH/DIAG INJ SC/IM: CPT

## 2019-03-08 PROCEDURE — 76820 UMBILICAL ARTERY ECHO: CPT | Performed by: OBSTETRICS & GYNECOLOGY

## 2019-03-08 PROCEDURE — 59025 FETAL NON-STRESS TEST: CPT | Performed by: OBSTETRICS & GYNECOLOGY

## 2019-03-08 PROCEDURE — 76815 OB US LIMITED FETUS(S): CPT

## 2019-03-08 PROCEDURE — 59025 FETAL NON-STRESS TEST: CPT

## 2019-03-08 RX ORDER — BETAMETHASONE SODIUM PHOSPHATE AND BETAMETHASONE ACETATE 3; 3 MG/ML; MG/ML
12 INJECTION, SUSPENSION INTRA-ARTICULAR; INTRALESIONAL; INTRAMUSCULAR; SOFT TISSUE ONCE
Status: DISCONTINUED | OUTPATIENT
Start: 2019-03-08 | End: 2019-03-08 | Stop reason: HOSPADM

## 2019-03-08 RX ORDER — BETAMETHASONE SODIUM PHOSPHATE AND BETAMETHASONE ACETATE 3; 3 MG/ML; MG/ML
12 INJECTION, SUSPENSION INTRA-ARTICULAR; INTRALESIONAL; INTRAMUSCULAR; SOFT TISSUE ONCE
Status: DISCONTINUED | OUTPATIENT
Start: 2019-03-08 | End: 2019-03-08

## 2019-03-08 NOTE — PROGRESS NOTES
Ms Diony Jacobo presents at 29 2/7 weeks gestation due to fetal growth restriction diagnosed one week ago, fetus at < 5% for GA,  and is now at 34 weeks gestation  Methadone 75 mg daily  Fetal ventriculomegaly  Reports good fetal movements  1  Live fetus in vertex presentation  2  Nonreactive NST with areas of spontaneous elevation in baseline and almost no variability  3  Normal umbilical cord and MCA Doppler  4  BPP: 8/8      Recommendations:  1  To labor and delivery for extended fetal monitorng and evaluation  2  Consider a course of corticosteroids for fetal lung maturity      Mychart, Generic

## 2019-03-08 NOTE — PROGRESS NOTES
OB Triage Note  Kelli Gutierrez 33 y o  female MRN: [id number]  Unit/Bed#: LD Triage 1     ARCELIA: Estimated Date of Delivery: 19    HPI: 33 y o   at 34w2d sent for stat try from  Center given nonreactive NST with rest of BPP normal with a score of 8/8  Nathanael Manifold She  denies having uterine contractions, no LOF, and no VB  She states she has felt good FM  OB Cx:   medication exposure   failed medical    fetal ventriculomegaly    FHT:135 bpm, reactive  Mod variability, no decelerations      TOCO: none       Vitals:   Temp:  [97 7 °F (36 5 °C)] 97 7 °F (36 5 °C)  HR:  [80] 80  Resp:  [18] 18  BP: (117-122)/(79-82) 122/79  SpO2:  [99 %] 99 %    Physical Exam:  General: AAOX3  No acute distress   Abdominal: Soft  NT/ND  Gravid      SVE:deferred       Ultrasound: see fetal testing note  ~_~_~_~_~_~_~_~_~_~_~_~_~_~_~_~_~_~_~_~_~_~_~_~_~_~_~_~_~_~_~_~_~_~_~    A/P:  at 34w2d here for extended monitoring   After evaluation, reactive NST  Fetal testing reassuring  · BTM today, to return tomorrow at same time for second dose  · Precautions reviewed with patient including decreased fetal movement, vaginal bleeding, contractions, leakage of fluid  She has next appointment with her obstetrician on Monday, she will keep this appointment  Dr Cruz Prior aware  Signature/Title:  Piero Silver  Date: 3/8/2019  Time: 5:47 PM

## 2019-03-08 NOTE — LETTER
NST sleeve cover sheet    Patient name: Pily Grover  : 1990  MRN: [id number]    ARCELIA: Estimated Date of Delivery: 19    Obstetrician: _______________________________    Reason(s) for testing:  __________________________________________      Testing frequency:    ___ 2x/wk  ___ 1x/wk  ___ Dopplers  ___ BPP?       Last growth scan: __________________________________________

## 2019-03-09 ENCOUNTER — HOSPITAL ENCOUNTER (OUTPATIENT)
Facility: HOSPITAL | Age: 29
Discharge: HOME | End: 2019-03-09
Attending: OBSTETRICS & GYNECOLOGY | Admitting: OBSTETRICS & GYNECOLOGY
Payer: COMMERCIAL

## 2019-03-09 PROCEDURE — 99213 OFFICE O/P EST LOW 20 MIN: CPT

## 2019-03-09 PROCEDURE — 96372 THER/PROPH/DIAG INJ SC/IM: CPT

## 2019-03-09 RX ORDER — BETAMETHASONE SODIUM PHOSPHATE AND BETAMETHASONE ACETATE 3; 3 MG/ML; MG/ML
12 INJECTION, SUSPENSION INTRA-ARTICULAR; INTRALESIONAL; INTRAMUSCULAR; SOFT TISSUE ONCE
Status: COMPLETED | OUTPATIENT
Start: 2019-03-09 | End: 2019-03-09

## 2019-03-09 RX ADMIN — BETAMETHASONE ACETATE AND BETAMETHASONE SODIUM PHOSPHATE 12 MG: 3; 3 INJECTION, SUSPENSION INTRA-ARTICULAR; INTRALESIONAL; INTRAMUSCULAR; SOFT TISSUE at 17:28

## 2019-03-09 NOTE — PROGRESS NOTES
Patient in triage today for 2nd betamethasone dose  While here and torsed increased mucouslike discharge expelled in the toilet when she pees  She denies any leakage of fluid in between visits to the toilet  Denies any vaginal bleeding, endorses fetal movement, denies any contractions today  Was advised that the only way to know if she has ruptured is for speculum exam, nitrazine testing and microscopy  Patient declined speculum exam at this time  States that she does not feel that she is ruptured she does not have leaking in between visits to the toilet  Was provided  labor precautions and expressed understanding  Patient states that she will call the office for evaluation and she feels like anything changes are she has increased gushes of fluid  She has follow-up in the office on Monday      D/w Dr Mcguire Gist

## 2019-03-11 ENCOUNTER — ROUTINE PRENATAL (OUTPATIENT)
Dept: OBGYN CLINIC | Facility: CLINIC | Age: 29
End: 2019-03-11
Payer: COMMERCIAL

## 2019-03-11 VITALS — BODY MASS INDEX: 33.3 KG/M2 | WEIGHT: 194 LBS | SYSTOLIC BLOOD PRESSURE: 118 MMHG | DIASTOLIC BLOOD PRESSURE: 64 MMHG

## 2019-03-11 DIAGNOSIS — O36.5930 POOR FETAL GROWTH AFFECTING MANAGEMENT OF MOTHER IN THIRD TRIMESTER, SINGLE OR UNSPECIFIED FETUS: ICD-10-CM

## 2019-03-11 DIAGNOSIS — O99.323 METHADONE MAINTENANCE TREATMENT AFFECTING PREGNANCY IN THIRD TRIMESTER (HCC): ICD-10-CM

## 2019-03-11 DIAGNOSIS — O35.0XX0 PREGNANCY COMPLICATED BY FETAL CEREBRAL VENTRICULOMEGALY, NOT APPLICABLE OR UNSPECIFIED FETUS: ICD-10-CM

## 2019-03-11 DIAGNOSIS — F11.20 METHADONE MAINTENANCE TREATMENT AFFECTING PREGNANCY IN THIRD TRIMESTER (HCC): ICD-10-CM

## 2019-03-11 DIAGNOSIS — Z3A.34 34 WEEKS GESTATION OF PREGNANCY: ICD-10-CM

## 2019-03-11 DIAGNOSIS — Z34.03 ENCOUNTER FOR SUPERVISION OF NORMAL FIRST PREGNANCY IN THIRD TRIMESTER: Primary | ICD-10-CM

## 2019-03-11 PROCEDURE — PNV: Performed by: OBSTETRICS & GYNECOLOGY

## 2019-03-11 PROCEDURE — 59025 FETAL NON-STRESS TEST: CPT | Performed by: OBSTETRICS & GYNECOLOGY

## 2019-03-11 PROCEDURE — 87653 STREP B DNA AMP PROBE: CPT | Performed by: OBSTETRICS & GYNECOLOGY

## 2019-03-11 NOTE — PROGRESS NOTES
Pt states that she has mucus in her urine, wants to know if it could be her mucus plug  Pt does state that she is seeing spots in her vision

## 2019-03-14 ENCOUNTER — OFFICE VISIT (OUTPATIENT)
Dept: PERINATAL CARE | Facility: CLINIC | Age: 29
End: 2019-03-14
Payer: COMMERCIAL

## 2019-03-14 ENCOUNTER — HOSPITAL ENCOUNTER (OUTPATIENT)
Facility: HOSPITAL | Age: 29
Discharge: HOME | End: 2019-03-14
Attending: OBSTETRICS & GYNECOLOGY | Admitting: OBSTETRICS & GYNECOLOGY
Payer: COMMERCIAL

## 2019-03-14 VITALS
RESPIRATION RATE: 18 BRPM | DIASTOLIC BLOOD PRESSURE: 87 MMHG | HEART RATE: 74 BPM | TEMPERATURE: 97.9 F | SYSTOLIC BLOOD PRESSURE: 134 MMHG

## 2019-03-14 VITALS
SYSTOLIC BLOOD PRESSURE: 135 MMHG | RESPIRATION RATE: 18 BRPM | WEIGHT: 196 LBS | HEIGHT: 64 IN | DIASTOLIC BLOOD PRESSURE: 86 MMHG | BODY MASS INDEX: 33.46 KG/M2 | HEART RATE: 105 BPM

## 2019-03-14 DIAGNOSIS — O35.0XX0 PREGNANCY COMPLICATED BY FETAL CEREBRAL VENTRICULOMEGALY, NOT APPLICABLE OR UNSPECIFIED FETUS: ICD-10-CM

## 2019-03-14 DIAGNOSIS — Z3A.35 35 WEEKS GESTATION OF PREGNANCY: ICD-10-CM

## 2019-03-14 DIAGNOSIS — O99.323 METHADONE MAINTENANCE TREATMENT AFFECTING PREGNANCY IN THIRD TRIMESTER (HCC): ICD-10-CM

## 2019-03-14 DIAGNOSIS — O14.13 PREECLAMPSIA, SEVERE, THIRD TRIMESTER: Primary | ICD-10-CM

## 2019-03-14 DIAGNOSIS — F11.20 METHADONE MAINTENANCE TREATMENT AFFECTING PREGNANCY IN THIRD TRIMESTER (HCC): ICD-10-CM

## 2019-03-14 DIAGNOSIS — O36.5930 POOR FETAL GROWTH AFFECTING MANAGEMENT OF MOTHER IN THIRD TRIMESTER, SINGLE OR UNSPECIFIED FETUS: ICD-10-CM

## 2019-03-14 PROBLEM — Z22.330 GBS CARRIER: Status: ACTIVE | Noted: 2019-03-14

## 2019-03-14 LAB
ALBUMIN SERPL BCP-MCNC: 2.3 G/DL (ref 3.5–5)
ALP SERPL-CCNC: 128 U/L (ref 46–116)
ALT SERPL W P-5'-P-CCNC: 15 U/L (ref 12–78)
ANION GAP SERPL CALCULATED.3IONS-SCNC: 5 MMOL/L (ref 4–13)
AST SERPL W P-5'-P-CCNC: 14 U/L (ref 5–45)
BACTERIA UR QL AUTO: ABNORMAL /HPF
BILIRUB SERPL-MCNC: 0.12 MG/DL (ref 0.2–1)
BILIRUB UR QL STRIP: NEGATIVE
BUN SERPL-MCNC: 12 MG/DL (ref 5–25)
CALCIUM SERPL-MCNC: 8 MG/DL (ref 8.3–10.1)
CHLORIDE SERPL-SCNC: 110 MMOL/L (ref 100–108)
CLARITY UR: CLEAR
CO2 SERPL-SCNC: 22 MMOL/L (ref 21–32)
COLOR UR: YELLOW
CREAT SERPL-MCNC: 0.71 MG/DL (ref 0.6–1.3)
CREAT UR-MCNC: 245 MG/DL
ERYTHROCYTE [DISTWIDTH] IN BLOOD BY AUTOMATED COUNT: 14.8 % (ref 11.6–15.1)
GFR SERPL CREATININE-BSD FRML MDRD: 117 ML/MIN/1.73SQ M
GLUCOSE SERPL-MCNC: 92 MG/DL (ref 65–140)
GLUCOSE UR STRIP-MCNC: NEGATIVE MG/DL
GP B STREP DNA SPEC QL NAA+PROBE: ABNORMAL
HCT VFR BLD AUTO: 35.9 % (ref 34.8–46.1)
HGB BLD-MCNC: 11.2 G/DL (ref 11.5–15.4)
HGB UR QL STRIP.AUTO: NEGATIVE
HYALINE CASTS #/AREA URNS LPF: ABNORMAL /LPF
KETONES UR STRIP-MCNC: NEGATIVE MG/DL
LEUKOCYTE ESTERASE UR QL STRIP: NEGATIVE
MCH RBC QN AUTO: 29.9 PG (ref 26.8–34.3)
MCHC RBC AUTO-ENTMCNC: 31.2 G/DL (ref 31.4–37.4)
MCV RBC AUTO: 96 FL (ref 82–98)
NITRITE UR QL STRIP: NEGATIVE
NON-SQ EPI CELLS URNS QL MICRO: ABNORMAL /HPF
PH UR STRIP.AUTO: 6.5 [PH]
PLATELET # BLD AUTO: 228 THOUSANDS/UL (ref 149–390)
PMV BLD AUTO: 12.3 FL (ref 8.9–12.7)
POTASSIUM SERPL-SCNC: 4.1 MMOL/L (ref 3.5–5.3)
PROT SERPL-MCNC: 5.9 G/DL (ref 6.4–8.2)
PROT UR STRIP-MCNC: ABNORMAL MG/DL
PROT UR-MCNC: 67 MG/DL
PROT/CREAT UR: 0.27 MG/G{CREAT} (ref 0–0.1)
RBC # BLD AUTO: 3.75 MILLION/UL (ref 3.81–5.12)
RBC #/AREA URNS AUTO: ABNORMAL /HPF
SODIUM SERPL-SCNC: 137 MMOL/L (ref 136–145)
SP GR UR STRIP.AUTO: 1.03 (ref 1–1.03)
UROBILINOGEN UR QL STRIP.AUTO: 0.2 E.U./DL
WBC # BLD AUTO: 11.91 THOUSAND/UL (ref 4.31–10.16)
WBC #/AREA URNS AUTO: ABNORMAL /HPF

## 2019-03-14 PROCEDURE — 76815 OB US LIMITED FETUS(S): CPT | Performed by: OBSTETRICS & GYNECOLOGY

## 2019-03-14 PROCEDURE — 76821 MIDDLE CEREBRAL ARTERY ECHO: CPT | Performed by: OBSTETRICS & GYNECOLOGY

## 2019-03-14 PROCEDURE — 84156 ASSAY OF PROTEIN URINE: CPT | Performed by: OBSTETRICS & GYNECOLOGY

## 2019-03-14 PROCEDURE — 99212 OFFICE O/P EST SF 10 MIN: CPT | Performed by: OBSTETRICS & GYNECOLOGY

## 2019-03-14 PROCEDURE — 76820 UMBILICAL ARTERY ECHO: CPT | Performed by: OBSTETRICS & GYNECOLOGY

## 2019-03-14 PROCEDURE — 81001 URINALYSIS AUTO W/SCOPE: CPT | Performed by: OBSTETRICS & GYNECOLOGY

## 2019-03-14 PROCEDURE — 80053 COMPREHEN METABOLIC PANEL: CPT | Performed by: OBSTETRICS & GYNECOLOGY

## 2019-03-14 PROCEDURE — 99213 OFFICE O/P EST LOW 20 MIN: CPT | Performed by: OBSTETRICS & GYNECOLOGY

## 2019-03-14 PROCEDURE — 82570 ASSAY OF URINE CREATININE: CPT | Performed by: OBSTETRICS & GYNECOLOGY

## 2019-03-14 PROCEDURE — 99213 OFFICE O/P EST LOW 20 MIN: CPT

## 2019-03-14 PROCEDURE — 85027 COMPLETE CBC AUTOMATED: CPT | Performed by: OBSTETRICS & GYNECOLOGY

## 2019-03-14 PROCEDURE — 59025 FETAL NON-STRESS TEST: CPT | Performed by: OBSTETRICS & GYNECOLOGY

## 2019-03-14 NOTE — DISCHARGE INSTRUCTIONS
Pregnancy at 28 to [address]:   What changes are happening in my body? You are considered full term at the beginning of 37 weeks  Your breathing may be easier if your baby has moved down into a head-down position  You may need to urinate more often because the baby may be pressing on your bladder  You may also feel more discomfort and get tired easily  How do I care for myself at this stage of my pregnancy? · Eat a variety of healthy foods  Healthy foods include fruits, vegetables, whole-grain breads, low-fat dairy foods, beans, lean meats, and fish  Drink liquids as directed  Ask how much liquid to drink each day and which liquids are best for you  Limit caffeine to less than 200 milligrams each day  Limit your intake of fish to 2 servings each week  Choose fish low in mercury such as canned light tuna, shrimp, salmon, cod, or tilapia  Do not  eat fish high in mercury such as swordfish, tilefish, matthew mackerel, and shark  · Take prenatal vitamins as directed  Your need for certain vitamins and minerals, such as folic acid, increases during pregnancy  Prenatal vitamins provide some of the extra vitamins and minerals you need  Prenatal vitamins may also help to decrease the risk of certain birth defects  · Rest as needed  Put your feet up if you have swelling in your ankles and feet  · Do not smoke  If you smoke, it is never too late to quit  Smoking increases your risk of a miscarriage and other health problems during your pregnancy  Smoking can cause your baby to be born too early or weigh less at birth  Ask your healthcare provider for information if you need help quitting  · Do not drink alcohol  Alcohol passes from your body to your baby through the placenta  It can affect your baby's brain development and cause fetal alcohol syndrome (FAS)  FAS is a group of conditions that causes mental, behavior, and growth problems       · Talk to your healthcare provider before you take any medicines  Many medicines may harm your baby if you take them when you are pregnant  Do not take any medicines, vitamins, herbs, or supplements without first talking to your healthcare provider  Never use illegal or street drugs (such as marijuana or cocaine) while you are pregnant  · Talk to your healthcare provider before you travel  You may not be able to travel in an airplane after 36 weeks  He may also recommend that you avoid long road trips  What are some safety tips during pregnancy? · Avoid hot tubs and saunas  Do not use a hot tub or sauna while you are pregnant, especially during your first trimester  Hot tubs and saunas may raise your baby's temperature and increase the risk of birth defects  · Avoid toxoplasmosis  This is an infection caused by eating raw meat or being around infected cat feces  It can cause birth defects, miscarriages, and other problems  Wash your hands after you touch raw meat  Make sure any meat is well-cooked before you eat it  Avoid raw eggs and unpasteurized milk  Use gloves or ask someone else to clean your cat's litter box while you are pregnant  · Ask your healthcare provider about travel  The most comfortable time to travel is during the second trimester  Ask your healthcare provider if you can travel after 36 weeks  You may not be able to travel in an airplane after 36 weeks  He may also recommend that you avoid long road trips  What changes are happening with my baby? By 38 weeks, your baby may weigh between 6 and 9 pounds  Your baby may be about 14 inches long from the top of the head to the rump (baby's bottom)  Your baby hears well enough to know your voice  As your baby gets larger, you may feel fewer kicks and more stretching and rolling  Your baby may move into a head-down position  Your baby will also rest lower in your abdomen  What do I need to know about prenatal care?   Your healthcare provider will check your blood pressure and weight  You may also need the following:  · A urine test  may also be done to check for sugar and protein  These can be signs of gestational diabetes or infection  Protein in your urine may also be a sign of preeclampsia  Preeclampsia is a condition that can develop during week 20 or later of your pregnancy  It causes high blood pressure, and it can cause problems with your kidneys and other organs  · A blood test  may be done to check for anemia (low iron level)  · A Tdap vaccine  may be recommended by your healthcare provider  · A group B strep test  is a test that is done to check for group B strep infection  Group B strep is a type of bacteria that may be found in the vagina or rectum  It can be passed to your baby during delivery if you have it  Your healthcare provider will take swab your vagina or rectum and send the sample to the lab for tests  · Fundal height  is a measurement of your uterus to check your baby's growth  This number is usually the same as the number of weeks that you have been pregnant  Your healthcare provider may also check your baby's position  · Your baby's heart rate  will be checked  When should I seek immediate care? · You develop a severe headache that does not go away  · You have new or increased vision changes, such as blurred or spotted vision  · You have new or increased swelling in your face or hands  · You have vaginal spotting or bleeding  · Your water broke or you feel warm water gushing or trickling from your vagina  When should I contact my healthcare provider? · You have more than 5 contractions in 1 hour  · You notice any changes in your baby's movements  · You have abdominal cramps, pressure, or tightening  · You have a change in vaginal discharge  · You have chills or a fever  · You have vaginal itching, burning, or pain  · You have yellow, green, white, or foul-smelling vaginal discharge      · You have pain or burning when you urinate, less urine than usual, or pink or bloody urine  · You have questions or concerns about your condition or care  CARE AGREEMENT:   You have the right to help plan your care  Learn about your health condition and how it may be treated  Discuss treatment options with your caregivers to decide what care you want to receive  You always have the right to refuse treatment  The above information is an  only  It is not intended as medical advice for individual conditions or treatments  Talk to your doctor, nurse or pharmacist before following any medical regimen to see if it is safe and effective for you  © 2017 [employer] Information is for End User's use only and may not be sold, redistributed or otherwise used for commercial purposes  All illustrations and images included in CareNotes® are the copyrighted property of A D A M [employer]  or Rafa Garcia

## 2019-03-14 NOTE — LETTER
[address]  [address] [facility] [address] [facility] [address]  Dept: [phone]    March 14, 2019     Patient: Mayela Cazares   YOB: 1990   Date of Visit: 3/9/2019       To Whom it May Concern:    Andres Lujan is under my professional care  She was seen in the hospital from 3/9/2019   to 03/14/19  She may return to work on 3/18/19 without limitations  If you have any questions or concerns, please don't hesitate to call           Sincerely,          Halley Keith MD

## 2019-03-14 NOTE — PROGRESS NOTES
Triage Note - OB  Amanda Conti 33 y o  female MRN: [id number]  Unit/Bed#: LD Triage 1- Encounter: [id number]    Chief Complaint:   Chief Complaint   Patient presents with    Hypertension     sent from , elevated BP and "seeing stars here and there", denies HA or epigastric pain       ARCELIA: Estimated Date of Delivery: 19    HPI: 33 y o  female  at 35 1wga, sent from [facility] for r/o PreE   1 mildly elevated BP, on and off HA, and increased reflexes  Pt reports increased swelling over the last 2 days  Improved now with resting  No HA currently  No vision changes  No heartburn or belly pain     FHR: 120 Cat1 , no decels, mod variability, periods of min variability  [place]: quiet    Vitals: Blood pressure 134/87, pulse 74, temperature 97 9 °F (36 6 °C), temperature source Oral, resp  rate 18, last menstrual period 2018 ,There is no height or weight on file to calculate BMI      Physical Exam  GEN: well developed and well nourished, alert, oriented times 3 and appears comfortable  Abd: soft, non tender, positive bowel sounds and gravid  SVE: deferred  Reflexes - 3+    Labs:   Admission on 2019   Component Date Value    Sodium 2019 137     Potassium 2019 4 1     Chloride 2019 110*    CO2 2019 22     ANION GAP 2019 5     BUN 2019 12     Creatinine 2019 0 71     Glucose 2019 92     Calcium 2019 8 0*    AST 2019 14     ALT 2019 15     Alkaline Phosphatase 2019 128*    Total Protein 2019 5 9*    Albumin 2019 2 3*    Total Bilirubin 2019 0 12*    eGFR 2019 117     WBC 2019 11 91*    RBC 2019 3 75*    Hemoglobin 2019 11 2*    Hematocrit 2019 35 9     MCV 2019 96     MCH 2019 29 9     MCHC 2019 31 2*    RDW 2019 14 8     Platelets  228     MPV 2019 12 3     Creatinine, Ur 2019 245 0     Protein Urine Random 2019 67     Prot/Creat Ratio, Ur 2019 0 27*    Clarity, UA 2019 Clear     Color, UA 2019 Yellow     Specific Gravity, UA 2019 1 034*    pH, UA 2019 6 5     Glucose, UA 2019 Negative     Ketones, UA 2019 Negative     Blood, UA 2019 Negative     Protein, UA 2019 30 (1+)*    Nitrite, UA 2019 Negative     Bilirubin, UA 2019 Negative     Urobilinogen, UA 2019 0 2     Leukocytes, UA 2019 Negative     WBC, UA 2019 2-4*    RBC, UA 2019 None Seen     Hyaline Casts, UA 2019 3-5*    Bacteria, UA 2019 Occasional     Epithelial Cells 2019 Occasional        Lab, Imaging and other studies: I have personally reviewed pertinent reports  A/P:28y  @ 35 1 wga - IUGR  1)  Normal PreE labs - repeat Monday, reviewed s/s of PreE  2)Continue APFS  3) Discharge instructions given to patient and labor precautions reviewed

## 2019-03-14 NOTE — PROGRESS NOTES
Radha Lozoya had headache,epigastric pain and feeling sluggish today, she denied visual disturbance, her reflex was 3+ on all 3 extrEmities except 2+ on antecubital  Dr Lim Pu saw her at the bedside, reviewed tracing and recommended that she be evaluated at L&D for preeclampsia  I reviewed signs and symptoms of Preeclampsia with Amanda and she said she had F/P appt already  She went to L&D after our visit  Report given to Ron Blank RN  Charge nurse

## 2019-03-14 NOTE — LETTER
[address] [facility]  [address] [address]  Dept: [phone]    March 14, 2019     Patient: Merissa Barton   YOB: 1990   Date of Visit: 3/9/2019       To Whom it May Concern:    Sandra Anderson is under my professional care  She was seen in the hospital from 3/9/2019   to 03/14/19  She may return to work on 3/18/19 without limitations  If you have any questions or concerns, please don't hesitate to call           Sincerely,          Lata Liu MD

## 2019-03-18 ENCOUNTER — OFFICE VISIT (OUTPATIENT)
Dept: PERINATAL CARE | Facility: CLINIC | Age: 29
End: 2019-03-18
Payer: COMMERCIAL

## 2019-03-18 ENCOUNTER — TELEPHONE (OUTPATIENT)
Dept: OBGYN CLINIC | Facility: CLINIC | Age: 29
End: 2019-03-18

## 2019-03-18 ENCOUNTER — TRANSCRIBE ORDERS (OUTPATIENT)
Dept: LAB | Facility: HOSPITAL | Age: 29
End: 2019-03-18

## 2019-03-18 ENCOUNTER — APPOINTMENT (OUTPATIENT)
Dept: LAB | Facility: HOSPITAL | Age: 29
End: 2019-03-18
Attending: OBSTETRICS & GYNECOLOGY
Payer: COMMERCIAL

## 2019-03-18 VITALS
HEART RATE: 88 BPM | DIASTOLIC BLOOD PRESSURE: 87 MMHG | HEIGHT: 64 IN | BODY MASS INDEX: 34.08 KG/M2 | WEIGHT: 199.6 LBS | SYSTOLIC BLOOD PRESSURE: 132 MMHG

## 2019-03-18 DIAGNOSIS — O36.5930 POOR FETAL GROWTH AFFECTING MANAGEMENT OF MOTHER IN THIRD TRIMESTER, SINGLE OR UNSPECIFIED FETUS: Primary | ICD-10-CM

## 2019-03-18 DIAGNOSIS — O99.323 METHADONE MAINTENANCE TREATMENT AFFECTING PREGNANCY IN THIRD TRIMESTER (HCC): ICD-10-CM

## 2019-03-18 DIAGNOSIS — O35.0XX0 PREGNANCY COMPLICATED BY FETAL CEREBRAL VENTRICULOMEGALY, NOT APPLICABLE OR UNSPECIFIED FETUS: ICD-10-CM

## 2019-03-18 DIAGNOSIS — Z22.330 GBS CARRIER: ICD-10-CM

## 2019-03-18 DIAGNOSIS — Z3A.35 35 WEEKS GESTATION OF PREGNANCY: ICD-10-CM

## 2019-03-18 DIAGNOSIS — O14.13 PREECLAMPSIA, SEVERE, THIRD TRIMESTER: Primary | ICD-10-CM

## 2019-03-18 DIAGNOSIS — O14.13 PREECLAMPSIA, SEVERE, THIRD TRIMESTER: ICD-10-CM

## 2019-03-18 DIAGNOSIS — F11.20 METHADONE MAINTENANCE TREATMENT AFFECTING PREGNANCY IN THIRD TRIMESTER (HCC): ICD-10-CM

## 2019-03-18 LAB
ALBUMIN SERPL BCP-MCNC: 2.6 G/DL (ref 3.5–5)
ALP SERPL-CCNC: 143 U/L (ref 46–116)
ALT SERPL W P-5'-P-CCNC: 19 U/L (ref 12–78)
ANION GAP SERPL CALCULATED.3IONS-SCNC: 8 MMOL/L (ref 4–13)
AST SERPL W P-5'-P-CCNC: 21 U/L (ref 5–45)
BASOPHILS # BLD AUTO: 0.04 THOUSANDS/ΜL (ref 0–0.1)
BASOPHILS NFR BLD AUTO: 0 % (ref 0–1)
BILIRUB SERPL-MCNC: 0.18 MG/DL (ref 0.2–1)
BUN SERPL-MCNC: 15 MG/DL (ref 5–25)
CALCIUM SERPL-MCNC: 8.4 MG/DL (ref 8.3–10.1)
CHLORIDE SERPL-SCNC: 107 MMOL/L (ref 100–108)
CO2 SERPL-SCNC: 24 MMOL/L (ref 21–32)
CREAT SERPL-MCNC: 0.68 MG/DL (ref 0.6–1.3)
CREAT UR-MCNC: 132 MG/DL
EOSINOPHIL # BLD AUTO: 0 THOUSAND/ΜL (ref 0–0.61)
EOSINOPHIL NFR BLD AUTO: 0 % (ref 0–6)
ERYTHROCYTE [DISTWIDTH] IN BLOOD BY AUTOMATED COUNT: 14.8 % (ref 11.6–15.1)
GFR SERPL CREATININE-BSD FRML MDRD: 120 ML/MIN/1.73SQ M
GLUCOSE SERPL-MCNC: 83 MG/DL (ref 65–140)
HCT VFR BLD AUTO: 37.9 % (ref 34.8–46.1)
HGB BLD-MCNC: 12.1 G/DL (ref 11.5–15.4)
IMM GRANULOCYTES # BLD AUTO: 0.03 THOUSAND/UL (ref 0–0.2)
IMM GRANULOCYTES NFR BLD AUTO: 0 % (ref 0–2)
LYMPHOCYTES # BLD AUTO: 2.5 THOUSANDS/ΜL (ref 0.6–4.47)
LYMPHOCYTES NFR BLD AUTO: 27 % (ref 14–44)
MCH RBC QN AUTO: 29.7 PG (ref 26.8–34.3)
MCHC RBC AUTO-ENTMCNC: 31.9 G/DL (ref 31.4–37.4)
MCV RBC AUTO: 93 FL (ref 82–98)
MONOCYTES # BLD AUTO: 0.63 THOUSAND/ΜL (ref 0.17–1.22)
MONOCYTES NFR BLD AUTO: 7 % (ref 4–12)
NEUTROPHILS # BLD AUTO: 5.98 THOUSANDS/ΜL (ref 1.85–7.62)
NEUTS SEG NFR BLD AUTO: 66 % (ref 43–75)
NRBC BLD AUTO-RTO: 0 /100 WBCS
PLATELET # BLD AUTO: 238 THOUSANDS/UL (ref 149–390)
PMV BLD AUTO: 12.3 FL (ref 8.9–12.7)
POTASSIUM SERPL-SCNC: 4 MMOL/L (ref 3.5–5.3)
PROT SERPL-MCNC: 6.6 G/DL (ref 6.4–8.2)
PROT UR-MCNC: 35 MG/DL
PROT/CREAT UR: 0.27 MG/G{CREAT} (ref 0–0.1)
RBC # BLD AUTO: 4.07 MILLION/UL (ref 3.81–5.12)
SODIUM SERPL-SCNC: 139 MMOL/L (ref 136–145)
URATE SERPL-MCNC: 4.4 MG/DL (ref 2–6.8)
WBC # BLD AUTO: 9.18 THOUSAND/UL (ref 4.31–10.16)

## 2019-03-18 PROCEDURE — 84156 ASSAY OF PROTEIN URINE: CPT

## 2019-03-18 PROCEDURE — 76820 UMBILICAL ARTERY ECHO: CPT | Performed by: OBSTETRICS & GYNECOLOGY

## 2019-03-18 PROCEDURE — 76815 OB US LIMITED FETUS(S): CPT | Performed by: OBSTETRICS & GYNECOLOGY

## 2019-03-18 PROCEDURE — 84550 ASSAY OF BLOOD/URIC ACID: CPT

## 2019-03-18 PROCEDURE — 76821 MIDDLE CEREBRAL ARTERY ECHO: CPT | Performed by: OBSTETRICS & GYNECOLOGY

## 2019-03-18 PROCEDURE — 86778 TOXOPLASMA ANTIBODY IGM: CPT

## 2019-03-18 PROCEDURE — 36415 COLL VENOUS BLD VENIPUNCTURE: CPT

## 2019-03-18 PROCEDURE — 85025 COMPLETE CBC W/AUTO DIFF WBC: CPT

## 2019-03-18 PROCEDURE — 59025 FETAL NON-STRESS TEST: CPT | Performed by: OBSTETRICS & GYNECOLOGY

## 2019-03-18 PROCEDURE — 86777 TOXOPLASMA ANTIBODY: CPT

## 2019-03-18 PROCEDURE — 82570 ASSAY OF URINE CREATININE: CPT

## 2019-03-18 PROCEDURE — 80053 COMPREHEN METABOLIC PANEL: CPT

## 2019-03-18 NOTE — TELEPHONE ENCOUNTER
Patient left message on nurse line  Sates she is to have blood work today for pre-eclampsia  She has an appt today at 3:00 pm with M and would like results to be ready for her appt at   Routing to provider to order labs

## 2019-03-19 LAB
CLINICAL COMMENT: NORMAL
T GONDII IGG SERPL IA-ACNC: <3 IU/ML (ref 0–7.1)
T GONDII IGM SER IA-ACNC: <3 AU/ML (ref 0–7.9)

## 2019-03-20 ENCOUNTER — TELEPHONE (OUTPATIENT)
Dept: OBGYN CLINIC | Facility: CLINIC | Age: 29
End: 2019-03-20

## 2019-03-21 ENCOUNTER — ROUTINE PRENATAL (OUTPATIENT)
Dept: OBGYN CLINIC | Facility: CLINIC | Age: 29
End: 2019-03-21

## 2019-03-21 VITALS — WEIGHT: 200.6 LBS | DIASTOLIC BLOOD PRESSURE: 82 MMHG | SYSTOLIC BLOOD PRESSURE: 130 MMHG | BODY MASS INDEX: 34.43 KG/M2

## 2019-03-21 DIAGNOSIS — Z22.330 GBS CARRIER: ICD-10-CM

## 2019-03-21 DIAGNOSIS — O36.5930 POOR FETAL GROWTH AFFECTING MANAGEMENT OF MOTHER IN THIRD TRIMESTER, SINGLE OR UNSPECIFIED FETUS: ICD-10-CM

## 2019-03-21 DIAGNOSIS — Z34.03 ENCOUNTER FOR SUPERVISION OF NORMAL FIRST PREGNANCY IN THIRD TRIMESTER: Primary | ICD-10-CM

## 2019-03-21 DIAGNOSIS — F19.11 HISTORY OF DRUG ABUSE (HCC): ICD-10-CM

## 2019-03-21 DIAGNOSIS — Z3A.36 36 WEEKS GESTATION OF PREGNANCY: ICD-10-CM

## 2019-03-21 PROCEDURE — 80307 DRUG TEST PRSMV CHEM ANLYZR: CPT | Performed by: OBSTETRICS & GYNECOLOGY

## 2019-03-21 PROCEDURE — PNV: Performed by: OBSTETRICS & GYNECOLOGY

## 2019-03-21 NOTE — TELEPHONE ENCOUNTER
Provider on Call Note:  Patient sent a message today patient sent a message today to doctor's office and angular asking questions about her blood work    I have called patient and discussed with her that all blood work that was ordered was appropriate, and blood work is normal   She will be seen tomorrow at her prenatal

## 2019-03-21 NOTE — PROGRESS NOTES
This is a 33 y o   at 36w1d who presents for return OB visit  No complaints  Denies contractions, leakage, bleeding  Endorses fetal movement  Denies h/a, vision changes, RUQ pain  Cont to have significant LE edema  Enc compression stockings  BP: 130/82 TWlb    Elevated BP: Noted at [facility] visit  No other elevated BP since  2+ proteinuria today with pitting LE edema  Discussed s/sx of pre-E  Cont to check BP daily at work and call if > 160/110  Labs normal on Monday  Suspected IUGR: NST reactive today  Has US with [facility] next week  Methadone use: UDS today  GBS positive  Patient plans to breastfeed  Plans for contraception: undecided  Considering [facility]   Cont to discuss

## 2019-03-22 LAB
AMPHETAMINES SERPL QL SCN: POSITIVE
BARBITURATES UR QL: NEGATIVE
BENZODIAZ UR QL: POSITIVE
COCAINE UR QL: NEGATIVE
METHADONE UR QL: POSITIVE
OPIATES UR QL SCN: NEGATIVE
PCP UR QL: NEGATIVE
THC UR QL: NEGATIVE

## 2019-03-23 ENCOUNTER — TELEPHONE (OUTPATIENT)
Dept: LABOR AND DELIVERY | Facility: HOSPITAL | Age: 29
End: 2019-03-23

## 2019-03-23 NOTE — TELEPHONE ENCOUNTER
On Call Provider Note: Patient called the on call provider at 3am stating that she had a headache  Her bp was 130's/80's  Patient advised to report to triage for evaluation

## 2019-03-23 NOTE — TELEPHONE ENCOUNTER
On call provider note: Patient calls stating that her bp was 140/109  She never went to triage for her headache last night because her headache went away  Discussed with patient that if she has another headache that doesn't go away, she should go to triage   Expressed understanding

## 2019-03-25 ENCOUNTER — OFFICE VISIT (OUTPATIENT)
Dept: PERINATAL CARE | Facility: CLINIC | Age: 29
End: 2019-03-25
Payer: COMMERCIAL

## 2019-03-25 ENCOUNTER — APPOINTMENT (OUTPATIENT)
Dept: LAB | Facility: HOSPITAL | Age: 29
End: 2019-03-25
Attending: OBSTETRICS & GYNECOLOGY
Payer: COMMERCIAL

## 2019-03-25 ENCOUNTER — TELEPHONE (OUTPATIENT)
Dept: OBGYN CLINIC | Facility: CLINIC | Age: 29
End: 2019-03-25

## 2019-03-25 ENCOUNTER — HOSPITAL ENCOUNTER (INPATIENT)
Facility: HOSPITAL | Age: 29
LOS: 5 days | Discharge: HOME | End: 2019-03-30
Attending: OBSTETRICS & GYNECOLOGY | Admitting: OBSTETRICS & GYNECOLOGY
Payer: COMMERCIAL

## 2019-03-25 VITALS
WEIGHT: 202.2 LBS | HEART RATE: 94 BPM | SYSTOLIC BLOOD PRESSURE: 136 MMHG | DIASTOLIC BLOOD PRESSURE: 90 MMHG | BODY MASS INDEX: 34.71 KG/M2

## 2019-03-25 DIAGNOSIS — F11.20 METHADONE MAINTENANCE TREATMENT AFFECTING PREGNANCY IN THIRD TRIMESTER (HCC): ICD-10-CM

## 2019-03-25 DIAGNOSIS — Z98.891 S/P CESAREAN SECTION: ICD-10-CM

## 2019-03-25 DIAGNOSIS — O35.9XX0 TERATOGEN EXPOSURE IN CURRENT PREGNANCY, SINGLE OR UNSPECIFIED FETUS: ICD-10-CM

## 2019-03-25 DIAGNOSIS — Z36.89 ENCOUNTER FOR ULTRASOUND TO CHECK FETAL GROWTH: ICD-10-CM

## 2019-03-25 DIAGNOSIS — O99.323 METHADONE MAINTENANCE TREATMENT AFFECTING PREGNANCY IN THIRD TRIMESTER (HCC): ICD-10-CM

## 2019-03-25 DIAGNOSIS — O16.3 ELEVATED BLOOD PRESSURE AFFECTING PREGNANCY IN THIRD TRIMESTER, ANTEPARTUM: ICD-10-CM

## 2019-03-25 DIAGNOSIS — O36.5930 POOR FETAL GROWTH AFFECTING MANAGEMENT OF MOTHER IN THIRD TRIMESTER, SINGLE OR UNSPECIFIED FETUS: Primary | ICD-10-CM

## 2019-03-25 DIAGNOSIS — O14.93 PREECLAMPSIA, THIRD TRIMESTER: ICD-10-CM

## 2019-03-25 DIAGNOSIS — Z3A.36 36 WEEKS GESTATION OF PREGNANCY: Primary | ICD-10-CM

## 2019-03-25 DIAGNOSIS — F19.20 DRUG ABUSE AND DEPENDENCE (HCC): ICD-10-CM

## 2019-03-25 DIAGNOSIS — O16.3 ELEVATED BLOOD PRESSURE AFFECTING PREGNANCY IN THIRD TRIMESTER, ANTEPARTUM: Primary | ICD-10-CM

## 2019-03-25 DIAGNOSIS — Z3A.36 36 WEEKS GESTATION OF PREGNANCY: ICD-10-CM

## 2019-03-25 LAB
ABO GROUP BLD: NORMAL
ALBUMIN SERPL BCP-MCNC: 2.4 G/DL (ref 3.5–5)
ALP SERPL-CCNC: 146 U/L (ref 46–116)
ALT SERPL W P-5'-P-CCNC: 24 U/L (ref 12–78)
AMPHETAMINES SERPL QL SCN: POSITIVE
ANION GAP SERPL CALCULATED.3IONS-SCNC: 7 MMOL/L (ref 4–13)
AST SERPL W P-5'-P-CCNC: 25 U/L (ref 5–45)
BARBITURATES UR QL: NEGATIVE
BASOPHILS # BLD AUTO: 0.02 THOUSANDS/ΜL (ref 0–0.1)
BASOPHILS NFR BLD AUTO: 0 % (ref 0–1)
BENZODIAZ UR QL: NEGATIVE
BILIRUB SERPL-MCNC: 0.21 MG/DL (ref 0.2–1)
BLD GP AB SCN SERPL QL: NEGATIVE
BUN SERPL-MCNC: 13 MG/DL (ref 5–25)
CALCIUM SERPL-MCNC: 8.5 MG/DL (ref 8.3–10.1)
CHLORIDE SERPL-SCNC: 109 MMOL/L (ref 100–108)
CO2 SERPL-SCNC: 21 MMOL/L (ref 21–32)
COCAINE UR QL: NEGATIVE
CREAT SERPL-MCNC: 0.72 MG/DL (ref 0.6–1.3)
CREAT UR-MCNC: 229 MG/DL
EOSINOPHIL # BLD AUTO: 0 THOUSAND/ΜL (ref 0–0.61)
EOSINOPHIL NFR BLD AUTO: 0 % (ref 0–6)
ERYTHROCYTE [DISTWIDTH] IN BLOOD BY AUTOMATED COUNT: 15.2 % (ref 11.6–15.1)
GFR SERPL CREATININE-BSD FRML MDRD: 115 ML/MIN/1.73SQ M
GLUCOSE P FAST SERPL-MCNC: 72 MG/DL (ref 65–99)
HCT VFR BLD AUTO: 33.2 % (ref 34.8–46.1)
HGB BLD-MCNC: 10.9 G/DL (ref 11.5–15.4)
IMM GRANULOCYTES # BLD AUTO: 0.02 THOUSAND/UL (ref 0–0.2)
IMM GRANULOCYTES NFR BLD AUTO: 0 % (ref 0–2)
LYMPHOCYTES # BLD AUTO: 1.68 THOUSANDS/ΜL (ref 0.6–4.47)
LYMPHOCYTES NFR BLD AUTO: 19 % (ref 14–44)
MCH RBC QN AUTO: 30.2 PG (ref 26.8–34.3)
MCHC RBC AUTO-ENTMCNC: 32.8 G/DL (ref 31.4–37.4)
MCV RBC AUTO: 92 FL (ref 82–98)
METHADONE UR QL: POSITIVE
MONOCYTES # BLD AUTO: 0.54 THOUSAND/ΜL (ref 0.17–1.22)
MONOCYTES NFR BLD AUTO: 6 % (ref 4–12)
NEUTROPHILS # BLD AUTO: 6.64 THOUSANDS/ΜL (ref 1.85–7.62)
NEUTS SEG NFR BLD AUTO: 75 % (ref 43–75)
NRBC BLD AUTO-RTO: 0 /100 WBCS
OPIATES UR QL SCN: NEGATIVE
PCP UR QL: NEGATIVE
PLATELET # BLD AUTO: 199 THOUSANDS/UL (ref 149–390)
PMV BLD AUTO: 11.7 FL (ref 8.9–12.7)
POTASSIUM SERPL-SCNC: 4.1 MMOL/L (ref 3.5–5.3)
PROT SERPL-MCNC: 6.4 G/DL (ref 6.4–8.2)
PROT UR-MCNC: 132 MG/DL
PROT/CREAT UR: 0.58 MG/G{CREAT} (ref 0–0.1)
RBC # BLD AUTO: 3.61 MILLION/UL (ref 3.81–5.12)
RH BLD: POSITIVE
SODIUM SERPL-SCNC: 137 MMOL/L (ref 136–145)
SPECIMEN EXPIRATION DATE: NORMAL
THC UR QL: NEGATIVE
WBC # BLD AUTO: 8.9 THOUSAND/UL (ref 4.31–10.16)

## 2019-03-25 PROCEDURE — 59025 FETAL NON-STRESS TEST: CPT | Performed by: OBSTETRICS & GYNECOLOGY

## 2019-03-25 PROCEDURE — 76820 UMBILICAL ARTERY ECHO: CPT | Performed by: OBSTETRICS & GYNECOLOGY

## 2019-03-25 PROCEDURE — 86592 SYPHILIS TEST NON-TREP QUAL: CPT | Performed by: OBSTETRICS & GYNECOLOGY

## 2019-03-25 PROCEDURE — 99212 OFFICE O/P EST SF 10 MIN: CPT | Performed by: OBSTETRICS & GYNECOLOGY

## 2019-03-25 PROCEDURE — 86900 BLOOD TYPING SEROLOGIC ABO: CPT | Performed by: OBSTETRICS & GYNECOLOGY

## 2019-03-25 PROCEDURE — 80307 DRUG TEST PRSMV CHEM ANLYZR: CPT | Performed by: OBSTETRICS & GYNECOLOGY

## 2019-03-25 PROCEDURE — 76816 OB US FOLLOW-UP PER FETUS: CPT | Performed by: OBSTETRICS & GYNECOLOGY

## 2019-03-25 PROCEDURE — 36415 COLL VENOUS BLD VENIPUNCTURE: CPT

## 2019-03-25 PROCEDURE — 84156 ASSAY OF PROTEIN URINE: CPT

## 2019-03-25 PROCEDURE — 82570 ASSAY OF URINE CREATININE: CPT

## 2019-03-25 PROCEDURE — 4A1HXCZ MONITORING OF PRODUCTS OF CONCEPTION, CARDIAC RATE, EXTERNAL APPROACH: ICD-10-PCS | Performed by: OBSTETRICS & GYNECOLOGY

## 2019-03-25 PROCEDURE — 85025 COMPLETE CBC W/AUTO DIFF WBC: CPT | Performed by: OBSTETRICS & GYNECOLOGY

## 2019-03-25 PROCEDURE — 86901 BLOOD TYPING SEROLOGIC RH(D): CPT | Performed by: OBSTETRICS & GYNECOLOGY

## 2019-03-25 PROCEDURE — 86850 RBC ANTIBODY SCREEN: CPT | Performed by: OBSTETRICS & GYNECOLOGY

## 2019-03-25 PROCEDURE — 80053 COMPREHEN METABOLIC PANEL: CPT

## 2019-03-25 RX ORDER — CLONAZEPAM 0.5 MG/1
0.5 TABLET ORAL 2 TIMES DAILY PRN
COMMUNITY
End: 2019-12-18

## 2019-03-25 RX ORDER — SODIUM CHLORIDE, SODIUM LACTATE, POTASSIUM CHLORIDE, CALCIUM CHLORIDE 600; 310; 30; 20 MG/100ML; MG/100ML; MG/100ML; MG/100ML
125 INJECTION, SOLUTION INTRAVENOUS CONTINUOUS
Status: DISCONTINUED | OUTPATIENT
Start: 2019-03-25 | End: 2019-03-26

## 2019-03-25 RX ORDER — ACETAMINOPHEN 325 MG/1
650 TABLET ORAL EVERY 6 HOURS PRN
Status: DISCONTINUED | OUTPATIENT
Start: 2019-03-25 | End: 2019-03-26

## 2019-03-25 RX ADMIN — SODIUM CHLORIDE, SODIUM LACTATE, POTASSIUM CHLORIDE, AND CALCIUM CHLORIDE 125 ML/HR: .6; .31; .03; .02 INJECTION, SOLUTION INTRAVENOUS at 20:19

## 2019-03-25 RX ADMIN — MISOPROSTOL 25 MCG: 100 TABLET ORAL at 20:00

## 2019-03-25 RX ADMIN — SODIUM CHLORIDE 5 MILLION UNITS: 0.9 INJECTION, SOLUTION INTRAVENOUS at 20:17

## 2019-03-25 RX ADMIN — ACETAMINOPHEN 650 MG: 325 TABLET ORAL at 22:22

## 2019-03-25 NOTE — PROGRESS NOTES
[address]: Ms Triston Amador was seen today at 43C7D for umbilical artery Doppler study, OMKAR, NST, and growth ultrasound  See ultrasound report under "OB Procedures" tab  Please don't hesitate to contact our office with any concerns or questions    Sera Kumari MD

## 2019-03-25 NOTE — PATIENT INSTRUCTIONS
Thank you for choosing [id number] for your  care today  If you have any questions about your ultrasound or care, please do not hesitate to contact us or your primary obstetrician  Please go to Labor and Delivery for admission and delivery

## 2019-03-25 NOTE — PROGRESS NOTES
B/p obtained- 140/100 noted  Pt states she currently has no headache, but has spots and epigastric discomfort  Pt states she had headache, and epigastric discomfort on Friday night and ended up contacting Dr Melvin Dominguez on Saturday at 2am   Pt stated that when she went to the Methadone clinic on Saturday her blood pressure was 134/100 and in the afternoon at [facility] b/p was 140/101  Pt states she called her OB who told her she needed lab work drawn on Monday  Pt states she had lab work drawn right before her appointment in our office  Dr Harpreet Dunn notified of above- requests OMKAR be changed to growth and dopplers  Pt off monitor and for ultrasound

## 2019-03-26 ENCOUNTER — ANESTHESIA EVENT (INPATIENT)
Dept: LABOR AND DELIVERY | Facility: HOSPITAL | Age: 29
End: 2019-03-26
Payer: COMMERCIAL

## 2019-03-26 ENCOUNTER — ANESTHESIA (INPATIENT)
Dept: LABOR AND DELIVERY | Facility: HOSPITAL | Age: 29
End: 2019-03-26
Payer: COMMERCIAL

## 2019-03-26 PROBLEM — Z98.891 S/P CESAREAN SECTION: Status: ACTIVE | Noted: 2019-03-26

## 2019-03-26 LAB
BASE EXCESS BLDCOV CALC-SCNC: -3.6 MMOL/L (ref 1–9)
HCO3 BLDCOV-SCNC: 22 MMOL/L (ref 12.2–28.6)
OXYHGB MFR BLDCOV: 63.7 %
PCO2 BLDCOV: 42 MM HG (ref 27–43)
PH BLDCOV: 7.34 [PH] (ref 7.19–7.49)
PO2 BLDCOV: 25.8 MM HG (ref 15–45)
RPR SER QL: NORMAL
SAO2 % BLDCOV: 13.4 ML/DL

## 2019-03-26 PROCEDURE — 94762 N-INVAS EAR/PLS OXIMTRY CONT: CPT

## 2019-03-26 PROCEDURE — 3E033VJ INTRODUCTION OF OTHER HORMONE INTO PERIPHERAL VEIN, PERCUTANEOUS APPROACH: ICD-10-PCS | Performed by: OBSTETRICS & GYNECOLOGY

## 2019-03-26 PROCEDURE — 82805 BLOOD GASES W/O2 SATURATION: CPT | Performed by: OBSTETRICS & GYNECOLOGY

## 2019-03-26 PROCEDURE — 10H07YZ INSERTION OF OTHER DEVICE INTO PRODUCTS OF CONCEPTION, VIA NATURAL OR ARTIFICIAL OPENING: ICD-10-PCS | Performed by: OBSTETRICS & GYNECOLOGY

## 2019-03-26 PROCEDURE — 94760 N-INVAS EAR/PLS OXIMETRY 1: CPT

## 2019-03-26 PROCEDURE — 4A1H7CZ MONITORING OF PRODUCTS OF CONCEPTION, CARDIAC RATE, VIA NATURAL OR ARTIFICIAL OPENING: ICD-10-PCS | Performed by: OBSTETRICS & GYNECOLOGY

## 2019-03-26 PROCEDURE — 59510 CESAREAN DELIVERY: CPT | Performed by: OBSTETRICS & GYNECOLOGY

## 2019-03-26 RX ORDER — HYDROMORPHONE HCL/PF 1 MG/ML
1 SYRINGE (ML) INJECTION EVERY 2 HOUR PRN
Status: DISCONTINUED | OUTPATIENT
Start: 2019-03-26 | End: 2019-03-28

## 2019-03-26 RX ORDER — HYDROMORPHONE HCL/PF 1 MG/ML
0.5 SYRINGE (ML) INJECTION
Status: DISCONTINUED | OUTPATIENT
Start: 2019-03-26 | End: 2019-03-26

## 2019-03-26 RX ORDER — DIPHENHYDRAMINE HCL 25 MG
25 TABLET ORAL EVERY 6 HOURS PRN
Status: DISCONTINUED | OUTPATIENT
Start: 2019-03-26 | End: 2019-03-30 | Stop reason: HOSPADM

## 2019-03-26 RX ORDER — CEFAZOLIN SODIUM 2 G/50ML
2000 SOLUTION INTRAVENOUS ONCE
Status: COMPLETED | OUTPATIENT
Start: 2019-03-26 | End: 2019-03-26

## 2019-03-26 RX ORDER — KETOROLAC TROMETHAMINE 30 MG/ML
30 INJECTION, SOLUTION INTRAMUSCULAR; INTRAVENOUS EVERY 6 HOURS
Status: DISCONTINUED | OUTPATIENT
Start: 2019-03-26 | End: 2019-03-27

## 2019-03-26 RX ORDER — ONDANSETRON 2 MG/ML
4 INJECTION INTRAMUSCULAR; INTRAVENOUS EVERY 8 HOURS PRN
Status: DISCONTINUED | OUTPATIENT
Start: 2019-03-26 | End: 2019-03-30 | Stop reason: HOSPADM

## 2019-03-26 RX ORDER — OXYTOCIN/RINGER'S LACTATE 30/500 ML
2 PLASTIC BAG, INJECTION (ML) INTRAVENOUS CONTINUOUS
Status: DISCONTINUED | OUTPATIENT
Start: 2019-03-26 | End: 2019-03-26

## 2019-03-26 RX ORDER — HYDROMORPHONE HCL/PF 1 MG/ML
0.5 SYRINGE (ML) INJECTION ONCE
Status: COMPLETED | OUTPATIENT
Start: 2019-03-26 | End: 2019-03-26

## 2019-03-26 RX ORDER — ACETAMINOPHEN 325 MG/1
650 TABLET ORAL EVERY 6 HOURS PRN
Status: DISCONTINUED | OUTPATIENT
Start: 2019-03-26 | End: 2019-03-28

## 2019-03-26 RX ORDER — LIDOCAINE HYDROCHLORIDE AND EPINEPHRINE 20; 5 MG/ML; UG/ML
INJECTION, SOLUTION EPIDURAL; INFILTRATION; INTRACAUDAL; PERINEURAL AS NEEDED
Status: DISCONTINUED | OUTPATIENT
Start: 2019-03-26 | End: 2019-03-26 | Stop reason: SURG

## 2019-03-26 RX ORDER — METHADONE HYDROCHLORIDE 10 MG/ML
85 CONCENTRATE ORAL DAILY
Status: DISCONTINUED | OUTPATIENT
Start: 2019-03-27 | End: 2019-03-30 | Stop reason: HOSPADM

## 2019-03-26 RX ORDER — OXYTOCIN/RINGER'S LACTATE 30/500 ML
PLASTIC BAG, INJECTION (ML) INTRAVENOUS
Status: COMPLETED
Start: 2019-03-26 | End: 2019-03-26

## 2019-03-26 RX ORDER — IBUPROFEN 600 MG/1
600 TABLET ORAL EVERY 6 HOURS PRN
Status: DISCONTINUED | OUTPATIENT
Start: 2019-03-26 | End: 2019-03-28

## 2019-03-26 RX ORDER — ACETAMINOPHEN 325 MG/1
650 TABLET ORAL EVERY 6 HOURS PRN
Status: DISCONTINUED | OUTPATIENT
Start: 2019-03-26 | End: 2019-03-26

## 2019-03-26 RX ORDER — ONDANSETRON 2 MG/ML
4 INJECTION INTRAMUSCULAR; INTRAVENOUS ONCE AS NEEDED
Status: DISCONTINUED | OUTPATIENT
Start: 2019-03-26 | End: 2019-03-27

## 2019-03-26 RX ORDER — DIAPER,BRIEF,INFANT-TODD,DISP
1 EACH MISCELLANEOUS DAILY PRN
Status: DISCONTINUED | OUTPATIENT
Start: 2019-03-26 | End: 2019-03-30 | Stop reason: HOSPADM

## 2019-03-26 RX ORDER — CALCIUM CARBONATE 200(500)MG
1000 TABLET,CHEWABLE ORAL DAILY PRN
Status: DISCONTINUED | OUTPATIENT
Start: 2019-03-26 | End: 2019-03-30 | Stop reason: HOSPADM

## 2019-03-26 RX ORDER — PROMETHAZINE HYDROCHLORIDE 25 MG/ML
6.25 INJECTION, SOLUTION INTRAMUSCULAR; INTRAVENOUS ONCE AS NEEDED
Status: DISCONTINUED | OUTPATIENT
Start: 2019-03-26 | End: 2019-03-27

## 2019-03-26 RX ORDER — FENTANYL CITRATE/PF 50 MCG/ML
50 SYRINGE (ML) INJECTION
Status: DISCONTINUED | OUTPATIENT
Start: 2019-03-26 | End: 2019-03-27

## 2019-03-26 RX ORDER — OXYTOCIN/RINGER'S LACTATE 30/500 ML
62.5 PLASTIC BAG, INJECTION (ML) INTRAVENOUS CONTINUOUS
Status: ACTIVE | OUTPATIENT
Start: 2019-03-26 | End: 2019-03-26

## 2019-03-26 RX ORDER — DEXTROAMPHETAMINE SACCHARATE, AMPHETAMINE ASPARTATE, DEXTROAMPHETAMINE SULFATE AND AMPHETAMINE SULFATE 2.5; 2.5; 2.5; 2.5 MG/1; MG/1; MG/1; MG/1
30 TABLET ORAL 2 TIMES DAILY
Status: DISCONTINUED | OUTPATIENT
Start: 2019-03-26 | End: 2019-03-30 | Stop reason: HOSPADM

## 2019-03-26 RX ORDER — OXYTOCIN/RINGER'S LACTATE 30/500 ML
1-30 PLASTIC BAG, INJECTION (ML) INTRAVENOUS
Status: DISCONTINUED | OUTPATIENT
Start: 2019-03-26 | End: 2019-03-26

## 2019-03-26 RX ORDER — SODIUM CHLORIDE, SODIUM LACTATE, POTASSIUM CHLORIDE, CALCIUM CHLORIDE 600; 310; 30; 20 MG/100ML; MG/100ML; MG/100ML; MG/100ML
125 INJECTION, SOLUTION INTRAVENOUS CONTINUOUS
Status: DISCONTINUED | OUTPATIENT
Start: 2019-03-26 | End: 2019-03-30 | Stop reason: HOSPADM

## 2019-03-26 RX ORDER — HYDROMORPHONE HCL/PF 1 MG/ML
0.5 SYRINGE (ML) INJECTION
Status: DISCONTINUED | OUTPATIENT
Start: 2019-03-26 | End: 2019-03-27

## 2019-03-26 RX ORDER — KETOROLAC TROMETHAMINE 30 MG/ML
30 INJECTION, SOLUTION INTRAMUSCULAR; INTRAVENOUS ONCE AS NEEDED
Status: COMPLETED | OUTPATIENT
Start: 2019-03-26 | End: 2019-03-26

## 2019-03-26 RX ORDER — DOCUSATE SODIUM 100 MG/1
100 CAPSULE, LIQUID FILLED ORAL 2 TIMES DAILY
Status: DISCONTINUED | OUTPATIENT
Start: 2019-03-26 | End: 2019-03-30 | Stop reason: HOSPADM

## 2019-03-26 RX ADMIN — ROPIVACAINE HYDROCHLORIDE: 5 INJECTION, SOLUTION EPIDURAL; INFILTRATION; PERINEURAL at 09:15

## 2019-03-26 RX ADMIN — AZITHROMYCIN 500 MG: 500 INJECTION, POWDER, LYOPHILIZED, FOR SOLUTION INTRAVENOUS at 13:50

## 2019-03-26 RX ADMIN — Medication 62.5 MILLI-UNITS/MIN: at 15:15

## 2019-03-26 RX ADMIN — Medication 2 UNITS: at 00:21

## 2019-03-26 RX ADMIN — HYDROMORPHONE HYDROCHLORIDE 0.5 MG: 1 INJECTION, SOLUTION INTRAMUSCULAR; INTRAVENOUS; SUBCUTANEOUS at 02:33

## 2019-03-26 RX ADMIN — LIDOCAINE HYDROCHLORIDE AND EPINEPHRINE 5 ML: 20; 5 INJECTION, SOLUTION EPIDURAL; INFILTRATION; INTRACAUDAL; PERINEURAL at 13:17

## 2019-03-26 RX ADMIN — SODIUM CHLORIDE, SODIUM LACTATE, POTASSIUM CHLORIDE, AND CALCIUM CHLORIDE 125 ML/HR: .6; .31; .03; .02 INJECTION, SOLUTION INTRAVENOUS at 16:23

## 2019-03-26 RX ADMIN — HYDROMORPHONE HYDROCHLORIDE 0.5 MG: 1 INJECTION, SOLUTION INTRAMUSCULAR; INTRAVENOUS; SUBCUTANEOUS at 19:33

## 2019-03-26 RX ADMIN — SODIUM CHLORIDE, SODIUM LACTATE, POTASSIUM CHLORIDE, AND CALCIUM CHLORIDE 125 ML/HR: .6; .31; .03; .02 INJECTION, SOLUTION INTRAVENOUS at 23:56

## 2019-03-26 RX ADMIN — ACETAMINOPHEN 650 MG: 325 TABLET ORAL at 08:13

## 2019-03-26 RX ADMIN — SODIUM CHLORIDE 2.5 MILLION UNITS: 9 INJECTION, SOLUTION INTRAVENOUS at 09:25

## 2019-03-26 RX ADMIN — CEFAZOLIN SODIUM 2000 MG: 2 SOLUTION INTRAVENOUS at 13:45

## 2019-03-26 RX ADMIN — SODIUM CHLORIDE, SODIUM LACTATE, POTASSIUM CHLORIDE, AND CALCIUM CHLORIDE 125 ML/HR: .6; .31; .03; .02 INJECTION, SOLUTION INTRAVENOUS at 08:41

## 2019-03-26 RX ADMIN — FENTANYL CITRATE 50 MCG: 50 INJECTION, SOLUTION INTRAMUSCULAR; INTRAVENOUS at 15:50

## 2019-03-26 RX ADMIN — SODIUM CHLORIDE 2.5 MILLION UNITS: 9 INJECTION, SOLUTION INTRAVENOUS at 05:27

## 2019-03-26 RX ADMIN — HYDROMORPHONE HYDROCHLORIDE 0.5 MG: 1 INJECTION, SOLUTION INTRAMUSCULAR; INTRAVENOUS; SUBCUTANEOUS at 01:33

## 2019-03-26 RX ADMIN — SODIUM CHLORIDE, SODIUM LACTATE, POTASSIUM CHLORIDE, AND CALCIUM CHLORIDE 125 ML/HR: .6; .31; .03; .02 INJECTION, SOLUTION INTRAVENOUS at 12:10

## 2019-03-26 RX ADMIN — LIDOCAINE HYDROCHLORIDE AND EPINEPHRINE 5 ML: 20; 5 INJECTION, SOLUTION EPIDURAL; INFILTRATION; INTRACAUDAL; PERINEURAL at 13:29

## 2019-03-26 RX ADMIN — FENTANYL CITRATE 50 MCG: 50 INJECTION, SOLUTION INTRAMUSCULAR; INTRAVENOUS at 15:36

## 2019-03-26 RX ADMIN — HYDROMORPHONE HYDROCHLORIDE 0.5 MG: 1 INJECTION, SOLUTION INTRAMUSCULAR; INTRAVENOUS; SUBCUTANEOUS at 16:38

## 2019-03-26 RX ADMIN — SODIUM CHLORIDE, SODIUM LACTATE, POTASSIUM CHLORIDE, AND CALCIUM CHLORIDE 125 ML/HR: .6; .31; .03; .02 INJECTION, SOLUTION INTRAVENOUS at 01:58

## 2019-03-26 RX ADMIN — LIDOCAINE HYDROCHLORIDE AND EPINEPHRINE 5 ML: 20; 5 INJECTION, SOLUTION EPIDURAL; INFILTRATION; INTRACAUDAL; PERINEURAL at 13:21

## 2019-03-26 RX ADMIN — DOCUSATE SODIUM 100 MG: 100 CAPSULE, LIQUID FILLED ORAL at 19:29

## 2019-03-26 RX ADMIN — LIDOCAINE HYDROCHLORIDE AND EPINEPHRINE 5 ML: 20; 5 INJECTION, SOLUTION EPIDURAL; INFILTRATION; INTRACAUDAL; PERINEURAL at 13:25

## 2019-03-26 RX ADMIN — KETOROLAC TROMETHAMINE 30 MG: 30 INJECTION, SOLUTION INTRAMUSCULAR; INTRAVENOUS at 22:12

## 2019-03-26 RX ADMIN — SODIUM CHLORIDE 2.5 MILLION UNITS: 9 INJECTION, SOLUTION INTRAVENOUS at 00:18

## 2019-03-26 RX ADMIN — KETOROLAC TROMETHAMINE 30 MG: 30 INJECTION, SOLUTION INTRAMUSCULAR; INTRAVENOUS at 15:15

## 2019-03-26 RX ADMIN — ROPIVACAINE HYDROCHLORIDE: 5 INJECTION, SOLUTION EPIDURAL; INFILTRATION; PERINEURAL at 15:33

## 2019-03-26 RX ADMIN — METHADONE HYDROCHLORIDE 85 MG: 10 TABLET ORAL at 06:39

## 2019-03-26 NOTE — ANESTHESIA POSTPROCEDURE EVALUATION
Post-Op Assessment Note    CV Status:  Stable    Pain management: adequate     Mental Status:  Sleepy and arousable   Hydration Status:  Euvolemic   PONV Controlled:  Controlled   Airway Patency:  Patent   Post Op Vitals Reviewed: Yes      Staff: Anesthesiologist   Comments: Epidural catheter left in place, conections checked, site clear, dressing intact no bleeding or drainage at insertion site          BP      Temp      Pulse     Resp      SpO2

## 2019-03-26 NOTE — ANESTHESIA PREPROCEDURE EVALUATION
Review of Systems/Medical History  Patient summary reviewed  Chart reviewed      Cardiovascular  Hypertension ,    Pulmonary  Asthma ,        GI/Hepatic            Endo/Other  History of thyroid disease ,   Obesity    GYN  Currently pregnant , Prior pregnancy/OB history ,          Hematology   Musculoskeletal       Neurology   Psychology   Anxiety, Depression ,   Chronic opioid dependence (on methadone) Chronic pain,            Physical Exam    Airway    Mallampati score: II  TM Distance: >3 FB  Neck ROM: full     Dental   No notable dental hx     Cardiovascular  Cardiovascular exam normal    Pulmonary  Pulmonary exam normal     Other Findings        Anesthesia Plan  ASA Score- 3     Anesthesia Type- epidural with ASA Monitors  Additional Monitors:   Airway Plan:         Plan Factors-    Induction-     Postoperative Plan-     Informed Consent- Anesthetic plan and risks discussed with patient

## 2019-03-26 NOTE — ANESTHESIA PROCEDURE NOTES
CSE Block    Patient location during procedure: OB  Start time: 3/26/2019 9:07 AM  Reason for block: procedure for pain and at surgeon's request  Staffing  Anesthesiologist: Nathan Waterman MD  Performed: anesthesiologist   Preanesthetic Checklist  Completed: patient identified, site marked, surgical consent, pre-op evaluation, timeout performed, IV checked, risks and benefits discussed and monitors and equipment checked  CSE  Patient position: sitting  Prep: Betadine  Patient monitoring: heart rate, cardiac monitor, continuous pulse ox and frequent blood pressure checks  Approach: midline  Spinal Needle  Needle type: pencil-tip   Needle gauge: 27 G  Needle length: 10 cm  Epidural Needle  Injection technique: SIVA saline  Needle type: Tuohy   Needle gauge: 18 G  Needle insertion depth: 7 cm  Location: lumbar (1-5)  Catheter  Catheter type: end hole  Catheter size: 18 G  Catheter at skin depth: 13 cm  Test dose: negative  Assessment  Sensory level: T10  Injection Assessment:  negative aspiration for heme, no pain on injection and no paresthesia on injection

## 2019-03-27 LAB
ALBUMIN SERPL BCP-MCNC: 1.7 G/DL (ref 3.5–5)
ALP SERPL-CCNC: 110 U/L (ref 46–116)
ALT SERPL W P-5'-P-CCNC: 15 U/L (ref 12–78)
ANION GAP SERPL CALCULATED.3IONS-SCNC: 3 MMOL/L (ref 4–13)
AST SERPL W P-5'-P-CCNC: 20 U/L (ref 5–45)
BILIRUB SERPL-MCNC: 0.23 MG/DL (ref 0.2–1)
BUN SERPL-MCNC: 8 MG/DL (ref 5–25)
CALCIUM SERPL-MCNC: 7.8 MG/DL (ref 8.3–10.1)
CHLORIDE SERPL-SCNC: 110 MMOL/L (ref 100–108)
CO2 SERPL-SCNC: 25 MMOL/L (ref 21–32)
CREAT SERPL-MCNC: 0.77 MG/DL (ref 0.6–1.3)
ERYTHROCYTE [DISTWIDTH] IN BLOOD BY AUTOMATED COUNT: 15.7 % (ref 11.6–15.1)
GFR SERPL CREATININE-BSD FRML MDRD: 106 ML/MIN/1.73SQ M
GLUCOSE SERPL-MCNC: 94 MG/DL (ref 65–140)
HCT VFR BLD AUTO: 27.5 % (ref 34.8–46.1)
HGB BLD-MCNC: 9 G/DL (ref 11.5–15.4)
MCH RBC QN AUTO: 30.4 PG (ref 26.8–34.3)
MCHC RBC AUTO-ENTMCNC: 32.7 G/DL (ref 31.4–37.4)
MCV RBC AUTO: 93 FL (ref 82–98)
PLATELET # BLD AUTO: 160 THOUSANDS/UL (ref 149–390)
PMV BLD AUTO: 12 FL (ref 8.9–12.7)
POTASSIUM SERPL-SCNC: 4 MMOL/L (ref 3.5–5.3)
PROT SERPL-MCNC: 4.8 G/DL (ref 6.4–8.2)
RBC # BLD AUTO: 2.96 MILLION/UL (ref 3.81–5.12)
SODIUM SERPL-SCNC: 138 MMOL/L (ref 136–145)
WBC # BLD AUTO: 8.59 THOUSAND/UL (ref 4.31–10.16)

## 2019-03-27 PROCEDURE — 85027 COMPLETE CBC AUTOMATED: CPT | Performed by: OBSTETRICS & GYNECOLOGY

## 2019-03-27 PROCEDURE — 80053 COMPREHEN METABOLIC PANEL: CPT | Performed by: STUDENT IN AN ORGANIZED HEALTH CARE EDUCATION/TRAINING PROGRAM

## 2019-03-27 RX ORDER — OXYCODONE HYDROCHLORIDE AND ACETAMINOPHEN 5; 325 MG/1; MG/1
1 TABLET ORAL EVERY 4 HOURS PRN
Status: DISCONTINUED | OUTPATIENT
Start: 2019-03-27 | End: 2019-03-28

## 2019-03-27 RX ORDER — OXYCODONE HYDROCHLORIDE AND ACETAMINOPHEN 5; 325 MG/1; MG/1
2 TABLET ORAL EVERY 4 HOURS PRN
Status: DISCONTINUED | OUTPATIENT
Start: 2019-03-27 | End: 2019-03-28

## 2019-03-27 RX ADMIN — KETOROLAC TROMETHAMINE 30 MG: 30 INJECTION, SOLUTION INTRAMUSCULAR; INTRAVENOUS at 10:13

## 2019-03-27 RX ADMIN — DOCUSATE SODIUM 100 MG: 100 CAPSULE, LIQUID FILLED ORAL at 08:39

## 2019-03-27 RX ADMIN — ROPIVACAINE HYDROCHLORIDE: 5 INJECTION, SOLUTION EPIDURAL; INFILTRATION; PERINEURAL at 14:27

## 2019-03-27 RX ADMIN — ROPIVACAINE HYDROCHLORIDE: 5 INJECTION, SOLUTION EPIDURAL; INFILTRATION; PERINEURAL at 23:39

## 2019-03-27 RX ADMIN — KETOROLAC TROMETHAMINE 30 MG: 30 INJECTION, SOLUTION INTRAMUSCULAR; INTRAVENOUS at 04:10

## 2019-03-27 RX ADMIN — ROPIVACAINE HYDROCHLORIDE: 5 INJECTION, SOLUTION EPIDURAL; INFILTRATION; PERINEURAL at 04:10

## 2019-03-27 RX ADMIN — SODIUM CHLORIDE, SODIUM LACTATE, POTASSIUM CHLORIDE, AND CALCIUM CHLORIDE 125 ML/HR: .6; .31; .03; .02 INJECTION, SOLUTION INTRAVENOUS at 08:55

## 2019-03-27 RX ADMIN — HYDROMORPHONE HYDROCHLORIDE 1 MG: 1 INJECTION, SOLUTION INTRAMUSCULAR; INTRAVENOUS; SUBCUTANEOUS at 17:52

## 2019-03-27 RX ADMIN — HYDROMORPHONE HYDROCHLORIDE 1 MG: 1 INJECTION, SOLUTION INTRAMUSCULAR; INTRAVENOUS; SUBCUTANEOUS at 22:52

## 2019-03-27 RX ADMIN — DEXTROAMPHETAMINE SACCHARATE, AMPHETAMINE ASPARTATE, DEXTROAMPHETAMINE SULFATE, AND AMPHETAMINE SULFATE 30 MG: 2.5; 2.5; 2.5; 2.5 TABLET ORAL at 08:39

## 2019-03-27 RX ADMIN — METHADONE HYDROCHLORIDE 85 MG: 10 CONCENTRATE ORAL at 08:29

## 2019-03-27 RX ADMIN — HYDROMORPHONE HYDROCHLORIDE 1 MG: 1 INJECTION, SOLUTION INTRAMUSCULAR; INTRAVENOUS; SUBCUTANEOUS at 20:05

## 2019-03-27 RX ADMIN — DOCUSATE SODIUM 100 MG: 100 CAPSULE, LIQUID FILLED ORAL at 17:51

## 2019-03-27 RX ADMIN — DEXTROAMPHETAMINE SACCHARATE, AMPHETAMINE ASPARTATE, DEXTROAMPHETAMINE SULFATE, AND AMPHETAMINE SULFATE 30 MG: 2.5; 2.5; 2.5; 2.5 TABLET ORAL at 13:11

## 2019-03-27 RX ADMIN — HYDROMORPHONE HYDROCHLORIDE 1 MG: 1 INJECTION, SOLUTION INTRAMUSCULAR; INTRAVENOUS; SUBCUTANEOUS at 14:42

## 2019-03-27 RX ADMIN — ENOXAPARIN SODIUM 40 MG: 40 INJECTION SUBCUTANEOUS at 11:25

## 2019-03-28 ENCOUNTER — ANESTHESIA EVENT (INPATIENT)
Dept: LABOR AND DELIVERY | Facility: HOSPITAL | Age: 29
End: 2019-03-28
Payer: COMMERCIAL

## 2019-03-28 ENCOUNTER — ANESTHESIA (INPATIENT)
Dept: LABOR AND DELIVERY | Facility: HOSPITAL | Age: 29
End: 2019-03-28
Payer: COMMERCIAL

## 2019-03-28 ENCOUNTER — DOCUMENTATION (OUTPATIENT)
Dept: PERIOP | Facility: HOSPITAL | Age: 29
End: 2019-03-28

## 2019-03-28 PROCEDURE — C9290 INJ, BUPIVACAINE LIPOSOME: HCPCS | Performed by: ANESTHESIOLOGY

## 2019-03-28 PROCEDURE — 3E0T3BZ INTRODUCTION OF ANESTHETIC AGENT INTO PERIPHERAL NERVES AND PLEXI, PERCUTANEOUS APPROACH: ICD-10-PCS | Performed by: ANESTHESIOLOGY

## 2019-03-28 RX ORDER — MIDAZOLAM HYDROCHLORIDE 1 MG/ML
INJECTION INTRAMUSCULAR; INTRAVENOUS AS NEEDED
Status: DISCONTINUED | OUTPATIENT
Start: 2019-03-28 | End: 2019-03-31 | Stop reason: HOSPADM

## 2019-03-28 RX ORDER — IBUPROFEN 600 MG/1
600 TABLET ORAL EVERY 6 HOURS SCHEDULED
Status: DISCONTINUED | OUTPATIENT
Start: 2019-03-28 | End: 2019-03-30 | Stop reason: HOSPADM

## 2019-03-28 RX ORDER — ACETAMINOPHEN 325 MG/1
650 TABLET ORAL EVERY 6 HOURS SCHEDULED
Status: DISCONTINUED | OUTPATIENT
Start: 2019-03-28 | End: 2019-03-30 | Stop reason: HOSPADM

## 2019-03-28 RX ORDER — METHOCARBAMOL 500 MG/1
250 TABLET, FILM COATED ORAL EVERY 6 HOURS PRN
Status: DISCONTINUED | OUTPATIENT
Start: 2019-03-28 | End: 2019-03-30 | Stop reason: HOSPADM

## 2019-03-28 RX ORDER — MIDAZOLAM HYDROCHLORIDE 1 MG/ML
INJECTION INTRAMUSCULAR; INTRAVENOUS
Status: COMPLETED | OUTPATIENT
Start: 2019-03-28 | End: 2019-03-28

## 2019-03-28 RX ORDER — OXYCODONE HYDROCHLORIDE 10 MG/1
10 TABLET ORAL EVERY 6 HOURS PRN
Status: DISCONTINUED | OUTPATIENT
Start: 2019-03-28 | End: 2019-03-30 | Stop reason: HOSPADM

## 2019-03-28 RX ORDER — MIDAZOLAM HYDROCHLORIDE 1 MG/ML
INJECTION INTRAMUSCULAR; INTRAVENOUS
Status: COMPLETED
Start: 2019-03-28 | End: 2019-03-28

## 2019-03-28 RX ORDER — OXYCODONE HYDROCHLORIDE 5 MG/1
5 TABLET ORAL EVERY 6 HOURS PRN
Status: DISCONTINUED | OUTPATIENT
Start: 2019-03-28 | End: 2019-03-30 | Stop reason: HOSPADM

## 2019-03-28 RX ORDER — BUPIVACAINE HYDROCHLORIDE 2.5 MG/ML
INJECTION, SOLUTION INFILTRATION; PERINEURAL
Status: COMPLETED | OUTPATIENT
Start: 2019-03-28 | End: 2019-03-28

## 2019-03-28 RX ORDER — LIDOCAINE HYDROCHLORIDE 10 MG/ML
INJECTION, SOLUTION INFILTRATION; PERINEURAL
Status: COMPLETED | OUTPATIENT
Start: 2019-03-28 | End: 2019-03-28

## 2019-03-28 RX ADMIN — HYDROMORPHONE HYDROCHLORIDE 1 MG: 1 INJECTION, SOLUTION INTRAMUSCULAR; INTRAVENOUS; SUBCUTANEOUS at 00:55

## 2019-03-28 RX ADMIN — OXYCODONE HYDROCHLORIDE 10 MG: 10 TABLET ORAL at 23:27

## 2019-03-28 RX ADMIN — IBUPROFEN 600 MG: 600 TABLET ORAL at 13:09

## 2019-03-28 RX ADMIN — METHADONE HYDROCHLORIDE 85 MG: 10 CONCENTRATE ORAL at 08:26

## 2019-03-28 RX ADMIN — BUPIVACAINE HYDROCHLORIDE 20 ML: 2.5 INJECTION, SOLUTION INFILTRATION; PERINEURAL at 14:35

## 2019-03-28 RX ADMIN — HYDROMORPHONE HYDROCHLORIDE 1 MG: 1 INJECTION, SOLUTION INTRAMUSCULAR; INTRAVENOUS; SUBCUTANEOUS at 08:21

## 2019-03-28 RX ADMIN — BUPIVACAINE 20 ML: 13.3 INJECTION, SUSPENSION, LIPOSOMAL INFILTRATION at 14:00

## 2019-03-28 RX ADMIN — DOCUSATE SODIUM 100 MG: 100 CAPSULE, LIQUID FILLED ORAL at 08:20

## 2019-03-28 RX ADMIN — OXYCODONE HYDROCHLORIDE 10 MG: 10 TABLET ORAL at 15:33

## 2019-03-28 RX ADMIN — DEXTROAMPHETAMINE SACCHARATE, AMPHETAMINE ASPARTATE, DEXTROAMPHETAMINE SULFATE, AND AMPHETAMINE SULFATE 30 MG: 2.5; 2.5; 2.5; 2.5 TABLET ORAL at 11:35

## 2019-03-28 RX ADMIN — METHOCARBAMOL 250 MG: 500 TABLET, FILM COATED ORAL at 23:29

## 2019-03-28 RX ADMIN — MIDAZOLAM 2 MG: 1 INJECTION INTRAMUSCULAR; INTRAVENOUS at 14:35

## 2019-03-28 RX ADMIN — HYDROMORPHONE HYDROCHLORIDE 1 MG: 1 INJECTION, SOLUTION INTRAMUSCULAR; INTRAVENOUS; SUBCUTANEOUS at 03:07

## 2019-03-28 RX ADMIN — MIDAZOLAM 2 MG: 1 INJECTION INTRAMUSCULAR; INTRAVENOUS at 14:24

## 2019-03-28 RX ADMIN — DEXTROAMPHETAMINE SACCHARATE, AMPHETAMINE ASPARTATE, DEXTROAMPHETAMINE SULFATE, AND AMPHETAMINE SULFATE 30 MG: 2.5; 2.5; 2.5; 2.5 TABLET ORAL at 08:19

## 2019-03-28 RX ADMIN — ACETAMINOPHEN 650 MG: 325 TABLET ORAL at 13:08

## 2019-03-28 RX ADMIN — LIDOCAINE HYDROCHLORIDE 5 ML: 10 INJECTION, SOLUTION INFILTRATION; PERINEURAL at 14:35

## 2019-03-28 RX ADMIN — IBUPROFEN 600 MG: 600 TABLET ORAL at 19:48

## 2019-03-28 RX ADMIN — ACETAMINOPHEN 650 MG: 325 TABLET ORAL at 19:48

## 2019-03-28 RX ADMIN — METHOCARBAMOL 250 MG: 500 TABLET, FILM COATED ORAL at 15:34

## 2019-03-28 NOTE — ADDENDUM NOTE
Addendum  created 03/28/19 1152 by Link Casper MD    Intraprocedure LDAs edited, LDA properties accepted, Order list changed

## 2019-03-28 NOTE — ANESTHESIA PROCEDURE NOTES
Peripheral Block    Patient location during procedure: holding area  Start time: 3/28/2019 2:20 PM  Reason for block: at surgeon's request and post-op pain management  Staffing  Anesthesiologist: Juan Marroquin MD  Performed: anesthesiologist   Preanesthetic Checklist  Completed: patient identified, site marked, surgical consent, pre-op evaluation, timeout performed, IV checked, risks and benefits discussed and monitors and equipment checked  Peripheral Block  Patient position: supine  Prep: ChloraPrep  Patient monitoring: continuous pulse ox, frequent blood pressure checks, cardiac monitor and heart rate  Block type: TAP  Laterality: bilateral  Injection technique: single-shot  Procedures: ultrasound guided  Ultrasound permanent image savedbupivacaine (MARCAINE) 0 25 % perineural infiltration, 20 mL  midazolam (VERSED) 2 mg/2 mL IV, 2 mg  lidocaine (XYLOCAINE) 1 % infiltration, 5 mL  Needle  Needle type: Stimuplex   Needle gauge: 22 G  Needle length: 10 cm  Needle localization: ultrasound guidance  Test dose: negative  Assessment  Injection assessment: incremental injection and local visualized surrounding nerve on ultrasound  Paresthesia pain: none  Post-procedure:  site cleaned  patient tolerated the procedure well with no immediate complications

## 2019-03-29 PROCEDURE — 99024 POSTOP FOLLOW-UP VISIT: CPT | Performed by: OBSTETRICS & GYNECOLOGY

## 2019-03-29 RX ADMIN — DEXTROAMPHETAMINE SACCHARATE, AMPHETAMINE ASPARTATE, DEXTROAMPHETAMINE SULFATE, AND AMPHETAMINE SULFATE 30 MG: 2.5; 2.5; 2.5; 2.5 TABLET ORAL at 08:00

## 2019-03-29 RX ADMIN — ACETAMINOPHEN 650 MG: 325 TABLET ORAL at 08:00

## 2019-03-29 RX ADMIN — IBUPROFEN 600 MG: 600 TABLET ORAL at 08:00

## 2019-03-29 RX ADMIN — DOCUSATE SODIUM 100 MG: 100 CAPSULE, LIQUID FILLED ORAL at 08:00

## 2019-03-29 RX ADMIN — IBUPROFEN 600 MG: 600 TABLET ORAL at 01:55

## 2019-03-29 RX ADMIN — ACETAMINOPHEN 650 MG: 325 TABLET ORAL at 20:14

## 2019-03-29 RX ADMIN — METHOCARBAMOL 250 MG: 500 TABLET, FILM COATED ORAL at 18:43

## 2019-03-29 RX ADMIN — OXYCODONE HYDROCHLORIDE 10 MG: 10 TABLET ORAL at 19:43

## 2019-03-29 RX ADMIN — OXYCODONE HYDROCHLORIDE 10 MG: 10 TABLET ORAL at 13:19

## 2019-03-29 RX ADMIN — METHADONE HYDROCHLORIDE 85 MG: 10 CONCENTRATE ORAL at 08:01

## 2019-03-29 RX ADMIN — METHOCARBAMOL 250 MG: 500 TABLET, FILM COATED ORAL at 12:14

## 2019-03-29 RX ADMIN — IBUPROFEN 600 MG: 600 TABLET ORAL at 14:01

## 2019-03-29 RX ADMIN — IBUPROFEN 600 MG: 600 TABLET ORAL at 20:14

## 2019-03-29 RX ADMIN — ACETAMINOPHEN 650 MG: 325 TABLET ORAL at 14:01

## 2019-03-29 RX ADMIN — DOCUSATE SODIUM 100 MG: 100 CAPSULE, LIQUID FILLED ORAL at 18:43

## 2019-03-29 RX ADMIN — ACETAMINOPHEN 650 MG: 325 TABLET ORAL at 01:56

## 2019-03-29 RX ADMIN — DEXTROAMPHETAMINE SACCHARATE, AMPHETAMINE ASPARTATE, DEXTROAMPHETAMINE SULFATE, AND AMPHETAMINE SULFATE 30 MG: 2.5; 2.5; 2.5; 2.5 TABLET ORAL at 13:18

## 2019-03-30 VITALS
SYSTOLIC BLOOD PRESSURE: 136 MMHG | DIASTOLIC BLOOD PRESSURE: 86 MMHG | BODY MASS INDEX: 34.49 KG/M2 | HEART RATE: 91 BPM | TEMPERATURE: 98.2 F | RESPIRATION RATE: 16 BRPM | OXYGEN SATURATION: 100 % | WEIGHT: 202 LBS | HEIGHT: 64 IN

## 2019-03-30 PROCEDURE — 99024 POSTOP FOLLOW-UP VISIT: CPT | Performed by: OBSTETRICS & GYNECOLOGY

## 2019-03-30 RX ORDER — DOCUSATE SODIUM 100 MG/1
100 CAPSULE, LIQUID FILLED ORAL 2 TIMES DAILY
Qty: 10 CAPSULE | Refills: 0 | Status: SHIPPED | OUTPATIENT
Start: 2019-03-30 | End: 2019-04-08

## 2019-03-30 RX ORDER — IBUPROFEN 600 MG/1
600 TABLET ORAL EVERY 6 HOURS SCHEDULED
Qty: 30 TABLET | Refills: 0 | Status: SHIPPED | OUTPATIENT
Start: 2019-03-30 | End: 2019-12-06 | Stop reason: ALTCHOICE

## 2019-03-30 RX ADMIN — METHADONE HYDROCHLORIDE 85 MG: 10 CONCENTRATE ORAL at 08:17

## 2019-03-30 RX ADMIN — DOCUSATE SODIUM 100 MG: 100 CAPSULE, LIQUID FILLED ORAL at 08:04

## 2019-03-30 RX ADMIN — OXYCODONE HYDROCHLORIDE 10 MG: 10 TABLET ORAL at 01:55

## 2019-03-30 RX ADMIN — IBUPROFEN 600 MG: 600 TABLET ORAL at 08:04

## 2019-03-30 RX ADMIN — ACETAMINOPHEN 650 MG: 325 TABLET ORAL at 01:54

## 2019-03-30 RX ADMIN — IBUPROFEN 600 MG: 600 TABLET ORAL at 01:54

## 2019-03-30 RX ADMIN — DEXTROAMPHETAMINE SACCHARATE, AMPHETAMINE ASPARTATE, DEXTROAMPHETAMINE SULFATE, AND AMPHETAMINE SULFATE 30 MG: 2.5; 2.5; 2.5; 2.5 TABLET ORAL at 08:04

## 2019-03-30 RX ADMIN — ACETAMINOPHEN 650 MG: 325 TABLET ORAL at 08:04

## 2019-04-04 LAB — PLACENTA IN STORAGE: NORMAL

## 2019-04-08 ENCOUNTER — POSTPARTUM VISIT (OUTPATIENT)
Dept: OBGYN CLINIC | Facility: CLINIC | Age: 29
End: 2019-04-08

## 2019-04-08 VITALS — BODY MASS INDEX: 30.38 KG/M2 | WEIGHT: 177 LBS | SYSTOLIC BLOOD PRESSURE: 122 MMHG | DIASTOLIC BLOOD PRESSURE: 74 MMHG

## 2019-04-08 DIAGNOSIS — Z48.89 POSTOPERATIVE VISIT: Primary | ICD-10-CM

## 2019-04-08 DIAGNOSIS — Z98.891 S/P CESAREAN SECTION: ICD-10-CM

## 2019-04-08 PROCEDURE — 99024 POSTOP FOLLOW-UP VISIT: CPT | Performed by: OBSTETRICS & GYNECOLOGY

## 2019-04-08 RX ORDER — HYDROXYZINE HYDROCHLORIDE 25 MG/1
25 TABLET, FILM COATED ORAL EVERY 6 HOURS PRN
COMMUNITY
End: 2019-05-15

## 2019-04-11 ENCOUNTER — ROUTINE PRENATAL (OUTPATIENT)
Dept: OBGYN CLINIC | Facility: CLINIC | Age: 29
End: 2019-04-11

## 2019-04-11 DIAGNOSIS — Z34.90 PREGNANCY, UNSPECIFIED GESTATIONAL AGE: Primary | ICD-10-CM

## 2019-04-16 ENCOUNTER — TELEPHONE (OUTPATIENT)
Dept: OBGYN CLINIC | Facility: CLINIC | Age: 29
End: 2019-04-16

## 2019-05-15 ENCOUNTER — POSTPARTUM VISIT (OUTPATIENT)
Dept: OBGYN CLINIC | Facility: CLINIC | Age: 29
End: 2019-05-15

## 2019-05-15 VITALS — BODY MASS INDEX: 31.76 KG/M2 | SYSTOLIC BLOOD PRESSURE: 128 MMHG | DIASTOLIC BLOOD PRESSURE: 76 MMHG | WEIGHT: 185 LBS

## 2019-05-15 DIAGNOSIS — IMO0001 CONTRACEPTION: ICD-10-CM

## 2019-05-15 DIAGNOSIS — Z98.891 S/P CESAREAN SECTION: ICD-10-CM

## 2019-05-15 PROBLEM — O36.5930 POOR FETAL GROWTH AFFECTING MANAGEMENT OF MOTHER IN THIRD TRIMESTER: Status: RESOLVED | Noted: 2019-01-04 | Resolved: 2019-05-15

## 2019-05-15 PROBLEM — O35.09X0 PREGNANCY COMPLICATED BY FETAL CEREBRAL VENTRICULOMEGALY, NOT APPLICABLE OR UNSPECIFIED FETUS: Status: RESOLVED | Noted: 2019-01-04 | Resolved: 2019-05-15

## 2019-05-15 PROBLEM — Z3A.36 36 WEEKS GESTATION OF PREGNANCY: Status: RESOLVED | Noted: 2018-12-06 | Resolved: 2019-05-15

## 2019-05-15 PROBLEM — O99.323 METHADONE MAINTENANCE TREATMENT AFFECTING PREGNANCY IN THIRD TRIMESTER (HCC): Status: RESOLVED | Noted: 2019-01-04 | Resolved: 2019-05-15

## 2019-05-15 PROBLEM — Z22.330 GBS CARRIER: Status: RESOLVED | Noted: 2019-03-14 | Resolved: 2019-05-15

## 2019-05-15 PROBLEM — F11.20 METHADONE MAINTENANCE TREATMENT AFFECTING PREGNANCY IN THIRD TRIMESTER (HCC): Status: RESOLVED | Noted: 2019-01-04 | Resolved: 2019-05-15

## 2019-05-15 PROBLEM — O35.0XX0 PREGNANCY COMPLICATED BY FETAL CEREBRAL VENTRICULOMEGALY, NOT APPLICABLE OR UNSPECIFIED FETUS: Status: RESOLVED | Noted: 2019-01-04 | Resolved: 2019-05-15

## 2019-05-15 PROBLEM — O14.93 PREECLAMPSIA, THIRD TRIMESTER: Status: RESOLVED | Noted: 2019-03-14 | Resolved: 2019-05-15

## 2019-05-15 PROBLEM — O35.9XX0 TERATOGEN EXPOSURE IN CURRENT PREGNANCY: Status: RESOLVED | Noted: 2018-11-06 | Resolved: 2019-05-15

## 2019-05-15 PROCEDURE — 99024 POSTOP FOLLOW-UP VISIT: CPT | Performed by: OBSTETRICS & GYNECOLOGY

## 2019-05-15 RX ORDER — NORETHINDRONE ACETATE AND ETHINYL ESTRADIOL 1MG-20(21)
1 KIT ORAL DAILY
Qty: 84 TABLET | Refills: 3 | Status: SHIPPED | OUTPATIENT
Start: 2019-05-15 | End: 2019-12-06 | Stop reason: ALTCHOICE

## 2019-05-15 RX ORDER — ATOMOXETINE 40 MG/1
40 CAPSULE ORAL DAILY
COMMUNITY
End: 2019-12-06 | Stop reason: ALTCHOICE

## 2019-11-27 DIAGNOSIS — Z34.90 PREGNANCY, UNSPECIFIED GESTATIONAL AGE: Primary | ICD-10-CM

## 2019-11-27 DIAGNOSIS — F19.20 DRUG ABUSE AND DEPENDENCE (HCC): ICD-10-CM

## 2019-12-06 ENCOUNTER — INITIAL PRENATAL (OUTPATIENT)
Dept: OBGYN CLINIC | Facility: CLINIC | Age: 29
End: 2019-12-06

## 2019-12-06 ENCOUNTER — TELEPHONE (OUTPATIENT)
Dept: OBGYN CLINIC | Facility: CLINIC | Age: 29
End: 2019-12-06

## 2019-12-06 VITALS
HEIGHT: 64 IN | RESPIRATION RATE: 16 BRPM | SYSTOLIC BLOOD PRESSURE: 130 MMHG | WEIGHT: 189 LBS | BODY MASS INDEX: 32.27 KG/M2 | HEART RATE: 92 BPM | DIASTOLIC BLOOD PRESSURE: 86 MMHG

## 2019-12-06 DIAGNOSIS — O09.892 SHORT INTERVAL BETWEEN PREGNANCIES AFFECTING PREGNANCY IN SECOND TRIMESTER, ANTEPARTUM: ICD-10-CM

## 2019-12-06 DIAGNOSIS — F19.11 HISTORY OF DRUG ABUSE (HCC): ICD-10-CM

## 2019-12-06 DIAGNOSIS — O09.299 HISTORY OF PRE-ECLAMPSIA IN PRIOR PREGNANCY, CURRENTLY PREGNANT: ICD-10-CM

## 2019-12-06 DIAGNOSIS — O09.32 LATE PRENATAL CARE AFFECTING PREGNANCY IN SECOND TRIMESTER: ICD-10-CM

## 2019-12-06 DIAGNOSIS — O34.219 HISTORY OF CESAREAN DELIVERY, CURRENTLY PREGNANT: ICD-10-CM

## 2019-12-06 DIAGNOSIS — Z34.81 ENCOUNTER FOR SUPERVISION OF OTHER NORMAL PREGNANCY IN FIRST TRIMESTER: Primary | ICD-10-CM

## 2019-12-06 DIAGNOSIS — O09.299 HISTORY OF IUGR (INTRAUTERINE GROWTH RETARDATION) AND STILLBIRTH, CURRENTLY PREGNANT: ICD-10-CM

## 2019-12-06 PROCEDURE — OBC: Performed by: OBSTETRICS & GYNECOLOGY

## 2019-12-06 RX ORDER — CLONAZEPAM 1 MG/1
0.25 TABLET ORAL EVERY OTHER DAY
Refills: 0 | COMMUNITY
Start: 2019-11-10 | End: 2019-12-06 | Stop reason: SDUPTHER

## 2019-12-06 NOTE — PROGRESS NOTES
OB Intake    Patient presents for OB intake interview  Late prenatal care  Accompanied by: baby  Unplanned pregnancy, but welcomed FOB involved and supportive  [id number]    First pregnancy baby has stroke in uterus  Pt was advised by baby's neurologist to see Hematologist to see if she has any medical conditions that could cause again  Hx of  delivery prior to 36 weeks 6 days: no  Hx of hypertension:  yes - previous pregnancy advised to start LD  mg daily  Patient's last menstrual period was 2019  Signs and Symptoms of pregnancy:  - fatigue and positive home pregnancy test  - Constipation: no  - Headaches: no  - Cramping/spotting: no  - PICA cravings: no  - Diabetes: If you answer yes, please order 1 hr gtt testing, 50grams   History of gestational diabetes no   BMI >35  no   Advance maternal age >35 no   First degree relative with type 2 diabetes no   History of PCOS no   Current metformin use no   Prior history of macrosomia or LGA no    Prenatal labs including: prenatal panel, varicella, CMP, PrCr ratio, UDS and MFM referral   Last Pap:  18 Negative pap, negative HPV  Tdap - counseled to be given after 27 weeks  Influenza vaccine discussed and information sheet given  Vaccinated: no  Immunization Record  Immunization History   Administered Date(s) Administered    DTaP 5 1991, 1991, 1991, 1992    HPV Quadrivalent 2013    Hep B, adult 1997, 1997, 10/13/1997    Hib (HbOC) 1991, 1991, 1991, 1992    IPV 1991, 1991, 1992, 1998    Influenza TIV (IM) 09/10/2009, 01/15/2013    MMR 1992, 1998    Meningococcal, Unknown Serogroups 09/10/2009    Td (adult), adsorbed 2005    Tdap 2019    Tuberculin Skin Test-PPD Intradermal 2013     Hx of MRSA: no  Dental visit with last 6 months - no  If no, recommendations discussed     Negative for depression with PHQ2 score of 0   Interview education:  Daphne Gonzalez Pregnancy Essentials booklet given to patient  Reviewed and explained  Handouts given at today's visit     [address] [email or web address] [facility] phone application guide   [facility]   CDCs Response to [address] [place] [address] Maternal Fetal Medicine        Sequential screening pamphlet                   Cystic fibrosis information    MyChart activated (not 1518 years of age)  - Yes  - Code provided: No    [facility]: No  ONAF form submitted: Yes      Interview done by : Bryanna Mcclain RN       12/06/19

## 2019-12-06 NOTE — TELEPHONE ENCOUNTER
Pt here today for OB intake approx 22wks hx preeclampsia  Labs ordered  Advised to start LD  mg daily  First pregnancy baby has stroke in uterus  Pt was advised by baby's neurologist to see Hematologist to see if she has any medical conditions that could cause again  Please review patient meds  Routing to provider for advice

## 2019-12-13 PROBLEM — O09.899 SHORT INTERVAL BETWEEN PREGNANCIES AFFECTING PREGNANCY, ANTEPARTUM: Status: ACTIVE | Noted: 2019-12-13

## 2019-12-17 ENCOUNTER — TELEPHONE (OUTPATIENT)
Dept: PERINATAL CARE | Facility: CLINIC | Age: 29
End: 2019-12-17

## 2019-12-17 NOTE — TELEPHONE ENCOUNTER
Patient called to cancel appointment for Detailed U/S  Patient was informed that the  would be notified and she would be contacted within the next few days once an appointment is made available  Patient said that she would call her OB to let them know that she missed today's appointment  Patient is due April 7th

## 2019-12-18 ENCOUNTER — TELEPHONE (OUTPATIENT)
Dept: PERINATAL CARE | Facility: CLINIC | Age: 29
End: 2019-12-18

## 2019-12-18 ENCOUNTER — ROUTINE PRENATAL (OUTPATIENT)
Dept: PERINATAL CARE | Facility: CLINIC | Age: 29
End: 2019-12-18
Payer: COMMERCIAL

## 2019-12-18 VITALS
HEART RATE: 92 BPM | WEIGHT: 194 LBS | BODY MASS INDEX: 32.32 KG/M2 | DIASTOLIC BLOOD PRESSURE: 62 MMHG | HEIGHT: 65 IN | SYSTOLIC BLOOD PRESSURE: 108 MMHG

## 2019-12-18 DIAGNOSIS — O34.211 MATERNAL CARE DUE TO LOW TRANSVERSE UTERINE SCAR FROM PREVIOUS CESAREAN DELIVERY: ICD-10-CM

## 2019-12-18 DIAGNOSIS — O09.899 SHORT INTERVAL BETWEEN PREGNANCIES AFFECTING PREGNANCY, ANTEPARTUM: ICD-10-CM

## 2019-12-18 DIAGNOSIS — F19.11 HISTORY OF DRUG ABUSE (HCC): ICD-10-CM

## 2019-12-18 DIAGNOSIS — Z34.81 ENCOUNTER FOR SUPERVISION OF OTHER NORMAL PREGNANCY IN FIRST TRIMESTER: ICD-10-CM

## 2019-12-18 DIAGNOSIS — I63.9 STROKE IN UTERO (HCC): ICD-10-CM

## 2019-12-18 DIAGNOSIS — O09.892 SHORT INTERVAL BETWEEN PREGNANCIES AFFECTING PREGNANCY IN SECOND TRIMESTER, ANTEPARTUM: ICD-10-CM

## 2019-12-18 DIAGNOSIS — O09.299 HISTORY OF PRE-ECLAMPSIA IN PRIOR PREGNANCY, CURRENTLY PREGNANT: ICD-10-CM

## 2019-12-18 DIAGNOSIS — O34.219 HISTORY OF CESAREAN DELIVERY, CURRENTLY PREGNANT: ICD-10-CM

## 2019-12-18 DIAGNOSIS — O09.32 LATE PRENATAL CARE AFFECTING PREGNANCY IN SECOND TRIMESTER: ICD-10-CM

## 2019-12-18 DIAGNOSIS — O09.299 HISTORY OF IUGR (INTRAUTERINE GROWTH RETARDATION) AND STILLBIRTH, CURRENTLY PREGNANT: ICD-10-CM

## 2019-12-18 DIAGNOSIS — O09.892 PRIOR PREGNANCY COMPLICATED BY PIH, ANTEPARTUM, SECOND TRIMESTER: ICD-10-CM

## 2019-12-18 DIAGNOSIS — O35.2XX0 HEREDITARY DISEASE IN FAMILY POSSIBLY AFFECTING FETUS, AFFECTING MANAGEMENT OF MOTHER IN PREGNANCY, SINGLE OR UNSPECIFIED FETUS: ICD-10-CM

## 2019-12-18 DIAGNOSIS — Z3A.24 24 WEEKS GESTATION OF PREGNANCY: Primary | ICD-10-CM

## 2019-12-18 DIAGNOSIS — O09.292 PRIOR PREGNANCY COMPLICATED BY IUGR, ANTEPARTUM, SECOND TRIMESTER: ICD-10-CM

## 2019-12-18 PROCEDURE — 76811 OB US DETAILED SNGL FETUS: CPT | Performed by: OBSTETRICS & GYNECOLOGY

## 2019-12-18 PROCEDURE — 99214 OFFICE O/P EST MOD 30 MIN: CPT | Performed by: OBSTETRICS & GYNECOLOGY

## 2019-12-18 RX ORDER — CETIRIZINE HYDROCHLORIDE 10 MG/1
10 TABLET ORAL DAILY
COMMUNITY

## 2019-12-18 RX ORDER — ASPIRIN 81 MG/1
162 TABLET ORAL DAILY
Qty: 180 TABLET | Refills: 3 | Status: SHIPPED | OUTPATIENT
Start: 2019-12-18 | End: 2020-04-03 | Stop reason: HOSPADM

## 2019-12-18 NOTE — TELEPHONE ENCOUNTER
I called Melissa Bangura to confirm appointment on 12/18/19 at [facility]  I asked her to arrive at [phone] on 12/18/19 to register for her noon ultrasound  I left a message she could bring one support person and no children to this appointment

## 2019-12-18 NOTE — PROGRESS NOTES
Thank you very much for referring Piyush Marin back to the [place] [address] for late prenatal care  She is well known to me from her prior pregnancy which was complicated by a failed medical  with exposure of methotrexate  During that pregnancy starting at around 20 weeks, there was concern for abnormal intracranial anatomy  There was early ventriculomegaly that ultimately resolved during the pregnancy however there was other issues related to significant fetal growth restriction  Piyush Marin eventually developed preeclampsia and was recommended to be induced at 36 weeks and 5 days  There was intolerance of labor and she had a  section  Her daughter weighed 4 lb 13 oz  Her daughter was diagnosed with an in utero stroke and was diagnosed with focal areas of deep left frontal and right posterior temporal periventricular acute infarction  She continued to have mild ventriculomegaly  The corpus callosum appeared thin and difficult to assess on sagittal imaging during an MRI shortly after birth  Follow-up imaging has not yet been performed  Her daughter is currently doing well although she has early intervention  She follows with Neurology and has no significant deficits at this time  Had genetic testing including an a karyotype with normal results  Micro array testing appears to be planned next  She has been referred to Genetics but has not yet had a appointment  Was apparently referred to Hematology secondary to in utero stroke  On exam today Piyush Marin appears well, in no acute distress, and denies any complaints  Her abdomen is non-tender  The fetal anatomic survey is complete  There is no sonographic evidence of fetal abnormalities at this time  Intracranial anatomy is normal and biometry is proportional   Good fetal movement and tone are seen  The amniotic fluid volume appears normal    The patient was informed of today's findings and all of her questions were answered  The limitations of ultrasound were reviewed  Methadone is considered the standard of care when treating opioid addition in pregnant women  Women receiving methadone for the treatment of addiction should be maintained on their daily dose of methadone in addition to receiving the same pain management options during labor and delivery as opioid-naïve women; maintenance doses of methadone will not provide adequate pain relief  Narcotic agonist-antagonists such as Stadol should be avoided for the treatment of labor pain in women maintained on methadone due to the risk of precipitating acute withdrawal   Data is available related to fetal/ outcomes following maternal use of methadone during pregnancy  Information collected by the [facility]  [facility]  is complicated by maternal use of illicit drugs, nutrition, infection, and psychosocial circumstances  However, pregnant women in methadone treatment programs are reported to have improved fetal outcomes compared to pregnant women using illicit drugs  Fetal growth, birth weight, length, and/or head circumference may be decreased in infants born to opioid-addicted mothers treated with methadone during pregnancy  Growth deficits do not appear to persist; however, decreased performance on psychometric and behavioral tests has been found to continue into childhood  Abnormal fetal nonstress tests have also been reported  Withdrawal symptoms in the  may be observed up to 2-4 weeks after delivery and should be expected (ACOG, 2012)   abstinence syndrome following opioid exposure may present with autonomic (eg, fever, temperature instability), gastrointestinal (eg, diarrhea, vomiting, poor feeding/weight gain), or neurologic (eg, high-pitched crying, increased muscle tone, irritability, seizure, tremor) symptoms        Methadone clearance in pregnant women is increased and half-life is decreased during the 2nd and 3rd trimesters of pregnancy; the dosage of methadone may need increased or dosing interval decreased during pregnancy to avoid withdrawal symptoms in the mother  Dosage may need decreased following delivery (ACOG, 2012)  I recommend ensuring pediatrics and neonatology is made aware of the patient's methadone use and should be on the on the lookout for  abstinence syndrome  Consider an antepartum  consultation  Toxicology screening is pending  Given the patient's history of prior preeclampsia, I recommend initiating low dose aspirin therapy  A recent meta-analysis yielded risk reductions of 24% for preeclampsia, 20% for intrauterine growth restriction, and 14% for  birth, with an absolute risk reduction of 2-5% for preeclampsia, one to 5% for intrauterine growth restriction, and 2-4% for  birth  In this study, there was no identified risk of harm to the mother or fetus but long-term evidence was somewhat limited  Given the overall safety profile and risk-benefit analysis, I recommend 162 mg of aspirin be taken Daily until delivery  I reviewed these recommendations with the patient and answered all of her questions to apparent satisfaction  We discussed the patient's prior pregnancy which was complicated by an in utero stroke  The timing of that stroke is unknown however the patient states that she was told perhaps occurred 4 weeks prior to delivery  She developed preeclampsia and we discussed that preeclampsia is an uncommon cause for in utero stroke  She was recommended for a thrombophilia workup  I provided her with a prescription to complete those studies although I feel there likely low yield  I discussed with her that I feel that her first-trimester exposure to methotrexate may have contributed to the in utero issues that were seen last pregnancy given that my interpretation of the MRI appears that there was some diffuse infarction    The patient also had a suicide attempt during her last pregnancy utilizing benzodiazepines  She states that she is no longer taking and any benzodiazepines  A PDMP review indicates that she received a 30 day supply of benzodiazepines approximately 6 weeks ago  She states that she self weaned and is no longer taking these medications  Close interval follow-up is recommended given the patient's history  The patient does appear to slow her speech at times and as I remember she did so during her last pregnancy as well  She does take methadone 43 mg daily  Continuation of that dose is recommended as outlined above  We discussed follow-up in detail, and I recommend Alphonso Busby return in 4-6 weeks for a fetal growth evaluation  Please note, in addition to the time spent discussing the results of the ultrasound, I spent approximately 30 minutes of face-to-face time with the patient, greater than 50% of which was spent in counseling and the coordination of care for this patient  Thank you very much for allowing us to participate in the care of this very nice patient  Should you have any questions, please do not hesitate to contact our office

## 2019-12-18 NOTE — LETTER
2019     [address] [facility] MD [address]  [address]  [facility]    Patient: Antonia Montemayor   YOB: 1990   Date of Visit: 2019       Dear Dr Getachew Davis: Thank you for referring Diomedes Sinclair to me for evaluation  Below are my notes for this consultation  If you have questions, please do not hesitate to call me  I look forward to following your patient along with you  Sincerely,        Aden Rodriguez MD        CC: No Recipients  Aden Rodriguez MD  2019  1:51 PM  Sign at close encounter  Thank you very much for referring Boom Peña back to the [place] [address] for late prenatal care  She is well known to me from her prior pregnancy which was complicated by a failed medical  with exposure of methotrexate  During that pregnancy starting at around 20 weeks, there was concern for abnormal intracranial anatomy  There was early ventriculomegaly that ultimately resolved during the pregnancy however there was other issues related to significant fetal growth restriction  Boom Peña eventually developed preeclampsia and was recommended to be induced at 36 weeks and 5 days  There was intolerance of labor and she had a  section  Her daughter weighed 4 lb 13 oz  Her daughter was diagnosed with an in utero stroke and was diagnosed with focal areas of deep left frontal and right posterior temporal periventricular acute infarction  She continued to have mild ventriculomegaly  The corpus callosum appeared thin and difficult to assess on sagittal imaging during an MRI shortly after birth  Follow-up imaging has not yet been performed  Her daughter is currently doing well although she has early intervention  She follows with Neurology and has no significant deficits at this time  Had genetic testing including an a karyotype with normal results  Micro array testing appears to be planned next    She has been referred to Genetics but has not yet had a appointment  Was apparently referred to Hematology secondary to in utero stroke  On exam today Glae Lobato appears well, in no acute distress, and denies any complaints  Her abdomen is non-tender  The fetal anatomic survey is complete  There is no sonographic evidence of fetal abnormalities at this time  Intracranial anatomy is normal and biometry is proportional   Good fetal movement and tone are seen  The amniotic fluid volume appears normal    The patient was informed of today's findings and all of her questions were answered  The limitations of ultrasound were reviewed  Methadone is considered the standard of care when treating opioid addition in pregnant women  Women receiving methadone for the treatment of addiction should be maintained on their daily dose of methadone in addition to receiving the same pain management options during labor and delivery as opioid-naïve women; maintenance doses of methadone will not provide adequate pain relief  Narcotic agonist-antagonists such as Stadol should be avoided for the treatment of labor pain in women maintained on methadone due to the risk of precipitating acute withdrawal   Data is available related to fetal/ outcomes following maternal use of methadone during pregnancy  Information collected by the [place] [facility]  [facility]  is complicated by maternal use of illicit drugs, nutrition, infection, and psychosocial circumstances  However, pregnant women in methadone treatment programs are reported to have improved fetal outcomes compared to pregnant women using illicit drugs  Fetal growth, birth weight, length, and/or head circumference may be decreased in infants born to opioid-addicted mothers treated with methadone during pregnancy  Growth deficits do not appear to persist; however, decreased performance on psychometric and behavioral tests has been found to continue into childhood   Abnormal fetal nonstress tests have also been reported  Withdrawal symptoms in the  may be observed up to 2-4 weeks after delivery and should be expected (ACOG, 2012)   abstinence syndrome following opioid exposure may present with autonomic (eg, fever, temperature instability), gastrointestinal (eg, diarrhea, vomiting, poor feeding/weight gain), or neurologic (eg, high-pitched crying, increased muscle tone, irritability, seizure, tremor) symptoms  Methadone clearance in pregnant women is increased and half-life is decreased during the 2nd and 3rd trimesters of pregnancy; the dosage of methadone may need increased or dosing interval decreased during pregnancy to avoid withdrawal symptoms in the mother  Dosage may need decreased following delivery (ACOG, 2012)  I recommend ensuring pediatrics and neonatology is made aware of the patient's methadone use and should be on the on the lookout for  abstinence syndrome  Consider an antepartum  consultation  Toxicology screening is pending  Given the patient's history of prior preeclampsia, I recommend initiating low dose aspirin therapy  A recent meta-analysis yielded risk reductions of 24% for preeclampsia, 20% for intrauterine growth restriction, and 14% for  birth, with an absolute risk reduction of 2-5% for preeclampsia, one to 5% for intrauterine growth restriction, and 2-4% for  birth  In this study, there was no identified risk of harm to the mother or fetus but long-term evidence was somewhat limited  Given the overall safety profile and risk-benefit analysis, I recommend 162 mg of aspirin be taken Daily until delivery  I reviewed these recommendations with the patient and answered all of her questions to apparent satisfaction  We discussed the patient's prior pregnancy which was complicated by an in utero stroke    The timing of that stroke is unknown however the patient states that she was told perhaps occurred 4 weeks prior to delivery  She developed preeclampsia and we discussed that preeclampsia is an uncommon cause for in utero stroke  She was recommended for a thrombophilia workup  I provided her with a prescription to complete those studies although I feel there likely low yield  I discussed with her that I feel that her first-trimester exposure to methotrexate may have contributed to the in utero issues that were seen last pregnancy given that my interpretation of the MRI appears that there was some diffuse infarction  The patient also had a suicide attempt during her last pregnancy utilizing benzodiazepines  She states that she is no longer taking and any benzodiazepines  A PDMP review indicates that she received a 30 day supply of benzodiazepines approximately 6 weeks ago  She states that she self weaned and is no longer taking these medications  Close interval follow-up is recommended given the patient's history  The patient does appear to slow her speech at times and as I remember she did so during her last pregnancy as well  She does take methadone 43 mg daily  Continuation of that dose is recommended as outlined above  We discussed follow-up in detail, and I recommend Marisol Johns return in 4-6 weeks for a fetal growth evaluation  Please note, in addition to the time spent discussing the results of the ultrasound, I spent approximately 30 minutes of face-to-face time with the patient, greater than 50% of which was spent in counseling and the coordination of care for this patient  Thank you very much for allowing us to participate in the care of this very nice patient  Should you have any questions, please do not hesitate to contact our office

## 2020-01-17 ENCOUNTER — APPOINTMENT (OUTPATIENT)
Dept: LAB | Facility: CLINIC | Age: 30
End: 2020-01-17
Payer: COMMERCIAL

## 2020-01-17 ENCOUNTER — TRANSCRIBE ORDERS (OUTPATIENT)
Dept: LAB | Facility: CLINIC | Age: 30
End: 2020-01-17

## 2020-01-17 PROCEDURE — 85301 ANTITHROMBIN III ANTIGEN: CPT | Performed by: OBSTETRICS & GYNECOLOGY

## 2020-01-17 PROCEDURE — 36415 COLL VENOUS BLD VENIPUNCTURE: CPT | Performed by: OBSTETRICS & GYNECOLOGY

## 2020-01-17 PROCEDURE — 81240 F2 GENE: CPT | Performed by: OBSTETRICS & GYNECOLOGY

## 2020-01-17 PROCEDURE — 86146 BETA-2 GLYCOPROTEIN ANTIBODY: CPT | Performed by: OBSTETRICS & GYNECOLOGY

## 2020-01-17 PROCEDURE — 81241 F5 GENE: CPT | Performed by: OBSTETRICS & GYNECOLOGY

## 2020-01-19 LAB — AT III AG ACT/NOR PPP IA: 77 % (ref 72–124)

## 2020-01-20 ENCOUNTER — TELEPHONE (OUTPATIENT)
Dept: PERINATAL CARE | Facility: OTHER | Age: 30
End: 2020-01-20

## 2020-01-20 LAB
B2 GLYCOPROT1 IGA SER-ACNC: <9 GPI IGA UNITS (ref 0–25)
B2 GLYCOPROT1 IGG SER-ACNC: <9 GPI IGG UNITS (ref 0–20)
B2 GLYCOPROT1 IGM SER-ACNC: <9 GPI IGM UNITS (ref 0–32)

## 2020-01-20 NOTE — TELEPHONE ENCOUNTER
Spoke with Sahra Flood and gave her normal antigen 3 antibody results per Dr Marquis Berry has no questions

## 2020-01-20 NOTE — TELEPHONE ENCOUNTER
----- Message from González Deleon MD sent at 1/20/2020  9:40 AM EST -----  I have reviewed the labs which are normal in pregnancy  Please contact the patient and notify her of the normal results if she has not reviewed them in [address]     Thank you

## 2020-01-21 ENCOUNTER — TELEPHONE (OUTPATIENT)
Dept: OBGYN CLINIC | Facility: CLINIC | Age: 30
End: 2020-01-21

## 2020-01-21 ENCOUNTER — TELEPHONE (OUTPATIENT)
Dept: PERINATAL CARE | Facility: OTHER | Age: 30
End: 2020-01-21

## 2020-01-21 NOTE — TELEPHONE ENCOUNTER
----- Message from Ned Green MD sent at 1/21/2020 12:02 AM EST -----  I have reviewed the labs which are normal in pregnancy  Please contact the patient and notify her of the normal results if she has not reviewed them in [address]     Thank you

## 2020-01-22 LAB — F5 GENE MUT ANL BLD/T: NORMAL

## 2020-01-23 LAB — F2 GENE MUT ANL BLD/T: NORMAL

## 2020-01-27 PROBLEM — Z3A.29 29 WEEKS GESTATION OF PREGNANCY: Status: ACTIVE | Noted: 2019-12-18

## 2020-01-27 PROBLEM — O09.293 PRIOR PREGNANCY COMPLICATED BY IUGR, ANTEPARTUM, THIRD TRIMESTER: Status: ACTIVE | Noted: 2019-12-18

## 2020-01-27 NOTE — PROGRESS NOTES
The patient was seen today for an ultrasound  Please see ultrasound report (located under Ob Procedures) for additional details  Thank you very much for allowing us to participate in the care of this very nice patient  Should you have any questions, please do not hesitate to contact me  Dante Lawson MD [phone]  Attending Physician, Gerard

## 2020-01-28 ENCOUNTER — ULTRASOUND (OUTPATIENT)
Dept: PERINATAL CARE | Facility: OTHER | Age: 30
End: 2020-01-28
Payer: COMMERCIAL

## 2020-01-28 VITALS
SYSTOLIC BLOOD PRESSURE: 114 MMHG | DIASTOLIC BLOOD PRESSURE: 72 MMHG | HEIGHT: 65 IN | WEIGHT: 191.8 LBS | HEART RATE: 108 BPM | BODY MASS INDEX: 31.96 KG/M2

## 2020-01-28 DIAGNOSIS — Z3A.30 30 WEEKS GESTATION OF PREGNANCY: ICD-10-CM

## 2020-01-28 DIAGNOSIS — O09.293 PRIOR PREGNANCY COMPLICATED BY IUGR, ANTEPARTUM, THIRD TRIMESTER: Primary | ICD-10-CM

## 2020-01-28 PROBLEM — O09.893 SHORT INTERVAL BETWEEN PREGNANCIES AFFECTING PREGNANCY IN THIRD TRIMESTER, ANTEPARTUM: Status: ACTIVE | Noted: 2019-12-18

## 2020-01-28 PROCEDURE — 99212 OFFICE O/P EST SF 10 MIN: CPT | Performed by: OBSTETRICS & GYNECOLOGY

## 2020-01-28 PROCEDURE — 76816 OB US FOLLOW-UP PER FETUS: CPT | Performed by: OBSTETRICS & GYNECOLOGY

## 2020-01-28 RX ORDER — HYDROXYZINE HYDROCHLORIDE 25 MG/1
25 TABLET, FILM COATED ORAL EVERY 6 HOURS PRN
COMMUNITY
End: 2020-06-03

## 2020-02-07 ENCOUNTER — ROUTINE PRENATAL (OUTPATIENT)
Dept: OBGYN CLINIC | Facility: CLINIC | Age: 30
End: 2020-02-07
Payer: COMMERCIAL

## 2020-02-07 VITALS — WEIGHT: 194.6 LBS | SYSTOLIC BLOOD PRESSURE: 110 MMHG | DIASTOLIC BLOOD PRESSURE: 86 MMHG | BODY MASS INDEX: 32.89 KG/M2

## 2020-02-07 DIAGNOSIS — Z23 FLU VACCINE NEED: ICD-10-CM

## 2020-02-07 DIAGNOSIS — O09.293 PRIOR PREGNANCY COMPLICATED BY IUGR, ANTEPARTUM, THIRD TRIMESTER: ICD-10-CM

## 2020-02-07 DIAGNOSIS — Z23 NEED FOR TDAP VACCINATION: ICD-10-CM

## 2020-02-07 DIAGNOSIS — Z3A.31 31 WEEKS GESTATION OF PREGNANCY: ICD-10-CM

## 2020-02-07 DIAGNOSIS — O09.892 PRIOR PREGNANCY COMPLICATED BY PIH, ANTEPARTUM, SECOND TRIMESTER: ICD-10-CM

## 2020-02-07 DIAGNOSIS — F19.20 DRUG ABUSE AND DEPENDENCE (HCC): ICD-10-CM

## 2020-02-07 DIAGNOSIS — Z34.83 ENCOUNTER FOR SUPERVISION OF OTHER NORMAL PREGNANCY IN THIRD TRIMESTER: Primary | ICD-10-CM

## 2020-02-07 DIAGNOSIS — O34.211 MATERNAL CARE DUE TO LOW TRANSVERSE UTERINE SCAR FROM PREVIOUS CESAREAN DELIVERY: ICD-10-CM

## 2020-02-07 DIAGNOSIS — Z11.3 SCREEN FOR STD (SEXUALLY TRANSMITTED DISEASE): ICD-10-CM

## 2020-02-07 DIAGNOSIS — O09.893 SHORT INTERVAL BETWEEN PREGNANCIES AFFECTING PREGNANCY IN THIRD TRIMESTER, ANTEPARTUM: ICD-10-CM

## 2020-02-07 DIAGNOSIS — O09.33 LATE PRENATAL CARE AFFECTING PREGNANCY IN THIRD TRIMESTER: ICD-10-CM

## 2020-02-07 PROCEDURE — 90686 IIV4 VACC NO PRSV 0.5 ML IM: CPT | Performed by: OBSTETRICS & GYNECOLOGY

## 2020-02-07 PROCEDURE — 90471 IMMUNIZATION ADMIN: CPT | Performed by: OBSTETRICS & GYNECOLOGY

## 2020-02-07 PROCEDURE — 99215 OFFICE O/P EST HI 40 MIN: CPT | Performed by: OBSTETRICS & GYNECOLOGY

## 2020-02-07 PROCEDURE — 90472 IMMUNIZATION ADMIN EACH ADD: CPT | Performed by: OBSTETRICS & GYNECOLOGY

## 2020-02-07 PROCEDURE — 87591 N.GONORRHOEAE DNA AMP PROB: CPT | Performed by: OBSTETRICS & GYNECOLOGY

## 2020-02-07 PROCEDURE — 87491 CHLMYD TRACH DNA AMP PROBE: CPT | Performed by: OBSTETRICS & GYNECOLOGY

## 2020-02-07 PROCEDURE — 90715 TDAP VACCINE 7 YRS/> IM: CPT | Performed by: OBSTETRICS & GYNECOLOGY

## 2020-02-07 NOTE — PROGRESS NOTES
30 y o    female at 34 4 wga EGA for PNV  BP : 110/86  TWlb    Patient is presenting today for her first prenatal appointment  She was seen for an OB intake with RN on 19 and has not been seen since  Discussed with patient recommendation for consistent prenatal care and expected prenatal visitation schedule  Patient denies contractions, bleeding or leakage of fluid  Good fetal movement  Patient has been seen at [facility] for level II and follow up [address]  Most recent US on 2020 noted EFW 47%, 3lb 5oz  Normal fetal anatomy and normal fetal growth were noted  Repeat US recommended at 36 weeks due to patient's prior OB history  OB history reviewed:   - prior pregnancy delivered  at 36 6 via LTCS due to fetal intolerance  Delivery  secondary to preeclampsia dx  - prior pregnancy was complicated by failed medical  with exposure to methotrexate  Her daughter was diagnosed with in utero stroke and is currently doing well after early intervention  Thrombophilia testing was performed through [facility] during this pregnancy and lab results were normal      Methadone maintenance -- patient currently controlled on methadone with split dosing of 43mg daily  Methadone received through [facility]  Anxiety -- was previously taking klonapin 0 5mg prn as needed from her prior pyschiatrist but currently taking atarax 25mg q6hr prn  She reports not really noticing much relief and has some klonapin left over that she uses in cases of emergency  28 wk labs have not been performed yet  Labs provided today  Consent signed  Ok to blood transfusion  Full code  Patient plans to breastfeed  Skin to skin, rooming in, delayed cord clamp,  pain management in labor discussed  TDAP recommendations discussed  TDAP and flu vaccination provided today  Rhogam not indicated  Fetal kick counts reviewed  Discussed need for prenatal panel and 28 week labs to be performed   Discussed recommendation to continue methadone maintenance during pregnancy  Due to hx of preeclampsia included urine protein/creatinine ratio to determine baseline  Discussed with patient delivery details  Patient desires repeat  section  Discussed will plan for 39 week RLTCS but will be performed earlier if medically indicated due to preeclampsia  Patient had prolonged epidural to reduce her use of opioids for recovery after  section  She requests for that pain management plan to be applied again for her repeat  section  Follow up in 2 weeks

## 2020-02-08 LAB
C TRACH DNA SPEC QL NAA+PROBE: NEGATIVE
N GONORRHOEA DNA SPEC QL NAA+PROBE: NEGATIVE

## 2020-02-12 ENCOUNTER — TELEPHONE (OUTPATIENT)
Dept: OBGYN CLINIC | Facility: CLINIC | Age: 30
End: 2020-02-12

## 2020-02-12 NOTE — TELEPHONE ENCOUNTER
32w1d OB left message on nurse line c/o sharp pain in front of stomach, states they have been going on for over an hour - even with lying down  RC left message for patient to call the office  Routing to provider to update

## 2020-02-12 NOTE — TELEPHONE ENCOUNTER
LM on voicemail - advised tylenol, warm bath, and increase fluids  If pain worsens or other symptoms such as vaginal bleeding occur, please call on call provider and consider evaluation at [facility]

## 2020-02-12 NOTE — TELEPHONE ENCOUNTER
Would recommend tylenol, warm bath, and increased fluids   If pain worsens or other symptoms such as vaginal bleeding occur, call on call provider and consider evaluation at [facility]

## 2020-02-18 ENCOUNTER — APPOINTMENT (OUTPATIENT)
Dept: LAB | Facility: CLINIC | Age: 30
End: 2020-02-18
Payer: COMMERCIAL

## 2020-02-18 ENCOUNTER — TRANSCRIBE ORDERS (OUTPATIENT)
Dept: LAB | Facility: CLINIC | Age: 30
End: 2020-02-18

## 2020-02-18 LAB
CREAT UR-MCNC: 172 MG/DL
PROT UR-MCNC: 32 MG/DL
PROT/CREAT UR: 0.19 MG/G{CREAT} (ref 0–0.1)

## 2020-02-18 PROCEDURE — 82570 ASSAY OF URINE CREATININE: CPT | Performed by: OBSTETRICS & GYNECOLOGY

## 2020-02-18 PROCEDURE — 84156 ASSAY OF PROTEIN URINE: CPT | Performed by: OBSTETRICS & GYNECOLOGY

## 2020-02-20 ENCOUNTER — APPOINTMENT (OUTPATIENT)
Dept: LAB | Facility: CLINIC | Age: 30
End: 2020-02-20
Payer: COMMERCIAL

## 2020-02-20 DIAGNOSIS — Z34.83 ENCOUNTER FOR SUPERVISION OF OTHER NORMAL PREGNANCY IN THIRD TRIMESTER: ICD-10-CM

## 2020-02-20 LAB
ERYTHROCYTE [DISTWIDTH] IN BLOOD BY AUTOMATED COUNT: 14.1 % (ref 11.6–15.1)
GLUCOSE 1H P 50 G GLC PO SERPL-MCNC: 74 MG/DL
HCT VFR BLD AUTO: 31.4 % (ref 34.8–46.1)
HGB BLD-MCNC: 9.7 G/DL (ref 11.5–15.4)
MCH RBC QN AUTO: 26.7 PG (ref 26.8–34.3)
MCHC RBC AUTO-ENTMCNC: 30.9 G/DL (ref 31.4–37.4)
MCV RBC AUTO: 87 FL (ref 82–98)
PLATELET # BLD AUTO: 283 THOUSANDS/UL (ref 149–390)
PMV BLD AUTO: 11.1 FL (ref 8.9–12.7)
RBC # BLD AUTO: 3.63 MILLION/UL (ref 3.81–5.12)
WBC # BLD AUTO: 9.78 THOUSAND/UL (ref 4.31–10.16)

## 2020-02-20 PROCEDURE — 82950 GLUCOSE TEST: CPT

## 2020-02-20 PROCEDURE — 36415 COLL VENOUS BLD VENIPUNCTURE: CPT

## 2020-02-20 PROCEDURE — 85027 COMPLETE CBC AUTOMATED: CPT

## 2020-02-21 ENCOUNTER — ROUTINE PRENATAL (OUTPATIENT)
Dept: OBGYN CLINIC | Facility: CLINIC | Age: 30
End: 2020-02-21

## 2020-02-21 VITALS — SYSTOLIC BLOOD PRESSURE: 110 MMHG | DIASTOLIC BLOOD PRESSURE: 68 MMHG | BODY MASS INDEX: 32.62 KG/M2 | WEIGHT: 193 LBS

## 2020-02-21 DIAGNOSIS — O09.293 PRIOR PREGNANCY COMPLICATED BY IUGR, ANTEPARTUM, THIRD TRIMESTER: ICD-10-CM

## 2020-02-21 DIAGNOSIS — O09.892 PRIOR PREGNANCY COMPLICATED BY PIH, ANTEPARTUM, SECOND TRIMESTER: ICD-10-CM

## 2020-02-21 DIAGNOSIS — O09.893 SHORT INTERVAL BETWEEN PREGNANCIES AFFECTING PREGNANCY IN THIRD TRIMESTER, ANTEPARTUM: ICD-10-CM

## 2020-02-21 DIAGNOSIS — O09.33 LATE PRENATAL CARE AFFECTING PREGNANCY IN THIRD TRIMESTER: ICD-10-CM

## 2020-02-21 DIAGNOSIS — Z3A.33 33 WEEKS GESTATION OF PREGNANCY: ICD-10-CM

## 2020-02-21 PROBLEM — O99.013 ANEMIA AFFECTING PREGNANCY IN THIRD TRIMESTER: Status: ACTIVE | Noted: 2020-02-21

## 2020-02-21 PROCEDURE — PNV: Performed by: OBSTETRICS & GYNECOLOGY

## 2020-02-21 RX ORDER — SODIUM CHLORIDE 9 MG/ML
20 INJECTION, SOLUTION INTRAVENOUS ONCE
Status: CANCELLED | OUTPATIENT
Start: 2020-02-25

## 2020-02-21 RX ORDER — CYANOCOBALAMIN 1000 UG/ML
1000 INJECTION INTRAMUSCULAR; SUBCUTANEOUS ONCE
Status: CANCELLED | OUTPATIENT
Start: 2020-02-25

## 2020-02-21 NOTE — PROGRESS NOTES
This is a 30 y o   at 33w3d who presents for return OB visit  No complaints  Denies contractions, leakage, bleeding  Endorses fetal movement   BP: 110/68 TWlb  Methadone use: followed by [facility]  Plan for extended epidural postpartum to decrease opioid use  Prior c/s: desires repeat  Scheduled for  with Dr Dottie Jones  Hx preeclampsia: on LD ASA daily, normotensive and asymptomatic today  Anemia: given that we are planning a repeat c/s will arrange IV iron infusions  Current hgb 9 7  Contraception: desires LNG-IUD placed postplacentally if possible  Will contact pharmacy/billing to arrange  Has growth US with [facility] scheduled    Reviewed 28 wk labs, passed glucola  F/up 2 wks

## 2020-02-25 ENCOUNTER — HOSPITAL ENCOUNTER (OUTPATIENT)
Dept: INFUSION CENTER | Facility: CLINIC | Age: 30
Discharge: HOME | End: 2020-02-25
Payer: COMMERCIAL

## 2020-02-25 ENCOUNTER — DOCUMENTATION (OUTPATIENT)
Dept: RADIATION ONCOLOGY | Facility: HOSPITAL | Age: 30
End: 2020-02-25

## 2020-02-25 VITALS
DIASTOLIC BLOOD PRESSURE: 71 MMHG | RESPIRATION RATE: 20 BRPM | TEMPERATURE: 98.2 F | HEART RATE: 102 BPM | SYSTOLIC BLOOD PRESSURE: 117 MMHG

## 2020-02-25 DIAGNOSIS — O99.013 ANEMIA AFFECTING PREGNANCY IN THIRD TRIMESTER: Primary | ICD-10-CM

## 2020-02-25 PROCEDURE — 96365 THER/PROPH/DIAG IV INF INIT: CPT

## 2020-02-25 PROCEDURE — 96372 THER/PROPH/DIAG INJ SC/IM: CPT

## 2020-02-25 RX ORDER — CYANOCOBALAMIN 1000 UG/ML
1000 INJECTION INTRAMUSCULAR; SUBCUTANEOUS ONCE
Status: COMPLETED | OUTPATIENT
Start: 2020-02-25 | End: 2020-02-25

## 2020-02-25 RX ORDER — SODIUM CHLORIDE 9 MG/ML
20 INJECTION, SOLUTION INTRAVENOUS ONCE
Status: CANCELLED | OUTPATIENT
Start: 2020-02-28

## 2020-02-25 RX ORDER — CYANOCOBALAMIN 1000 UG/ML
1000 INJECTION INTRAMUSCULAR; SUBCUTANEOUS ONCE
Status: CANCELLED | OUTPATIENT
Start: 2020-02-28

## 2020-02-25 RX ORDER — SODIUM CHLORIDE 9 MG/ML
20 INJECTION, SOLUTION INTRAVENOUS ONCE
Status: COMPLETED | OUTPATIENT
Start: 2020-02-25 | End: 2020-02-25

## 2020-02-25 RX ADMIN — CYANOCOBALAMIN 1000 MCG: 1000 INJECTION, SOLUTION INTRAMUSCULAR; SUBCUTANEOUS at 08:36

## 2020-02-25 RX ADMIN — SODIUM CHLORIDE 20 ML/HR: 0.9 INJECTION, SOLUTION INTRAVENOUS at 08:34

## 2020-02-25 RX ADMIN — IRON SUCROSE 200 MG: 20 INJECTION, SOLUTION INTRAVENOUS at 08:34

## 2020-02-25 NOTE — PROGRESS NOTES
Pt was seen in the [facility] [place]  I was unable to see the pt at her visit  Placed a call to the pt but the call went to voice mail  Left a message for the pt with my name, contact info and reason for my call  Encouraged pt to call me if any issues arise

## 2020-02-25 NOTE — PROGRESS NOTES
Pt  Tolerated Vitamin B12 IM injection in SERGIO & venofer infusion w/out adverse reaction  Confirmed pts  Next appt   Pt  Declined AVS

## 2020-02-28 ENCOUNTER — HOSPITAL ENCOUNTER (OUTPATIENT)
Dept: INFUSION CENTER | Facility: CLINIC | Age: 30
Discharge: HOME | End: 2020-02-28
Payer: COMMERCIAL

## 2020-02-28 VITALS
DIASTOLIC BLOOD PRESSURE: 68 MMHG | TEMPERATURE: 98.4 F | HEART RATE: 96 BPM | RESPIRATION RATE: 18 BRPM | SYSTOLIC BLOOD PRESSURE: 104 MMHG

## 2020-02-28 DIAGNOSIS — O99.013 ANEMIA AFFECTING PREGNANCY IN THIRD TRIMESTER: Primary | ICD-10-CM

## 2020-02-28 PROCEDURE — 96365 THER/PROPH/DIAG IV INF INIT: CPT

## 2020-02-28 PROCEDURE — 96372 THER/PROPH/DIAG INJ SC/IM: CPT

## 2020-02-28 RX ORDER — SODIUM CHLORIDE 9 MG/ML
20 INJECTION, SOLUTION INTRAVENOUS ONCE
Status: CANCELLED | OUTPATIENT
Start: 2020-03-03

## 2020-02-28 RX ORDER — CYANOCOBALAMIN 1000 UG/ML
1000 INJECTION INTRAMUSCULAR; SUBCUTANEOUS ONCE
Status: CANCELLED | OUTPATIENT
Start: 2020-03-03

## 2020-02-28 RX ORDER — CYANOCOBALAMIN 1000 UG/ML
1000 INJECTION INTRAMUSCULAR; SUBCUTANEOUS ONCE
Status: COMPLETED | OUTPATIENT
Start: 2020-02-28 | End: 2020-02-28

## 2020-02-28 RX ORDER — SODIUM CHLORIDE 9 MG/ML
20 INJECTION, SOLUTION INTRAVENOUS ONCE
Status: COMPLETED | OUTPATIENT
Start: 2020-02-28 | End: 2020-02-28

## 2020-02-28 RX ADMIN — IRON SUCROSE 200 MG: 20 INJECTION, SOLUTION INTRAVENOUS at 11:16

## 2020-02-28 RX ADMIN — SODIUM CHLORIDE 20 ML/HR: 0.9 INJECTION, SOLUTION INTRAVENOUS at 11:12

## 2020-02-28 RX ADMIN — CYANOCOBALAMIN 1000 MCG: 1000 INJECTION, SOLUTION INTRAMUSCULAR; SUBCUTANEOUS at 11:16

## 2020-02-28 NOTE — PROGRESS NOTES
Patient tolerated treatment without complications  Patient declined AVS  Reviewed upcoming appointments with patient

## 2020-02-28 NOTE — PROGRESS NOTES
Patient here for venofer infusion and B12 injection  Patient resting with no complaints  Vitals stable  Call bell within reach  Will continue to monitor

## 2020-03-03 ENCOUNTER — HOSPITAL ENCOUNTER (OUTPATIENT)
Dept: INFUSION CENTER | Facility: CLINIC | Age: 30
Discharge: HOME | End: 2020-03-03
Payer: COMMERCIAL

## 2020-03-03 VITALS
OXYGEN SATURATION: 97 % | TEMPERATURE: 96.5 F | SYSTOLIC BLOOD PRESSURE: 120 MMHG | RESPIRATION RATE: 22 BRPM | HEART RATE: 103 BPM | DIASTOLIC BLOOD PRESSURE: 80 MMHG

## 2020-03-03 DIAGNOSIS — O99.013 ANEMIA AFFECTING PREGNANCY IN THIRD TRIMESTER: Primary | ICD-10-CM

## 2020-03-03 PROCEDURE — 96372 THER/PROPH/DIAG INJ SC/IM: CPT

## 2020-03-03 PROCEDURE — 96365 THER/PROPH/DIAG IV INF INIT: CPT

## 2020-03-03 RX ORDER — SODIUM CHLORIDE 9 MG/ML
20 INJECTION, SOLUTION INTRAVENOUS ONCE
Status: COMPLETED | OUTPATIENT
Start: 2020-03-03 | End: 2020-03-03

## 2020-03-03 RX ORDER — CYANOCOBALAMIN 1000 UG/ML
1000 INJECTION INTRAMUSCULAR; SUBCUTANEOUS ONCE
Status: CANCELLED | OUTPATIENT
Start: 2020-03-05

## 2020-03-03 RX ORDER — CYANOCOBALAMIN 1000 UG/ML
1000 INJECTION INTRAMUSCULAR; SUBCUTANEOUS ONCE
Status: COMPLETED | OUTPATIENT
Start: 2020-03-03 | End: 2020-03-03

## 2020-03-03 RX ORDER — SODIUM CHLORIDE 9 MG/ML
20 INJECTION, SOLUTION INTRAVENOUS ONCE
Status: CANCELLED | OUTPATIENT
Start: 2020-03-05

## 2020-03-03 RX ADMIN — IRON SUCROSE 200 MG: 20 INJECTION, SOLUTION INTRAVENOUS at 13:38

## 2020-03-03 RX ADMIN — SODIUM CHLORIDE 20 ML/HR: 0.9 INJECTION, SOLUTION INTRAVENOUS at 13:37

## 2020-03-03 RX ADMIN — CYANOCOBALAMIN 1000 MCG: 1000 INJECTION, SOLUTION INTRAMUSCULAR; SUBCUTANEOUS at 13:40

## 2020-03-03 NOTE — PROGRESS NOTES
Patient was here for venofer and b12 and is doing well  She offered no c/o  She tolerated b12 injection as ordered to right deltoid, bandaid applied and CDI  Venofer infused without incident and patient discharged post   She will RTO 3/5/20 for same

## 2020-03-03 NOTE — PATIENT INSTRUCTIONS
1     2     3    INF THERAPY PLAN   1:00 PM   (120 min )   AN INF CHAIR [facility] 11 4     5    INF THERAPY PLAN  11:30 AM   (120 min )   AN INF CHAIR 7   [address] [place] [address] 6    OB VISIT PG  12:15 PM   (15 min )   Gato Estrada MD   [address] 7         Cycle 1, Treatment 3   Cycle 1, Treatment 4    8     9    INF THERAPY PLAN   2:30 PM   (120 min )   AN INF CHAIR 8   St  [address] [place] [address] 10    ULTRASOUND PG   1:45 PM   (45 min )    [address] 11     12    INF THERAPY PLAN  10:30 AM   (120 min )   AN INF CHAIR [address] 13    OB VISIT PG  11:45 AM   (15 min )   Barbara Olmstead DO   [address] 14         Cycle 1, Treatment 5   Cycle 1, Treatment 6    15     16     17     18     19     20    OB VISIT PG  11:45 AM   (15 min )   DO Cassidy Carreon [address] 21                22     23     24     25     26     27     28  Happy Birthday!                29     30     31                                         Treatment Details       3/3/2020 - Cycle 1, Treatment 3      Supportive Care: APPT 17, ONCBCN NURSE [id number], sodium chloride, iron sucrose (VENOFER), MONITOR ANNMARIE    3/6/2020 - Cycle 1, Treatment 4      Supportive Care: APPT 17    3/10/2020 - Cycle 1, Treatment 5      Supportive Care: APPT 17    3/13/2020 - Cycle 1, Treatment 6      Supportive Care: APPT 17

## 2020-03-05 ENCOUNTER — HOSPITAL ENCOUNTER (OUTPATIENT)
Dept: INFUSION CENTER | Facility: CLINIC | Age: 30
Discharge: HOME | End: 2020-03-05
Payer: COMMERCIAL

## 2020-03-05 VITALS
RESPIRATION RATE: 16 BRPM | DIASTOLIC BLOOD PRESSURE: 68 MMHG | HEART RATE: 110 BPM | TEMPERATURE: 98 F | OXYGEN SATURATION: 99 % | SYSTOLIC BLOOD PRESSURE: 102 MMHG

## 2020-03-05 DIAGNOSIS — O99.013 ANEMIA AFFECTING PREGNANCY IN THIRD TRIMESTER: Primary | ICD-10-CM

## 2020-03-05 PROCEDURE — 96372 THER/PROPH/DIAG INJ SC/IM: CPT

## 2020-03-05 PROCEDURE — 96365 THER/PROPH/DIAG IV INF INIT: CPT

## 2020-03-05 RX ORDER — CYANOCOBALAMIN 1000 UG/ML
1000 INJECTION INTRAMUSCULAR; SUBCUTANEOUS ONCE
Status: COMPLETED | OUTPATIENT
Start: 2020-03-05 | End: 2020-03-05

## 2020-03-05 RX ORDER — CYANOCOBALAMIN 1000 UG/ML
1000 INJECTION INTRAMUSCULAR; SUBCUTANEOUS ONCE
Status: CANCELLED | OUTPATIENT
Start: 2020-03-09

## 2020-03-05 RX ORDER — SODIUM CHLORIDE 9 MG/ML
20 INJECTION, SOLUTION INTRAVENOUS ONCE
Status: CANCELLED | OUTPATIENT
Start: 2020-03-09

## 2020-03-05 RX ORDER — SODIUM CHLORIDE 9 MG/ML
20 INJECTION, SOLUTION INTRAVENOUS ONCE
Status: COMPLETED | OUTPATIENT
Start: 2020-03-05 | End: 2020-03-05

## 2020-03-05 RX ADMIN — SODIUM CHLORIDE 20 ML/HR: 0.9 INJECTION, SOLUTION INTRAVENOUS at 11:36

## 2020-03-05 RX ADMIN — IRON SUCROSE 200 MG: 20 INJECTION, SOLUTION INTRAVENOUS at 11:38

## 2020-03-05 RX ADMIN — CYANOCOBALAMIN 1000 MCG: 1000 INJECTION, SOLUTION INTRAMUSCULAR; SUBCUTANEOUS at 11:39

## 2020-03-05 NOTE — PLAN OF CARE
Problem: Potential for Falls  Goal: Patient will remain free of falls  Description  INTERVENTIONS:  - Assess patient frequently for physical needs  -  Identify cognitive and physical deficits and behaviors that affect risk of falls  -  [place] fall precautions as indicated by assessment   - Educate patient/family on patient safety including physical limitations  - Instruct patient to call for assistance with activity based on assessment  - Modify environment to reduce risk of injury  - Consider OT/PT consult to assist with strengthening/mobility  Outcome: Progressing     Problem: Knowledge Deficit  Goal: Patient/family/caregiver demonstrates understanding of disease process, treatment plan, medications, and discharge instructions  Description  Complete learning assessment and assess knowledge base    Interventions:  - Provide teaching at level of understanding  - Provide teaching via preferred learning methods  Outcome: Progressing

## 2020-03-05 NOTE — PROGRESS NOTES
Pt to clinic for venofer infusion and B12 injection, pt offers no complaints, aware of next appointment, avs printed

## 2020-03-06 ENCOUNTER — ROUTINE PRENATAL (OUTPATIENT)
Dept: OBGYN CLINIC | Facility: CLINIC | Age: 30
End: 2020-03-06
Payer: COMMERCIAL

## 2020-03-06 VITALS — WEIGHT: 195 LBS | BODY MASS INDEX: 32.95 KG/M2 | SYSTOLIC BLOOD PRESSURE: 110 MMHG | DIASTOLIC BLOOD PRESSURE: 76 MMHG

## 2020-03-06 DIAGNOSIS — O09.293 PRIOR PREGNANCY COMPLICATED BY IUGR, ANTEPARTUM, THIRD TRIMESTER: ICD-10-CM

## 2020-03-06 DIAGNOSIS — O09.33 LATE PRENATAL CARE AFFECTING PREGNANCY IN THIRD TRIMESTER: ICD-10-CM

## 2020-03-06 DIAGNOSIS — O99.013 ANEMIA AFFECTING PREGNANCY IN THIRD TRIMESTER: ICD-10-CM

## 2020-03-06 DIAGNOSIS — Z3A.35 35 WEEKS GESTATION OF PREGNANCY: ICD-10-CM

## 2020-03-06 DIAGNOSIS — O09.893 SHORT INTERVAL BETWEEN PREGNANCIES AFFECTING PREGNANCY IN THIRD TRIMESTER, ANTEPARTUM: ICD-10-CM

## 2020-03-06 DIAGNOSIS — F19.20 DRUG ABUSE AND DEPENDENCE (HCC): ICD-10-CM

## 2020-03-06 DIAGNOSIS — O09.93 HIGH-RISK PREGNANCY SUPERVISION, THIRD TRIMESTER: Primary | ICD-10-CM

## 2020-03-06 DIAGNOSIS — O34.211 MATERNAL CARE DUE TO LOW TRANSVERSE UTERINE SCAR FROM PREVIOUS CESAREAN DELIVERY: ICD-10-CM

## 2020-03-06 DIAGNOSIS — O09.892 PRIOR PREGNANCY COMPLICATED BY PIH, ANTEPARTUM, SECOND TRIMESTER: ICD-10-CM

## 2020-03-06 PROCEDURE — 99215 OFFICE O/P EST HI 40 MIN: CPT | Performed by: OBSTETRICS & GYNECOLOGY

## 2020-03-06 NOTE — PROGRESS NOTES
30 y o    female at 34 2 wga EGA for PNV  BP : 110/76  TWlb    Patient is doing well and has no complaints  Methadone use  Followed by [facility]  43 mg daily, split dosing 29 mg in AM and 14 mg in PM  GBS due at next visit  Planned RLTCS with postplacental IUD insertion planned on 2020 with Ed Ca and Quoc Barrios     - plan for extended epidural postpartum to reduce opioid use for recovery  Follow up in 1 week

## 2020-03-10 PROBLEM — O99.323 METHADONE MAINTENANCE TREATMENT AFFECTING PREGNANCY IN THIRD TRIMESTER (HCC): Status: ACTIVE | Noted: 2020-03-10

## 2020-03-10 PROBLEM — F11.20 METHADONE MAINTENANCE TREATMENT AFFECTING PREGNANCY IN THIRD TRIMESTER (HCC): Status: ACTIVE | Noted: 2020-03-10

## 2020-03-12 ENCOUNTER — HOSPITAL ENCOUNTER (OUTPATIENT)
Dept: INFUSION CENTER | Facility: CLINIC | Age: 30
Discharge: HOME | End: 2020-03-12
Payer: COMMERCIAL

## 2020-03-12 VITALS
OXYGEN SATURATION: 99 % | SYSTOLIC BLOOD PRESSURE: 134 MMHG | HEART RATE: 108 BPM | TEMPERATURE: 98.2 F | RESPIRATION RATE: 18 BRPM | DIASTOLIC BLOOD PRESSURE: 95 MMHG

## 2020-03-12 DIAGNOSIS — O99.013 ANEMIA AFFECTING PREGNANCY IN THIRD TRIMESTER: Primary | ICD-10-CM

## 2020-03-12 PROCEDURE — 96365 THER/PROPH/DIAG IV INF INIT: CPT

## 2020-03-12 PROCEDURE — 96372 THER/PROPH/DIAG INJ SC/IM: CPT

## 2020-03-12 RX ORDER — CYANOCOBALAMIN 1000 UG/ML
1000 INJECTION INTRAMUSCULAR; SUBCUTANEOUS ONCE
Status: COMPLETED | OUTPATIENT
Start: 2020-03-12 | End: 2020-03-12

## 2020-03-12 RX ORDER — SODIUM CHLORIDE 9 MG/ML
20 INJECTION, SOLUTION INTRAVENOUS ONCE
Status: CANCELLED | OUTPATIENT
Start: 2020-03-17

## 2020-03-12 RX ORDER — SODIUM CHLORIDE 9 MG/ML
20 INJECTION, SOLUTION INTRAVENOUS ONCE
Status: CANCELLED | OUTPATIENT
Start: 2020-03-13

## 2020-03-12 RX ORDER — CYANOCOBALAMIN 1000 UG/ML
1000 INJECTION INTRAMUSCULAR; SUBCUTANEOUS ONCE
Status: CANCELLED | OUTPATIENT
Start: 2020-03-17

## 2020-03-12 RX ORDER — SODIUM CHLORIDE 9 MG/ML
20 INJECTION, SOLUTION INTRAVENOUS ONCE
Status: COMPLETED | OUTPATIENT
Start: 2020-03-12 | End: 2020-03-12

## 2020-03-12 RX ORDER — CYANOCOBALAMIN 1000 UG/ML
1000 INJECTION INTRAMUSCULAR; SUBCUTANEOUS ONCE
Status: CANCELLED | OUTPATIENT
Start: 2020-03-13

## 2020-03-12 RX ADMIN — CYANOCOBALAMIN 1000 MCG: 1000 INJECTION, SOLUTION INTRAMUSCULAR; SUBCUTANEOUS at 12:13

## 2020-03-12 RX ADMIN — IRON SUCROSE 200 MG: 20 INJECTION, SOLUTION INTRAVENOUS at 11:02

## 2020-03-12 RX ADMIN — SODIUM CHLORIDE 20 ML/HR: 0.9 INJECTION, SOLUTION INTRAVENOUS at 11:01

## 2020-03-12 NOTE — PROGRESS NOTES
Patient tolerated venofer without incident  Prior to discharge b12 admin as ordered to right deltoid, bandaid applied and CDI  Patient mike this well and was discharged  She offered no c/o and will RTO 3/17/20 for b12 and venofer

## 2020-03-12 NOTE — PROGRESS NOTES
Patient presents today for treatment with Venofer and Vitamin b12 injection  Patient offers no complaints  VSS  Peripheral IV inserted without incident

## 2020-03-12 NOTE — PATIENT INSTRUCTIONS
March 2020 Sunday Monday Tuesday Wednesday Thursday Friday Saturday   1     2     3    INF THERAPY PLAN   1:00 PM   (120 min )   AN INF CHAIR [facility] 4     5    INF THERAPY PLAN  11:30 AM   (120 min )   AN INF CHAIR 7   [facility] 6    OB VISIT PG  12:00 PM   (15 min )   Tere Robles MD   [facility] 37 7         Cycle 1, Treatment 3  Cycle 1, Treatment 4     8     9    INF THERAPY PLAN   2:30 PM   (120 min )   AN INF CHAIR [facility] 10     11     12    INF THERAPY PLAN  10:30 AM   (120 min )   AN INF CHAIR [facility] 13    OB VISIT PG  11:45 AM   (15 min )   Oneda Pock, DO   [facility] 37 14           Cycle 1, Treatment 5 Cycle 1, Treatment 6    15     16     17    INF THERAPY PLAN   2:30 PM   (120 min )   AN INF BED 6   [facility] 18     19     20    OB VISIT PG  11:45 AM   (15 min )   Oneda Pock, DO   [address] [place] 21                22     23     24     25     26     27    OB VISIT PG  12:15 PM   (15 min )   Oneda Pock, DO   [address] [place] 29  Happy Birthday!                29     30     31                                         Treatment Details       3/3/2020 - Cycle 1, Treatment 3      Supportive Care: APPT 17, ONCBCN NURSE [id number], sodium chloride, iron sucrose (VENOFER), MONITOR ANNMARIE    3/5/2020 - Cycle 1, Treatment 4      Supportive Care: APPT 17, ONCBCN NURSE [id number], sodium chloride, iron sucrose (VENOFER), MONITOR ANNMARIE    3/12/2020 - Cycle 1, Treatment 5      Supportive Care: APPT 17, ONCBCN NURSE [id number], sodium chloride, iron sucrose (VENOFER), MONITOR ANNMARIE    3/13/2020 - Cycle 1, Treatment 6      Supportive Care: APPT                   1     2     SECTION () REPEAT   8:00 AM   Alice Will MD   AN LD 3     4                5     6     7     8     9     10     11                12     03     08     72     91     89     27                03     93     60     28     31     78     67                31     77     41     38     40

## 2020-03-13 ENCOUNTER — ROUTINE PRENATAL (OUTPATIENT)
Dept: OBGYN CLINIC | Facility: CLINIC | Age: 30
End: 2020-03-13

## 2020-03-13 VITALS — SYSTOLIC BLOOD PRESSURE: 110 MMHG | BODY MASS INDEX: 33.97 KG/M2 | DIASTOLIC BLOOD PRESSURE: 76 MMHG | WEIGHT: 201 LBS

## 2020-03-13 DIAGNOSIS — O09.892 PRIOR PREGNANCY COMPLICATED BY PIH, ANTEPARTUM, SECOND TRIMESTER: ICD-10-CM

## 2020-03-13 DIAGNOSIS — O09.893 SHORT INTERVAL BETWEEN PREGNANCIES AFFECTING PREGNANCY IN THIRD TRIMESTER, ANTEPARTUM: ICD-10-CM

## 2020-03-13 DIAGNOSIS — O09.33 LATE PRENATAL CARE AFFECTING PREGNANCY IN THIRD TRIMESTER: ICD-10-CM

## 2020-03-13 DIAGNOSIS — Z34.83 ENCOUNTER FOR SUPERVISION OF OTHER NORMAL PREGNANCY IN THIRD TRIMESTER: Primary | ICD-10-CM

## 2020-03-13 DIAGNOSIS — O99.013 ANEMIA AFFECTING PREGNANCY IN THIRD TRIMESTER: ICD-10-CM

## 2020-03-13 DIAGNOSIS — Z3A.36 36 WEEKS GESTATION OF PREGNANCY: ICD-10-CM

## 2020-03-13 DIAGNOSIS — O09.293 PRIOR PREGNANCY COMPLICATED BY IUGR, ANTEPARTUM, THIRD TRIMESTER: ICD-10-CM

## 2020-03-13 PROCEDURE — 87653 STREP B DNA AMP PROBE: CPT | Performed by: OBSTETRICS & GYNECOLOGY

## 2020-03-13 PROCEDURE — PNV: Performed by: OBSTETRICS & GYNECOLOGY

## 2020-03-13 NOTE — PROGRESS NOTES
This is a 30 y o   at 36w3d who presents for return OB visit  No complaints  Denies contractions, leakage, bleeding  Endorses fetal movement   BP: 110/76 TWlb    Elevated BP at infusion center yesterday (134/95)  Normotensive in office today  Cont to monitor closely  Discussed s/sx of preeclampsia    Planned repeat c/s with PP IUD scheduled  Continue methadone maintenance  GBS collected, NKDA  F/up 1 wk

## 2020-03-15 LAB — GP B STREP DNA SPEC QL NAA+PROBE: NORMAL

## 2020-03-17 ENCOUNTER — HOSPITAL ENCOUNTER (OUTPATIENT)
Dept: INFUSION CENTER | Facility: CLINIC | Age: 30
Discharge: HOME | End: 2020-03-17
Payer: COMMERCIAL

## 2020-03-17 VITALS
SYSTOLIC BLOOD PRESSURE: 119 MMHG | DIASTOLIC BLOOD PRESSURE: 72 MMHG | OXYGEN SATURATION: 95 % | RESPIRATION RATE: 18 BRPM | HEART RATE: 82 BPM | TEMPERATURE: 97.5 F

## 2020-03-17 DIAGNOSIS — O99.013 ANEMIA AFFECTING PREGNANCY IN THIRD TRIMESTER: Primary | ICD-10-CM

## 2020-03-17 PROCEDURE — 96365 THER/PROPH/DIAG IV INF INIT: CPT

## 2020-03-17 PROCEDURE — 96372 THER/PROPH/DIAG INJ SC/IM: CPT

## 2020-03-17 RX ORDER — SODIUM CHLORIDE 9 MG/ML
20 INJECTION, SOLUTION INTRAVENOUS ONCE
Status: COMPLETED | OUTPATIENT
Start: 2020-03-17 | End: 2020-03-17

## 2020-03-17 RX ORDER — SODIUM CHLORIDE 9 MG/ML
20 INJECTION, SOLUTION INTRAVENOUS ONCE
Status: CANCELLED | OUTPATIENT
Start: 2020-03-19

## 2020-03-17 RX ORDER — CYANOCOBALAMIN 1000 UG/ML
1000 INJECTION INTRAMUSCULAR; SUBCUTANEOUS ONCE
Status: CANCELLED | OUTPATIENT
Start: 2020-03-19

## 2020-03-17 RX ORDER — CYANOCOBALAMIN 1000 UG/ML
1000 INJECTION INTRAMUSCULAR; SUBCUTANEOUS ONCE
Status: COMPLETED | OUTPATIENT
Start: 2020-03-17 | End: 2020-03-17

## 2020-03-17 RX ADMIN — SODIUM CHLORIDE 20 ML/HR: 0.9 INJECTION, SOLUTION INTRAVENOUS at 15:15

## 2020-03-17 RX ADMIN — CYANOCOBALAMIN 1000 MCG: 1000 INJECTION, SOLUTION INTRAMUSCULAR; SUBCUTANEOUS at 15:14

## 2020-03-17 RX ADMIN — IRON SUCROSE 200 MG: 20 INJECTION, SOLUTION INTRAVENOUS at 15:15

## 2020-03-17 NOTE — PATIENT INSTRUCTIONS
1     2     3    INF THERAPY PLAN   1:00 PM   (120 min )   AN INF CHAIR [address] 4     5    INF THERAPY PLAN  11:30 AM   (120 min )   AN INF CHAIR [address] 6    OB VISIT PG  12:00 PM   (15 min )   Francisca Villagran MD   [facility] 37 7         Cycle 1, Treatment 3  Cycle 1, Treatment 4     8     9    INF THERAPY PLAN   2:30 PM   (120 min )   AN INF CHAIR [address] [place] [address] 10     11     12    INF THERAPY PLAN  10:30 AM   (120 min )   AN INF CHAIR [address] 13    OB VISIT PG  11:45 AM   (15 min )   DO Tyson Maldonado [place] 14           Cycle 1, Treatment 5     15     16     17    INF THERAPY PLAN   2:30 PM   (120 min )   AN INF BED 6   [address] 18    ULTRASOUND PG   3:15 PM   (45 min )    US [address] [place] [address] 19    INF THERAPY PLAN   2:30 PM   (120 min )   AN INF CHAIR 3   [address] [place] [address] 20    OB VISIT PG  11:45 AM   (15 min )   DO Tyson Maldonado [place] 21         Cycle 1, Treatment 6  Cycle 1, Treatment 7     22     23     24    INF THERAPY PLAN   2:00 PM   (120 min )   AN INF CHAIR [address] 25     26    INF THERAPY PLAN   2:00 PM   (120 min )   AN INF CHAIR 7   [address] [place] [address] 27    OB VISIT PG  12:15 PM   (15 min )   DO Tyson Maldonado [place] 29  Happy Birthday!          Cycle 1, Treatment 8  Cycle 1, Treatment 9     29     30     31    INF THERAPY PLAN   2:00 PM   (120 min )   AN INF CHAIR [address] Cycle 1, Treatment 10            Treatment Details       3/3/2020 - Cycle 1, Treatment 3      Supportive Care: APPT 17, ONCBCN NURSE [id number], sodium chloride, iron sucrose (VENOFER), MONITOR ANNMARIE    3/5/2020 - Cycle 1, Treatment 4      Supportive Care: APPT 17, ONCBCN NURSE [id number], sodium chloride, iron sucrose (VENOFER), MONITOR ANNMARIE    3/12/2020 - Cycle 1, Treatment 5      Supportive Care: APPT 17, ONCBCN NURSE [id number], sodium chloride, iron sucrose (VENOFER), MONITOR ANNMARIE    3/17/2020 - Cycle 1, Treatment 6      Supportive Care: APPT 17, ONCBCN NURSE [id number], sodium chloride, iron sucrose (VENOFER), MONITOR ANNMARIE    3/19/2020 - Cycle 1, Treatment 7      Supportive Care: APPT 17    3/24/2020 - Cycle 1, Treatment 8      Supportive Care: APPT 17    3/26/2020 - Cycle 1, Treatment 9      Supportive Care: APPT 17    3/31/2020 - Cycle 1, Treatment 10      Supportive Care: APPT                   1     2     SECTION () REPEAT   8:00 AM   Sue Christiansen MD   AN LD 3     4                5     6     7     8     9     10     11                12     13     14     15     16     17     18                19     20     21     22     23     24     25                26     27     28     29     30

## 2020-03-17 NOTE — PROGRESS NOTES
Patient here for venofer and b12, she is doing well  She tolerated b12 to right deltoid as orderd, bandaid applied and CDI  Venofer infusing as ordered  First IV attempt with initial success and blood return stopped with advancement of cathlon  Cathlon d/c and CDI, dry dsg applied  2nd attempt with success

## 2020-03-17 NOTE — PROGRESS NOTES
Patient tolerated venofer without incident and was discharged post   She offers no c/o and will RTO 3/19/20 for her next dosing

## 2020-03-18 ENCOUNTER — DOCUMENTATION (OUTPATIENT)
Dept: PERINATAL CARE | Facility: OTHER | Age: 30
End: 2020-03-18

## 2020-03-18 ENCOUNTER — ULTRASOUND (OUTPATIENT)
Dept: PERINATAL CARE | Facility: OTHER | Age: 30
End: 2020-03-18
Payer: COMMERCIAL

## 2020-03-18 VITALS
HEIGHT: 64 IN | BODY MASS INDEX: 35.23 KG/M2 | DIASTOLIC BLOOD PRESSURE: 71 MMHG | WEIGHT: 206.35 LBS | SYSTOLIC BLOOD PRESSURE: 105 MMHG | HEART RATE: 101 BPM

## 2020-03-18 DIAGNOSIS — O09.293 PRIOR PREGNANCY COMPLICATED BY IUGR, ANTEPARTUM, THIRD TRIMESTER: ICD-10-CM

## 2020-03-18 DIAGNOSIS — Z3A.37 37 WEEKS GESTATION OF PREGNANCY: Primary | ICD-10-CM

## 2020-03-18 DIAGNOSIS — Z36.89 ENCOUNTER FOR ULTRASOUND TO ASSESS FETAL GROWTH: ICD-10-CM

## 2020-03-18 DIAGNOSIS — F11.20 METHADONE MAINTENANCE TREATMENT AFFECTING PREGNANCY IN THIRD TRIMESTER (HCC): ICD-10-CM

## 2020-03-18 DIAGNOSIS — O99.323 METHADONE MAINTENANCE TREATMENT AFFECTING PREGNANCY IN THIRD TRIMESTER (HCC): ICD-10-CM

## 2020-03-18 PROCEDURE — 99212 OFFICE O/P EST SF 10 MIN: CPT | Performed by: OBSTETRICS & GYNECOLOGY

## 2020-03-18 PROCEDURE — 76816 OB US FOLLOW-UP PER FETUS: CPT | Performed by: OBSTETRICS & GYNECOLOGY

## 2020-03-18 NOTE — PROGRESS NOTES
Pt called to confirm appointment and screen pt for COVID 19  Pt left message on VM to reschedule appointment if she is feeling ill, has fever, cough, SOB or has been in contact with a person with COVID 19  Pt also instructed to contact office with questions

## 2020-03-18 NOTE — PROGRESS NOTES
[address] [place] [address]: Ms Alvina Zavala was seen today at 37w1d for fetal growth assessment ultrasound  See ultrasound report under "OB Procedures" tab    Please don't hesitate to contact our office with any concerns or questions   -Venita Turner MD

## 2020-03-19 ENCOUNTER — HOSPITAL ENCOUNTER (OUTPATIENT)
Dept: INFUSION CENTER | Facility: CLINIC | Age: 30
End: 2020-03-19

## 2020-03-19 ENCOUNTER — TELEPHONE (OUTPATIENT)
Dept: OBGYN CLINIC | Facility: CLINIC | Age: 30
End: 2020-03-19

## 2020-03-19 NOTE — TELEPHONE ENCOUNTER
I called patient to confirm Friday's appointment and she would like to know if she still needs to be seen tomorrow? She was just seen yesterday (Wed 3/18) by M  She has her 38 week appointment scheduled for next Friday 3/27

## 2020-03-20 ENCOUNTER — HOSPITAL ENCOUNTER (OUTPATIENT)
Dept: INFUSION CENTER | Facility: CLINIC | Age: 30
Discharge: HOME | End: 2020-03-20
Payer: COMMERCIAL

## 2020-03-20 VITALS
OXYGEN SATURATION: 99 % | SYSTOLIC BLOOD PRESSURE: 116 MMHG | TEMPERATURE: 97.9 F | DIASTOLIC BLOOD PRESSURE: 78 MMHG | RESPIRATION RATE: 16 BRPM | HEART RATE: 96 BPM

## 2020-03-20 DIAGNOSIS — O99.013 ANEMIA AFFECTING PREGNANCY IN THIRD TRIMESTER: Primary | ICD-10-CM

## 2020-03-20 PROCEDURE — 96365 THER/PROPH/DIAG IV INF INIT: CPT

## 2020-03-20 PROCEDURE — 96372 THER/PROPH/DIAG INJ SC/IM: CPT

## 2020-03-20 RX ORDER — CYANOCOBALAMIN 1000 UG/ML
1000 INJECTION INTRAMUSCULAR; SUBCUTANEOUS ONCE
Status: COMPLETED | OUTPATIENT
Start: 2020-03-20 | End: 2020-03-20

## 2020-03-20 RX ORDER — CYANOCOBALAMIN 1000 UG/ML
1000 INJECTION INTRAMUSCULAR; SUBCUTANEOUS ONCE
Status: CANCELLED | OUTPATIENT
Start: 2020-03-24

## 2020-03-20 RX ORDER — SODIUM CHLORIDE 9 MG/ML
20 INJECTION, SOLUTION INTRAVENOUS ONCE
Status: CANCELLED | OUTPATIENT
Start: 2020-03-24

## 2020-03-20 RX ORDER — SODIUM CHLORIDE 9 MG/ML
20 INJECTION, SOLUTION INTRAVENOUS ONCE
Status: COMPLETED | OUTPATIENT
Start: 2020-03-20 | End: 2020-03-20

## 2020-03-20 RX ADMIN — IRON SUCROSE 200 MG: 20 INJECTION, SOLUTION INTRAVENOUS at 08:53

## 2020-03-20 RX ADMIN — SODIUM CHLORIDE 20 ML/HR: 0.9 INJECTION, SOLUTION INTRAVENOUS at 08:25

## 2020-03-20 RX ADMIN — CYANOCOBALAMIN 1000 MCG: 1000 INJECTION, SOLUTION INTRAMUSCULAR; SUBCUTANEOUS at 08:53

## 2020-03-20 NOTE — PROGRESS NOTES
Patient came to infusion center for Venofer and VitB 12  Patients vitals WNL    Patient denies pain  Patient offers no other complaints  Patient tolerated an IV to her Left arm  Patients fluids infusing  Patient confirmed her next appt and declined her AVS at this time  Patient given call bell and encouraged to call if need anything  Will continue to monitor

## 2020-03-24 ENCOUNTER — HOSPITAL ENCOUNTER (OUTPATIENT)
Dept: INFUSION CENTER | Facility: CLINIC | Age: 30
Discharge: HOME | End: 2020-03-24
Payer: COMMERCIAL

## 2020-03-24 VITALS
HEART RATE: 86 BPM | OXYGEN SATURATION: 100 % | TEMPERATURE: 98.3 F | SYSTOLIC BLOOD PRESSURE: 112 MMHG | DIASTOLIC BLOOD PRESSURE: 81 MMHG | RESPIRATION RATE: 18 BRPM

## 2020-03-24 DIAGNOSIS — O99.013 ANEMIA AFFECTING PREGNANCY IN THIRD TRIMESTER: Primary | ICD-10-CM

## 2020-03-24 PROCEDURE — 96365 THER/PROPH/DIAG IV INF INIT: CPT

## 2020-03-24 PROCEDURE — 96372 THER/PROPH/DIAG INJ SC/IM: CPT

## 2020-03-24 RX ORDER — CYANOCOBALAMIN 1000 UG/ML
1000 INJECTION INTRAMUSCULAR; SUBCUTANEOUS ONCE
Status: COMPLETED | OUTPATIENT
Start: 2020-03-24 | End: 2020-03-24

## 2020-03-24 RX ORDER — SODIUM CHLORIDE 9 MG/ML
20 INJECTION, SOLUTION INTRAVENOUS ONCE
Status: CANCELLED | OUTPATIENT
Start: 2020-03-26

## 2020-03-24 RX ORDER — CYANOCOBALAMIN 1000 UG/ML
1000 INJECTION INTRAMUSCULAR; SUBCUTANEOUS ONCE
Status: CANCELLED | OUTPATIENT
Start: 2020-03-26

## 2020-03-24 RX ORDER — SODIUM CHLORIDE 9 MG/ML
20 INJECTION, SOLUTION INTRAVENOUS ONCE
Status: COMPLETED | OUTPATIENT
Start: 2020-03-24 | End: 2020-03-24

## 2020-03-24 RX ADMIN — SODIUM CHLORIDE 20 ML/HR: 0.9 INJECTION, SOLUTION INTRAVENOUS at 14:30

## 2020-03-24 RX ADMIN — IRON SUCROSE 200 MG: 20 INJECTION, SOLUTION INTRAVENOUS at 14:50

## 2020-03-24 RX ADMIN — CYANOCOBALAMIN 1000 MCG: 1000 INJECTION, SOLUTION INTRAMUSCULAR; SUBCUTANEOUS at 16:00

## 2020-03-26 ENCOUNTER — HOSPITAL ENCOUNTER (OUTPATIENT)
Dept: INFUSION CENTER | Facility: CLINIC | Age: 30
Discharge: HOME | End: 2020-03-26
Payer: COMMERCIAL

## 2020-03-26 ENCOUNTER — ROUTINE PRENATAL (OUTPATIENT)
Dept: OBGYN CLINIC | Facility: CLINIC | Age: 30
End: 2020-03-26

## 2020-03-26 VITALS — BODY MASS INDEX: 34.67 KG/M2 | DIASTOLIC BLOOD PRESSURE: 74 MMHG | WEIGHT: 202 LBS | SYSTOLIC BLOOD PRESSURE: 122 MMHG

## 2020-03-26 VITALS
DIASTOLIC BLOOD PRESSURE: 69 MMHG | HEART RATE: 81 BPM | RESPIRATION RATE: 18 BRPM | SYSTOLIC BLOOD PRESSURE: 113 MMHG | TEMPERATURE: 98.3 F

## 2020-03-26 DIAGNOSIS — O99.013 ANEMIA AFFECTING PREGNANCY IN THIRD TRIMESTER: Primary | ICD-10-CM

## 2020-03-26 DIAGNOSIS — O99.013 ANEMIA AFFECTING PREGNANCY IN THIRD TRIMESTER: ICD-10-CM

## 2020-03-26 DIAGNOSIS — O09.33 LATE PRENATAL CARE AFFECTING PREGNANCY IN THIRD TRIMESTER: ICD-10-CM

## 2020-03-26 DIAGNOSIS — Z3A.38 38 WEEKS GESTATION OF PREGNANCY: ICD-10-CM

## 2020-03-26 DIAGNOSIS — O09.893 SHORT INTERVAL BETWEEN PREGNANCIES AFFECTING PREGNANCY IN THIRD TRIMESTER, ANTEPARTUM: ICD-10-CM

## 2020-03-26 DIAGNOSIS — O09.892 PRIOR PREGNANCY COMPLICATED BY PIH, ANTEPARTUM, SECOND TRIMESTER: ICD-10-CM

## 2020-03-26 DIAGNOSIS — O09.293 PRIOR PREGNANCY COMPLICATED BY IUGR, ANTEPARTUM, THIRD TRIMESTER: ICD-10-CM

## 2020-03-26 PROCEDURE — PNV: Performed by: OBSTETRICS & GYNECOLOGY

## 2020-03-26 PROCEDURE — 96372 THER/PROPH/DIAG INJ SC/IM: CPT

## 2020-03-26 PROCEDURE — 96365 THER/PROPH/DIAG IV INF INIT: CPT

## 2020-03-26 RX ORDER — SODIUM CHLORIDE 9 MG/ML
20 INJECTION, SOLUTION INTRAVENOUS ONCE
Status: CANCELLED | OUTPATIENT
Start: 2020-03-31

## 2020-03-26 RX ORDER — CYANOCOBALAMIN 1000 UG/ML
1000 INJECTION INTRAMUSCULAR; SUBCUTANEOUS ONCE
Status: COMPLETED | OUTPATIENT
Start: 2020-03-26 | End: 2020-03-26

## 2020-03-26 RX ORDER — SODIUM CHLORIDE 9 MG/ML
20 INJECTION, SOLUTION INTRAVENOUS ONCE
Status: COMPLETED | OUTPATIENT
Start: 2020-03-26 | End: 2020-03-26

## 2020-03-26 RX ORDER — CYANOCOBALAMIN 1000 UG/ML
1000 INJECTION INTRAMUSCULAR; SUBCUTANEOUS ONCE
Status: CANCELLED | OUTPATIENT
Start: 2020-03-31

## 2020-03-26 RX ADMIN — SODIUM CHLORIDE 20 ML/HR: 0.9 INJECTION, SOLUTION INTRAVENOUS at 14:54

## 2020-03-26 RX ADMIN — IRON SUCROSE 200 MG: 20 INJECTION, SOLUTION INTRAVENOUS at 14:59

## 2020-03-26 RX ADMIN — CYANOCOBALAMIN 1000 MCG: 1000 INJECTION, SOLUTION INTRAMUSCULAR; SUBCUTANEOUS at 16:03

## 2020-03-26 NOTE — PROGRESS NOTES
This is a 30 y o   at 38w2d who presents for return OB visit  No complaints  Denies contractions, leakage, bleeding  Endorses fetal movement   BP: 122/74 TWlb    Planned c/s on  - now with Dr Hu Sparks  Discussed entry to [facility] hospital, NPO after midnight  OB Covid FAQs provided in AVS    Discussed coronavirus precautions - hand washing, social distancing, symptoms to call PCP, limited visitation in office and on L&D, entry into the hospital, possibility of telehealth visits, changed L&D coverage

## 2020-03-26 NOTE — PATIENT INSTRUCTIONS
Coronavirus (DWONV-23) pregnancy FAQs: Medical experts answer your questions  From [employer] cy  [email or web address]_[email or web address]-[id number]  bc (courtesy of [facility])  As of 3/18/20  By Pelon Marcano 39  Medically reviewed by Society for Maternal-Fetal Medicine       We asked parents-to-be to send us their most pressing questions about coronavirus (COVID-19)  Among them: Is it still safe to deliver in a hospital? What if my ob-gyn has the virus? We sent those questions to the top docs at the Society for Maternal-Fetal Medicine  Here are their answers  The coronavirus (COVID-19) pandemic has been declared a national emergency in the [facility] by [employer]  Moms-to-be like you are concerned about everything from getting medicines to managing disruptions at work  But above and beyond any worry about lifestyle changes is a focus on your health and the impact of COVID-19 on your pregnancy  We asked obstetrics doctors who handle the most complicated pregnancy issues to answer your questions about the coronavirus  Here are their responses, provided by Dr Elijah Jolly and her colleagues at the Society for [place] 250  Am I at more risk for COVID-19 if I'm pregnant? We don't know  Pregnancy does change your immune system in ways that might make you more susceptible to viral respiratory infections like COVID-19  If you become infected, you might also be at higher risk for more severe illness compared to the general population  Although this does not appear to be the case with COVID-19, based on limited data so far, a higher risk has been true for pregnant women with other coronavirus infections (SARS-CoV and MERS-CoV) and other viral respiratory infections, such as flu  It's important to take preventive actions to avoid infection, such as washing your hands often and avoiding people who are sick      How might coronavirus affect my pregnancy? Again, we don't know  Women with other coronavirus infections (such as SARS-CoV) did not have miscarriage or stillbirth at higher rates than the general population  We know that having other respiratory viral infections during pregnancy, such as flu, has been associated with problems like low birth weight and  birth  Also, having a high fever early in pregnancy may increase the risk of certain birth defects  Could I transmit coronavirus to my baby during pregnancy or delivery? We don't know whether you could transmit COVID-19 to your baby before or during delivery  However, among the few case studies of infants born to mothers with COVID-23 published in peer-reviewed literature, none of the infants tested positive for the virus  Also, there was no virus detected in samples of amniotic fluid or breast milk  There have been a few reports of newborns as young as a few days old with infection, suggesting that a mother can transmit the infection to her infant through close contact after delivery  There have been no reports of mother-to-baby transmission for other coronaviruses (MERS-CoV and SARS-CoV), although only limited information is available  Is it safe for me to deliver at a hospital where there have been COVID-19 cases? Yes  We know that COVID-19 is a very scary virus  The good news is that hospitals are taking great precautions to keep patients and healthcare providers safe  When a patient is even suspected to have COVID-19, they are placed in a negative pressure room  (Think of these rooms as vacuums that suck and filter the air so it's safe for the other people in the hospital)  This makes it possible for you to deliver at the hospital without putting you or your baby at risk  Hospitals are also implementing stricter visiting policies to keep patients safe  It's worth calling your hospital to check if there are any new regulations to be aware of      What plans should I make now in case the hospital system is overwhelmed when it's time for me to deliver? This is a great question to talk with your doctor or midwife about when you're 34 to 36 weeks pregnant  Every hospital is making different plans for dealing with this scenario  I work in healthcare  Should I ask my doctor to excuse me from work until the baby is born? What if I work in a school, the travel [employer], or some other high-risk setting? Healthcare facilities should take care to limit the exposure of pregnant employees to patients with confirmed or suspected COVID-19, just as they would with other infectious cases  If you continue working, be sure to follow the CDC's risk assessment and infection control guidelines  If you work in a school, travel [employer], or other high-risk setting, talk with your employer about what it's doing to protect employees and minimize infection risks  Wash your hands often  What if my OB gets COVID-19? If your doctor or midwife tests positive for COVID-19, they will need to be quarantined until they recover and are no longer at risk of transmitting the virus  In this case, you'll be assigned to another OB in your doctor's practice (or you may choose another practitioner yourself)  Ask your new OB or your doctor's office if you should self-quarantine or be tested for the virus  (It will depend on when you last saw your provider and when that person tested positive )    Should we hold off on trying to conceive because of COVID-19? At this time, there's no reason to hold off on trying to get pregnant, but the data we have is really limited  For example, we don't think the virus causes birth defects or increases your risk of miscarriage  But we don't know whether you could transmit COVID-19 to your baby before or during delivery  We also don't know if the virus lives in semen or can be sexually transmitted      We have a babymoon scheduled in the next few months - should we cancel? If you're planning to travel internationally or on a cruise, you should strongly consider canceling  At this time, the virus has reached more than 140 countries, and there are travel bans to [place], most of [place], and [place]  Places where large numbers of people gather are at highest risk, especially airports and cruise ships  If you're planning travel in the [place] , note that any travel setting increases your risk of exposure, and there may be travel bans even in [place] by the time you're scheduled to go  Even if you're state is not blocked, you'll definitely want to avoid traveling to communities where the virus is spreading  To find out where these places are, check The [place] Times map based on CDC data  For the most current advice to help you avoid exposure, check the CDC's COVID-19 travel page  Will the hospital separate me from my  and keep the baby in quarantine? If you test positive for COVID-19 or have been exposed but have no symptoms, the CDC, [employer] of Obstetricians and Gynecologists, and the Society for [place] 250 all recommend that you be  from your baby to decrease the risk of transmission to the baby  This would last until you are no longer at risk of transmitting the virus  If you do not have COVID-19 and have not been exposed to the virus, the hospital will not separate you from your   My hospital is restricting visitors and only allowing one support person  If my support person leaves after the delivery, will they be allowed to come back? Every hospital has different policies  Contact your hospital or labor and delivery unit a week or so before delivery to get the most up-to-date restrictions  In general, if your support person needs to leave, they would be allowed back unless they knew they were exposed to COVID-19 after leaving your company    [facility] understands that the coronavirus (COVID-19) pandemic is an evolving story and that your questions will change over time  We'll continue asking moms and dads in our Community what they want to know, and we'll get the answers from experts to keep them - and you - informed and supported  My mom was planning to fly here to help me care for my new baby after delivery  Should I tell her not to come? Yes  If your mom is over 61 or has any serious chronic medical conditions (such as heart disease, lung disease, or diabetes), she is at higher risk of serious illness from COVID-19 and should avoid air travel  And remember that any travel setting increases a person's risk of exposure  So, it may be risky to have her around the baby after she has been traveling  For the most current advice on traveling, check the CDC's COVID-19 travel page  [facility] understands that the coronavirus pandemic is an evolving story and that your questions will change over time  We'll continue asking moms and dads in our Community what they want to know, and we'll get the answers from experts to keep them - and you - informed and supported

## 2020-03-30 ENCOUNTER — TELEPHONE (OUTPATIENT)
Dept: OBGYN CLINIC | Facility: CLINIC | Age: 30
End: 2020-03-30

## 2020-03-31 ENCOUNTER — HOSPITAL ENCOUNTER (OUTPATIENT)
Dept: INFUSION CENTER | Facility: CLINIC | Age: 30
Discharge: HOME | DRG: 540 | End: 2020-03-31
Payer: COMMERCIAL

## 2020-03-31 VITALS
TEMPERATURE: 97.8 F | HEART RATE: 86 BPM | SYSTOLIC BLOOD PRESSURE: 102 MMHG | OXYGEN SATURATION: 98 % | RESPIRATION RATE: 14 BRPM | DIASTOLIC BLOOD PRESSURE: 70 MMHG

## 2020-03-31 DIAGNOSIS — O99.013 ANEMIA AFFECTING PREGNANCY IN THIRD TRIMESTER: Primary | ICD-10-CM

## 2020-03-31 PROCEDURE — 96365 THER/PROPH/DIAG IV INF INIT: CPT

## 2020-03-31 PROCEDURE — 96372 THER/PROPH/DIAG INJ SC/IM: CPT

## 2020-03-31 RX ORDER — CYANOCOBALAMIN 1000 UG/ML
1000 INJECTION INTRAMUSCULAR; SUBCUTANEOUS ONCE
Status: CANCELLED | OUTPATIENT
Start: 2020-03-31

## 2020-03-31 RX ORDER — SODIUM CHLORIDE 9 MG/ML
20 INJECTION, SOLUTION INTRAVENOUS ONCE
Status: CANCELLED | OUTPATIENT
Start: 2020-03-31

## 2020-03-31 RX ORDER — SODIUM CHLORIDE 9 MG/ML
20 INJECTION, SOLUTION INTRAVENOUS ONCE
Status: COMPLETED | OUTPATIENT
Start: 2020-03-31 | End: 2020-03-31

## 2020-03-31 RX ORDER — CYANOCOBALAMIN 1000 UG/ML
1000 INJECTION INTRAMUSCULAR; SUBCUTANEOUS ONCE
Status: COMPLETED | OUTPATIENT
Start: 2020-03-31 | End: 2020-03-31

## 2020-03-31 RX ADMIN — CYANOCOBALAMIN 1000 MCG: 1000 INJECTION, SOLUTION INTRAMUSCULAR; SUBCUTANEOUS at 15:45

## 2020-03-31 RX ADMIN — IRON SUCROSE 200 MG: 20 INJECTION, SOLUTION INTRAVENOUS at 14:31

## 2020-03-31 RX ADMIN — SODIUM CHLORIDE 20 ML/HR: 0.9 INJECTION, SOLUTION INTRAVENOUS at 14:31

## 2020-04-02 ENCOUNTER — ANESTHESIA (INPATIENT)
Dept: LABOR AND DELIVERY | Facility: HOSPITAL | Age: 30
DRG: 540 | End: 2020-04-02
Payer: COMMERCIAL

## 2020-04-02 ENCOUNTER — HOSPITAL ENCOUNTER (INPATIENT)
Facility: HOSPITAL | Age: 30
LOS: 1 days | Discharge: HOME | DRG: 540 | End: 2020-04-03
Attending: OBSTETRICS & GYNECOLOGY | Admitting: OBSTETRICS & GYNECOLOGY
Payer: COMMERCIAL

## 2020-04-02 DIAGNOSIS — Z98.891 STATUS POST REPEAT LOW TRANSVERSE CESAREAN SECTION: Primary | ICD-10-CM

## 2020-04-02 DIAGNOSIS — O34.219 PREVIOUS CESAREAN SECTION COMPLICATING PREGNANCY: ICD-10-CM

## 2020-04-02 PROBLEM — Z3A.39 39 WEEKS GESTATION OF PREGNANCY: Status: ACTIVE | Noted: 2019-12-18

## 2020-04-02 LAB
ABO GROUP BLD: NORMAL
AMPHETAMINES SERPL QL SCN: NEGATIVE
BARBITURATES UR QL: NEGATIVE
BASE EXCESS BLDCOA CALC-SCNC: -2.6 MMOL/L (ref 3–11)
BASE EXCESS BLDCOV CALC-SCNC: -0.3 MMOL/L (ref 1–9)
BASOPHILS # BLD AUTO: 0.05 THOUSANDS/ΜL (ref 0–0.1)
BASOPHILS NFR BLD AUTO: 1 % (ref 0–1)
BENZODIAZ UR QL: NEGATIVE
BLD GP AB SCN SERPL QL: NEGATIVE
COCAINE UR QL: NEGATIVE
EOSINOPHIL # BLD AUTO: 0.01 THOUSAND/ΜL (ref 0–0.61)
EOSINOPHIL NFR BLD AUTO: 0 % (ref 0–6)
ERYTHROCYTE [DISTWIDTH] IN BLOOD BY AUTOMATED COUNT: 21.4 % (ref 11.6–15.1)
HCO3 BLDCOA-SCNC: 25.1 MMOL/L (ref 17.3–27.3)
HCO3 BLDCOV-SCNC: 25.1 MMOL/L (ref 12.2–28.6)
HCT VFR BLD AUTO: 41.5 % (ref 34.8–46.1)
HGB BLD-MCNC: 13.1 G/DL (ref 11.5–15.4)
IMM GRANULOCYTES # BLD AUTO: 0.07 THOUSAND/UL (ref 0–0.2)
IMM GRANULOCYTES NFR BLD AUTO: 1 % (ref 0–2)
LYMPHOCYTES # BLD AUTO: 2.19 THOUSANDS/ΜL (ref 0.6–4.47)
LYMPHOCYTES NFR BLD AUTO: 21 % (ref 14–44)
MCH RBC QN AUTO: 29.1 PG (ref 26.8–34.3)
MCHC RBC AUTO-ENTMCNC: 31.6 G/DL (ref 31.4–37.4)
MCV RBC AUTO: 92 FL (ref 82–98)
METHADONE UR QL: POSITIVE
MONOCYTES # BLD AUTO: 0.78 THOUSAND/ΜL (ref 0.17–1.22)
MONOCYTES NFR BLD AUTO: 7 % (ref 4–12)
NEUTROPHILS # BLD AUTO: 7.58 THOUSANDS/ΜL (ref 1.85–7.62)
NEUTS SEG NFR BLD AUTO: 70 % (ref 43–75)
NRBC BLD AUTO-RTO: 0 /100 WBCS
O2 CT VFR BLDCOA CALC: 3.4 ML/DL
OPIATES UR QL SCN: NEGATIVE
OXYHGB MFR BLDCOA: 19.4 %
OXYHGB MFR BLDCOV: 57.4 %
PCO2 BLDCOA: 56.5 MM[HG] (ref 30–60)
PCO2 BLDCOV: 43.9 MM HG (ref 27–43)
PCP UR QL: NEGATIVE
PH BLDCOA: 7.26 [PH] (ref 7.23–7.43)
PH BLDCOV: 7.38 [PH] (ref 7.19–7.49)
PLATELET # BLD AUTO: 234 THOUSANDS/UL (ref 149–390)
PMV BLD AUTO: 10.9 FL (ref 8.9–12.7)
PO2 BLDCOA: 14.3 MM HG (ref 5–25)
PO2 BLDCOV: 23.6 MM HG (ref 15–45)
RBC # BLD AUTO: 4.5 MILLION/UL (ref 3.81–5.12)
RH BLD: POSITIVE
RPR SER QL: NORMAL
SAO2 % BLDCOV: 10 ML/DL
SPECIMEN EXPIRATION DATE: NORMAL
THC UR QL: NEGATIVE
WBC # BLD AUTO: 10.68 THOUSAND/UL (ref 4.31–10.16)

## 2020-04-02 PROCEDURE — 85025 COMPLETE CBC W/AUTO DIFF WBC: CPT | Performed by: OBSTETRICS & GYNECOLOGY

## 2020-04-02 PROCEDURE — NC001 PR NO CHARGE: Performed by: ANESTHESIOLOGY

## 2020-04-02 PROCEDURE — 0UH97HZ INSERTION OF CONTRACEPTIVE DEVICE INTO UTERUS, VIA NATURAL OR ARTIFICIAL OPENING: ICD-10-PCS | Performed by: OBSTETRICS & GYNECOLOGY

## 2020-04-02 PROCEDURE — 99024 POSTOP FOLLOW-UP VISIT: CPT | Performed by: OBSTETRICS & GYNECOLOGY

## 2020-04-02 PROCEDURE — 86901 BLOOD TYPING SEROLOGIC RH(D): CPT | Performed by: OBSTETRICS & GYNECOLOGY

## 2020-04-02 PROCEDURE — 82805 BLOOD GASES W/O2 SATURATION: CPT | Performed by: OBSTETRICS & GYNECOLOGY

## 2020-04-02 PROCEDURE — 86592 SYPHILIS TEST NON-TREP QUAL: CPT | Performed by: OBSTETRICS & GYNECOLOGY

## 2020-04-02 PROCEDURE — 86850 RBC ANTIBODY SCREEN: CPT | Performed by: OBSTETRICS & GYNECOLOGY

## 2020-04-02 PROCEDURE — 80307 DRUG TEST PRSMV CHEM ANLYZR: CPT | Performed by: OBSTETRICS & GYNECOLOGY

## 2020-04-02 PROCEDURE — 86900 BLOOD TYPING SEROLOGIC ABO: CPT | Performed by: OBSTETRICS & GYNECOLOGY

## 2020-04-02 PROCEDURE — 59514 CESAREAN DELIVERY ONLY: CPT | Performed by: OBSTETRICS & GYNECOLOGY

## 2020-04-02 RX ORDER — LIDOCAINE HYDROCHLORIDE 10 MG/ML
30 INJECTION, SOLUTION EPIDURAL; INFILTRATION; INTRACAUDAL; PERINEURAL ONCE
Status: COMPLETED | OUTPATIENT
Start: 2020-04-02 | End: 2020-04-02

## 2020-04-02 RX ORDER — SIMETHICONE 80 MG
80 TABLET,CHEWABLE ORAL EVERY 6 HOURS PRN
Status: DISCONTINUED | OUTPATIENT
Start: 2020-04-02 | End: 2020-04-03 | Stop reason: HOSPADM

## 2020-04-02 RX ORDER — DOCUSATE SODIUM 100 MG/1
100 CAPSULE, LIQUID FILLED ORAL 2 TIMES DAILY
Status: DISCONTINUED | OUTPATIENT
Start: 2020-04-02 | End: 2020-04-03 | Stop reason: HOSPADM

## 2020-04-02 RX ORDER — ACETAMINOPHEN 325 MG/1
650 TABLET ORAL EVERY 6 HOURS SCHEDULED
Status: DISCONTINUED | OUTPATIENT
Start: 2020-04-03 | End: 2020-04-03 | Stop reason: HOSPADM

## 2020-04-02 RX ORDER — OXYTOCIN/RINGER'S LACTATE 30/500 ML
PLASTIC BAG, INJECTION (ML) INTRAVENOUS CONTINUOUS PRN
Status: DISCONTINUED | OUTPATIENT
Start: 2020-04-02 | End: 2020-04-02 | Stop reason: SURG

## 2020-04-02 RX ORDER — ONDANSETRON 2 MG/ML
INJECTION INTRAMUSCULAR; INTRAVENOUS AS NEEDED
Status: DISCONTINUED | OUTPATIENT
Start: 2020-04-02 | End: 2020-04-02 | Stop reason: SURG

## 2020-04-02 RX ORDER — ONDANSETRON 2 MG/ML
4 INJECTION INTRAMUSCULAR; INTRAVENOUS EVERY 8 HOURS PRN
Status: DISCONTINUED | OUTPATIENT
Start: 2020-04-02 | End: 2020-04-02

## 2020-04-02 RX ORDER — OXYTOCIN/RINGER'S LACTATE 30/500 ML
62.5 PLASTIC BAG, INJECTION (ML) INTRAVENOUS ONCE
Status: COMPLETED | OUTPATIENT
Start: 2020-04-02 | End: 2020-04-02

## 2020-04-02 RX ORDER — METHADONE HYDROCHLORIDE 10 MG/ML
14 CONCENTRATE ORAL EVERY 24 HOURS
Status: DISCONTINUED | OUTPATIENT
Start: 2020-04-02 | End: 2020-04-03 | Stop reason: HOSPADM

## 2020-04-02 RX ORDER — DEXAMETHASONE SODIUM PHOSPHATE 4 MG/ML
INJECTION, SOLUTION INTRA-ARTICULAR; INTRALESIONAL; INTRAMUSCULAR; INTRAVENOUS; SOFT TISSUE AS NEEDED
Status: DISCONTINUED | OUTPATIENT
Start: 2020-04-02 | End: 2020-04-02 | Stop reason: SURG

## 2020-04-02 RX ORDER — ONDANSETRON 2 MG/ML
4 INJECTION INTRAMUSCULAR; INTRAVENOUS EVERY 4 HOURS PRN
Status: ACTIVE | OUTPATIENT
Start: 2020-04-02 | End: 2020-04-03

## 2020-04-02 RX ORDER — ACETAMINOPHEN 325 MG/1
650 TABLET ORAL EVERY 6 HOURS
Status: DISPENSED | OUTPATIENT
Start: 2020-04-02 | End: 2020-04-03

## 2020-04-02 RX ORDER — FENTANYL CITRATE/PF 50 MCG/ML
50 SYRINGE (ML) INJECTION
Status: DISCONTINUED | OUTPATIENT
Start: 2020-04-02 | End: 2020-04-03 | Stop reason: HOSPADM

## 2020-04-02 RX ORDER — METHADONE HYDROCHLORIDE 10 MG/1
29 TABLET ORAL EVERY 24 HOURS
Status: DISCONTINUED | OUTPATIENT
Start: 2020-04-03 | End: 2020-04-02 | Stop reason: SDUPTHER

## 2020-04-02 RX ORDER — DIPHENHYDRAMINE HYDROCHLORIDE 50 MG/ML
25 INJECTION INTRAMUSCULAR; INTRAVENOUS EVERY 6 HOURS PRN
Status: ACTIVE | OUTPATIENT
Start: 2020-04-02 | End: 2020-04-03

## 2020-04-02 RX ORDER — TRISODIUM CITRATE DIHYDRATE AND CITRIC ACID MONOHYDRATE 500; 334 MG/5ML; MG/5ML
30 SOLUTION ORAL ONCE
Status: DISCONTINUED | OUTPATIENT
Start: 2020-04-02 | End: 2020-04-02

## 2020-04-02 RX ORDER — KETOROLAC TROMETHAMINE 30 MG/ML
INJECTION, SOLUTION INTRAMUSCULAR; INTRAVENOUS AS NEEDED
Status: DISCONTINUED | OUTPATIENT
Start: 2020-04-02 | End: 2020-04-02 | Stop reason: SURG

## 2020-04-02 RX ORDER — BUPIVACAINE HYDROCHLORIDE 7.5 MG/ML
INJECTION, SOLUTION INTRASPINAL AS NEEDED
Status: DISCONTINUED | OUTPATIENT
Start: 2020-04-02 | End: 2020-04-02 | Stop reason: SURG

## 2020-04-02 RX ORDER — IBUPROFEN 600 MG/1
600 TABLET ORAL EVERY 6 HOURS PRN
Status: DISCONTINUED | OUTPATIENT
Start: 2020-04-03 | End: 2020-04-03 | Stop reason: HOSPADM

## 2020-04-02 RX ORDER — NALOXONE HYDROCHLORIDE 0.4 MG/ML
0.1 INJECTION, SOLUTION INTRAMUSCULAR; INTRAVENOUS; SUBCUTANEOUS
Status: ACTIVE | OUTPATIENT
Start: 2020-04-02 | End: 2020-04-03

## 2020-04-02 RX ORDER — HYDROXYZINE HYDROCHLORIDE 25 MG/1
25 TABLET, FILM COATED ORAL EVERY 6 HOURS PRN
Status: DISCONTINUED | OUTPATIENT
Start: 2020-04-02 | End: 2020-04-03 | Stop reason: HOSPADM

## 2020-04-02 RX ORDER — METHADONE HYDROCHLORIDE 10 MG/ML
29 CONCENTRATE ORAL EVERY 24 HOURS
Status: DISCONTINUED | OUTPATIENT
Start: 2020-04-03 | End: 2020-04-03 | Stop reason: HOSPADM

## 2020-04-02 RX ORDER — ONDANSETRON 2 MG/ML
4 INJECTION INTRAMUSCULAR; INTRAVENOUS ONCE AS NEEDED
Status: DISCONTINUED | OUTPATIENT
Start: 2020-04-02 | End: 2020-04-03 | Stop reason: HOSPADM

## 2020-04-02 RX ORDER — KETOROLAC TROMETHAMINE 30 MG/ML
30 INJECTION, SOLUTION INTRAMUSCULAR; INTRAVENOUS EVERY 6 HOURS SCHEDULED
Status: COMPLETED | OUTPATIENT
Start: 2020-04-02 | End: 2020-04-03

## 2020-04-02 RX ORDER — METOCLOPRAMIDE HYDROCHLORIDE 5 MG/ML
5 INJECTION INTRAMUSCULAR; INTRAVENOUS EVERY 6 HOURS PRN
Status: ACTIVE | OUTPATIENT
Start: 2020-04-02 | End: 2020-04-03

## 2020-04-02 RX ORDER — SODIUM CHLORIDE, SODIUM LACTATE, POTASSIUM CHLORIDE, CALCIUM CHLORIDE 600; 310; 30; 20 MG/100ML; MG/100ML; MG/100ML; MG/100ML
125 INJECTION, SOLUTION INTRAVENOUS CONTINUOUS
Status: DISCONTINUED | OUTPATIENT
Start: 2020-04-02 | End: 2020-04-03 | Stop reason: HOSPADM

## 2020-04-02 RX ORDER — CALCIUM CARBONATE 200(500)MG
1000 TABLET,CHEWABLE ORAL DAILY PRN
Status: DISCONTINUED | OUTPATIENT
Start: 2020-04-02 | End: 2020-04-03 | Stop reason: HOSPADM

## 2020-04-02 RX ORDER — OXYCODONE HYDROCHLORIDE 5 MG/1
5 TABLET ORAL EVERY 6 HOURS PRN
Status: DISCONTINUED | OUTPATIENT
Start: 2020-04-02 | End: 2020-04-03 | Stop reason: HOSPADM

## 2020-04-02 RX ORDER — IBUPROFEN 600 MG/1
600 TABLET ORAL EVERY 6 HOURS PRN
Status: DISCONTINUED | OUTPATIENT
Start: 2020-04-03 | End: 2020-04-02

## 2020-04-02 RX ORDER — DEXAMETHASONE SODIUM PHOSPHATE 4 MG/ML
8 INJECTION, SOLUTION INTRA-ARTICULAR; INTRALESIONAL; INTRAMUSCULAR; INTRAVENOUS; SOFT TISSUE ONCE AS NEEDED
Status: ACTIVE | OUTPATIENT
Start: 2020-04-02 | End: 2020-04-03

## 2020-04-02 RX ORDER — MORPHINE SULFATE 0.5 MG/ML
INJECTION, SOLUTION EPIDURAL; INTRATHECAL; INTRAVENOUS AS NEEDED
Status: DISCONTINUED | OUTPATIENT
Start: 2020-04-02 | End: 2020-04-02 | Stop reason: SURG

## 2020-04-02 RX ORDER — PROMETHAZINE HYDROCHLORIDE 25 MG/ML
25 INJECTION, SOLUTION INTRAMUSCULAR; INTRAVENOUS ONCE AS NEEDED
Status: DISCONTINUED | OUTPATIENT
Start: 2020-04-02 | End: 2020-04-03 | Stop reason: HOSPADM

## 2020-04-02 RX ORDER — CEFAZOLIN SODIUM 2 G/50ML
2000 SOLUTION INTRAVENOUS ONCE
Status: COMPLETED | OUTPATIENT
Start: 2020-04-02 | End: 2020-04-02

## 2020-04-02 RX ADMIN — DOCUSATE SODIUM 100 MG: 100 CAPSULE, LIQUID FILLED ORAL at 17:08

## 2020-04-02 RX ADMIN — SODIUM CHLORIDE, SODIUM LACTATE, POTASSIUM CHLORIDE, AND CALCIUM CHLORIDE 125 ML/HR: .6; .31; .03; .02 INJECTION, SOLUTION INTRAVENOUS at 07:13

## 2020-04-02 RX ADMIN — Medication 250 MILLI-UNITS/MIN: at 08:52

## 2020-04-02 RX ADMIN — SIMETHICONE CHEW TAB 80 MG 80 MG: 80 TABLET ORAL at 22:12

## 2020-04-02 RX ADMIN — HYDROXYZINE HYDROCHLORIDE 25 MG: 25 TABLET ORAL at 22:25

## 2020-04-02 RX ADMIN — KETOROLAC TROMETHAMINE 30 MG: 30 INJECTION, SOLUTION INTRAMUSCULAR at 22:12

## 2020-04-02 RX ADMIN — LIDOCAINE HYDROCHLORIDE 30 ML: 10 INJECTION, SOLUTION EPIDURAL; INFILTRATION; INTRACAUDAL; PERINEURAL at 09:30

## 2020-04-02 RX ADMIN — ACETAMINOPHEN 650 MG: 325 TABLET, FILM COATED ORAL at 15:36

## 2020-04-02 RX ADMIN — Medication 62.5 MILLI-UNITS/MIN: at 10:22

## 2020-04-02 RX ADMIN — LEVONORGESTREL 1 INTRA UTERINE DEVICE: 52 INTRAUTERINE DEVICE INTRAUTERINE at 08:56

## 2020-04-02 RX ADMIN — SODIUM CHLORIDE, SODIUM LACTATE, POTASSIUM CHLORIDE, AND CALCIUM CHLORIDE 125 ML/HR: .6; .31; .03; .02 INJECTION, SOLUTION INTRAVENOUS at 07:35

## 2020-04-02 RX ADMIN — ROPIVACAINE HYDROCHLORIDE: 2 INJECTION, SOLUTION EPIDURAL; INFILTRATION at 10:15

## 2020-04-02 RX ADMIN — MORPHINE SULFATE 0.3 MG: 0.5 INJECTION, SOLUTION EPIDURAL; INTRATHECAL; INTRAVENOUS at 08:29

## 2020-04-02 RX ADMIN — ACETAMINOPHEN 650 MG: 325 TABLET, FILM COATED ORAL at 22:12

## 2020-04-02 RX ADMIN — SODIUM CHLORIDE, SODIUM LACTATE, POTASSIUM CHLORIDE, AND CALCIUM CHLORIDE: .6; .31; .03; .02 INJECTION, SOLUTION INTRAVENOUS at 09:14

## 2020-04-02 RX ADMIN — PHENYLEPHRINE HYDROCHLORIDE 40 MCG/MIN: 10 INJECTION INTRAVENOUS at 08:30

## 2020-04-02 RX ADMIN — DEXAMETHASONE SODIUM PHOSPHATE 4 MG: 4 INJECTION, SOLUTION INTRAMUSCULAR; INTRAVENOUS at 08:53

## 2020-04-02 RX ADMIN — OXYCODONE HYDROCHLORIDE 5 MG: 5 TABLET ORAL at 20:12

## 2020-04-02 RX ADMIN — PHENYLEPHRINE HYDROCHLORIDE 100 MCG: 10 INJECTION INTRAVENOUS at 08:30

## 2020-04-02 RX ADMIN — BUPIVACAINE HYDROCHLORIDE IN DEXTROSE 1.6 ML: 7.5 INJECTION, SOLUTION SUBARACHNOID at 08:29

## 2020-04-02 RX ADMIN — CEFAZOLIN SODIUM 2000 MG: 2 SOLUTION INTRAVENOUS at 08:31

## 2020-04-02 RX ADMIN — KETOROLAC TROMETHAMINE 30 MG: 30 INJECTION, SOLUTION INTRAMUSCULAR at 15:34

## 2020-04-02 RX ADMIN — SODIUM CHLORIDE, SODIUM LACTATE, POTASSIUM CHLORIDE, AND CALCIUM CHLORIDE: .6; .31; .03; .02 INJECTION, SOLUTION INTRAVENOUS at 08:16

## 2020-04-02 RX ADMIN — KETOROLAC TROMETHAMINE 30 MG: 30 INJECTION, SOLUTION INTRAMUSCULAR at 09:14

## 2020-04-02 RX ADMIN — ONDANSETRON 4 MG: 2 INJECTION INTRAMUSCULAR; INTRAVENOUS at 08:53

## 2020-04-03 VITALS
SYSTOLIC BLOOD PRESSURE: 135 MMHG | TEMPERATURE: 98.5 F | RESPIRATION RATE: 18 BRPM | OXYGEN SATURATION: 97 % | DIASTOLIC BLOOD PRESSURE: 65 MMHG | HEART RATE: 84 BPM | HEIGHT: 64 IN | WEIGHT: 202 LBS | BODY MASS INDEX: 34.49 KG/M2

## 2020-04-03 LAB
ERYTHROCYTE [DISTWIDTH] IN BLOOD BY AUTOMATED COUNT: 20.9 % (ref 11.6–15.1)
HCT VFR BLD AUTO: 32.6 % (ref 34.8–46.1)
HGB BLD-MCNC: 10.6 G/DL (ref 11.5–15.4)
MCH RBC QN AUTO: 30.3 PG (ref 26.8–34.3)
MCHC RBC AUTO-ENTMCNC: 32.5 G/DL (ref 31.4–37.4)
MCV RBC AUTO: 93 FL (ref 82–98)
PLATELET # BLD AUTO: 196 THOUSANDS/UL (ref 149–390)
PMV BLD AUTO: 11.2 FL (ref 8.9–12.7)
RBC # BLD AUTO: 3.5 MILLION/UL (ref 3.81–5.12)
WBC # BLD AUTO: 14.17 THOUSAND/UL (ref 4.31–10.16)

## 2020-04-03 PROCEDURE — 85027 COMPLETE CBC AUTOMATED: CPT | Performed by: STUDENT IN AN ORGANIZED HEALTH CARE EDUCATION/TRAINING PROGRAM

## 2020-04-03 PROCEDURE — 99024 POSTOP FOLLOW-UP VISIT: CPT | Performed by: OBSTETRICS & GYNECOLOGY

## 2020-04-03 RX ORDER — IBUPROFEN 600 MG/1
600 TABLET ORAL EVERY 6 HOURS PRN
Qty: 30 TABLET | Refills: 0 | Status: SHIPPED | OUTPATIENT
Start: 2020-04-03 | End: 2020-07-15

## 2020-04-03 RX ORDER — OXYCODONE HYDROCHLORIDE 5 MG/1
5 TABLET ORAL EVERY 6 HOURS PRN
Qty: 15 TABLET | Refills: 0 | Status: SHIPPED | OUTPATIENT
Start: 2020-04-03 | End: 2020-04-03

## 2020-04-03 RX ORDER — ACETAMINOPHEN 325 MG/1
650 TABLET ORAL EVERY 6 HOURS
Qty: 30 TABLET | Refills: 0
Start: 2020-04-03 | End: 2020-04-04

## 2020-04-03 RX ORDER — OXYCODONE HYDROCHLORIDE 5 MG/1
5 TABLET ORAL EVERY 6 HOURS PRN
Qty: 15 TABLET | Refills: 0 | Status: SHIPPED | OUTPATIENT
Start: 2020-04-03 | End: 2020-04-13

## 2020-04-03 RX ADMIN — OXYCODONE HYDROCHLORIDE 5 MG: 5 TABLET ORAL at 02:20

## 2020-04-03 RX ADMIN — OXYCODONE HYDROCHLORIDE 5 MG: 5 TABLET ORAL at 20:28

## 2020-04-03 RX ADMIN — OXYCODONE HYDROCHLORIDE 5 MG: 5 TABLET ORAL at 14:30

## 2020-04-03 RX ADMIN — KETOROLAC TROMETHAMINE 30 MG: 30 INJECTION, SOLUTION INTRAMUSCULAR at 17:43

## 2020-04-03 RX ADMIN — ACETAMINOPHEN 650 MG: 325 TABLET, FILM COATED ORAL at 04:15

## 2020-04-03 RX ADMIN — METHADONE HYDROCHLORIDE 14 MG: 10 CONCENTRATE ORAL at 20:30

## 2020-04-03 RX ADMIN — KETOROLAC TROMETHAMINE 30 MG: 30 INJECTION, SOLUTION INTRAMUSCULAR at 10:37

## 2020-04-03 RX ADMIN — DOCUSATE SODIUM 100 MG: 100 CAPSULE, LIQUID FILLED ORAL at 17:44

## 2020-04-03 RX ADMIN — ACETAMINOPHEN 650 MG: 325 TABLET, FILM COATED ORAL at 17:44

## 2020-04-03 RX ADMIN — OXYCODONE HYDROCHLORIDE 5 MG: 5 TABLET ORAL at 08:29

## 2020-04-03 RX ADMIN — METHADONE HYDROCHLORIDE 29 MG: 10 CONCENTRATE ORAL at 08:20

## 2020-04-03 RX ADMIN — KETOROLAC TROMETHAMINE 30 MG: 30 INJECTION, SOLUTION INTRAMUSCULAR at 04:15

## 2020-04-03 RX ADMIN — ACETAMINOPHEN 650 MG: 325 TABLET, FILM COATED ORAL at 10:36

## 2020-04-03 RX ADMIN — DOCUSATE SODIUM 100 MG: 100 CAPSULE, LIQUID FILLED ORAL at 08:19

## 2020-04-03 RX ADMIN — SIMETHICONE CHEW TAB 80 MG 80 MG: 80 TABLET ORAL at 08:22

## 2020-04-09 LAB — PLACENTA IN STORAGE: NORMAL

## 2020-04-14 ENCOUNTER — TELEPHONE (OUTPATIENT)
Dept: OBGYN CLINIC | Facility: CLINIC | Age: 30
End: 2020-04-14

## 2020-05-11 NOTE — ADDENDUM NOTE
05/11/20 1100   C-SSRS (Frequent Screen)   2. Have you actually had any thoughts of killing yourself? No   6. Have you done anything, started to do anything, or prepared to do anything to end your life? No   Nursing Suicide Assessment Note - PHP    Current assessment:    Current C-SSRS score: (P) Negative screen= no ideation, behaviors or history     Protective Factors / Reason for Living:  family    Interventions:   -    Safety planning with treatment team.    Safety / Recovery Plan reviewed with patient. Patient verbalizes understanding .   Addendum  created 03/26/19 1547 by Tami Griffin MD    Order list changed

## 2020-06-03 ENCOUNTER — OFFICE VISIT (OUTPATIENT)
Dept: FAMILY MEDICINE CLINIC | Facility: CLINIC | Age: 30
End: 2020-06-03
Payer: COMMERCIAL

## 2020-06-03 VITALS
OXYGEN SATURATION: 96 % | DIASTOLIC BLOOD PRESSURE: 80 MMHG | TEMPERATURE: 99.2 F | SYSTOLIC BLOOD PRESSURE: 124 MMHG | RESPIRATION RATE: 18 BRPM | BODY MASS INDEX: 31.89 KG/M2 | WEIGHT: 186.8 LBS | HEIGHT: 64 IN | HEART RATE: 130 BPM

## 2020-06-03 DIAGNOSIS — F41.9 ANXIETY: ICD-10-CM

## 2020-06-03 PROBLEM — O09.892 PRIOR PREGNANCY COMPLICATED BY PIH, ANTEPARTUM, SECOND TRIMESTER: Status: RESOLVED | Noted: 2019-12-18 | Resolved: 2020-06-03

## 2020-06-03 PROBLEM — O09.293 PRIOR PREGNANCY COMPLICATED BY IUGR, ANTEPARTUM, THIRD TRIMESTER: Status: RESOLVED | Noted: 2019-12-18 | Resolved: 2020-06-03

## 2020-06-03 PROBLEM — Z98.891 STATUS POST REPEAT LOW TRANSVERSE CESAREAN SECTION: Status: RESOLVED | Noted: 2020-04-02 | Resolved: 2020-06-03

## 2020-06-03 PROBLEM — Z3A.39 39 WEEKS GESTATION OF PREGNANCY: Status: RESOLVED | Noted: 2019-12-18 | Resolved: 2020-06-03

## 2020-06-03 PROBLEM — O99.323 METHADONE MAINTENANCE TREATMENT AFFECTING PREGNANCY IN THIRD TRIMESTER (HCC): Status: RESOLVED | Noted: 2020-03-10 | Resolved: 2020-06-03

## 2020-06-03 PROBLEM — F11.20 METHADONE MAINTENANCE TREATMENT AFFECTING PREGNANCY IN THIRD TRIMESTER (HCC): Status: RESOLVED | Noted: 2020-03-10 | Resolved: 2020-06-03

## 2020-06-03 PROBLEM — O34.211 MATERNAL CARE DUE TO LOW TRANSVERSE UTERINE SCAR FROM PREVIOUS CESAREAN DELIVERY: Status: RESOLVED | Noted: 2019-12-18 | Resolved: 2020-06-03

## 2020-06-03 PROBLEM — O09.893 SHORT INTERVAL BETWEEN PREGNANCIES AFFECTING PREGNANCY IN THIRD TRIMESTER, ANTEPARTUM: Status: RESOLVED | Noted: 2019-12-18 | Resolved: 2020-06-03

## 2020-06-03 PROBLEM — O09.33 LATE PRENATAL CARE AFFECTING PREGNANCY IN THIRD TRIMESTER: Status: RESOLVED | Noted: 2019-12-18 | Resolved: 2020-06-03

## 2020-06-03 PROCEDURE — 99203 OFFICE O/P NEW LOW 30 MIN: CPT | Performed by: NURSE PRACTITIONER

## 2020-06-03 PROCEDURE — 1036F TOBACCO NON-USER: CPT | Performed by: NURSE PRACTITIONER

## 2020-06-03 RX ORDER — MULTIVIT-MIN/IRON FUM/FOLIC AC 7.5 MG-4
1 TABLET ORAL DAILY
COMMUNITY

## 2020-06-03 RX ORDER — CLONAZEPAM 0.5 MG/1
0.5 TABLET ORAL 2 TIMES DAILY PRN
Qty: 30 TABLET | Refills: 0 | Status: SHIPPED | OUTPATIENT
Start: 2020-06-03 | End: 2020-07-15 | Stop reason: SDUPTHER

## 2020-06-10 ENCOUNTER — TELEPHONE (OUTPATIENT)
Dept: FAMILY MEDICINE CLINIC | Facility: CLINIC | Age: 30
End: 2020-06-10

## 2020-06-18 DIAGNOSIS — F90.9 ATTENTION DEFICIT HYPERACTIVITY DISORDER (ADHD), UNSPECIFIED ADHD TYPE: Primary | ICD-10-CM

## 2020-06-18 RX ORDER — DEXTROAMPHETAMINE SACCHARATE, AMPHETAMINE ASPARTATE MONOHYDRATE, DEXTROAMPHETAMINE SULFATE AND AMPHETAMINE SULFATE 7.5; 7.5; 7.5; 7.5 MG/1; MG/1; MG/1; MG/1
30 CAPSULE, EXTENDED RELEASE ORAL EVERY MORNING
Qty: 30 CAPSULE | Refills: 0 | Status: SHIPPED | OUTPATIENT
Start: 2020-06-18 | End: 2020-07-15

## 2020-06-24 ENCOUNTER — OFFICE VISIT (OUTPATIENT)
Dept: URGENT CARE | Facility: CLINIC | Age: 30
End: 2020-06-24
Payer: COMMERCIAL

## 2020-06-24 VITALS
BODY MASS INDEX: 31.24 KG/M2 | WEIGHT: 183 LBS | HEIGHT: 64 IN | DIASTOLIC BLOOD PRESSURE: 74 MMHG | SYSTOLIC BLOOD PRESSURE: 119 MMHG | HEART RATE: 74 BPM | OXYGEN SATURATION: 96 % | TEMPERATURE: 97.5 F | RESPIRATION RATE: 18 BRPM

## 2020-06-24 DIAGNOSIS — S81.801A LEG WOUND, RIGHT, INITIAL ENCOUNTER: Primary | ICD-10-CM

## 2020-06-24 PROCEDURE — G0382 LEV 3 HOSP TYPE B ED VISIT: HCPCS | Performed by: NURSE PRACTITIONER

## 2020-06-24 PROCEDURE — 99203 OFFICE O/P NEW LOW 30 MIN: CPT | Performed by: NURSE PRACTITIONER

## 2020-06-24 PROCEDURE — 99283 EMERGENCY DEPT VISIT LOW MDM: CPT | Performed by: NURSE PRACTITIONER

## 2020-06-24 RX ORDER — CEPHALEXIN 500 MG/1
500 CAPSULE ORAL EVERY 12 HOURS SCHEDULED
Qty: 10 CAPSULE | Refills: 0 | Status: SHIPPED | OUTPATIENT
Start: 2020-06-24 | End: 2020-06-29

## 2020-07-02 DIAGNOSIS — F41.9 ANXIETY: ICD-10-CM

## 2020-07-02 RX ORDER — CLONAZEPAM 0.5 MG/1
0.5 TABLET ORAL 2 TIMES DAILY PRN
Qty: 30 TABLET | Refills: 0 | OUTPATIENT
Start: 2020-07-02

## 2020-07-04 DIAGNOSIS — F41.9 ANXIETY: ICD-10-CM

## 2020-07-06 RX ORDER — CLONAZEPAM 0.5 MG/1
0.5 TABLET ORAL 2 TIMES DAILY PRN
Qty: 30 TABLET | Refills: 0 | OUTPATIENT
Start: 2020-07-06

## 2020-07-06 NOTE — TELEPHONE ENCOUNTER
Medication:Clonazepam  PDMP   06/03/2020  2  06/03/2020  CLONAZEPAM 0 5 MG TABLET  30 0  15  DO MEN       Active agreement on file -No

## 2020-07-06 NOTE — TELEPHONE ENCOUNTER
Will need to discuss with Alejandra Fiddler Medication Requests         clonazePAM 0 5 mg Oral 2 times daily PRN

## 2020-07-07 ENCOUNTER — TELEPHONE (OUTPATIENT)
Dept: OBGYN CLINIC | Facility: CLINIC | Age: 30
End: 2020-07-07

## 2020-07-07 NOTE — TELEPHONE ENCOUNTER
C/o past two months spotting to light bleeding for the past two months  She had IUD  placed during C/S in April  Routing to provider

## 2020-07-07 NOTE — TELEPHONE ENCOUNTER
Spotting can be very normal  She did miss her PP visit where we would normally check the strings - should make an appt

## 2020-07-08 NOTE — TELEPHONE ENCOUNTER
Sorry  Dr Antolin Estrada thought she was  I do not want to prescribe this monthly  It was for one month to help and to be used sparingly  It should have lasted longer  If it needs to continue, she needs to see psychiatry  We started adderal for her  That is a controlled substance  Only one can be used at a time  She should just stay on the adderal   If that makes her anxious she will need to stop   She will need to see psychiatry for any other meds

## 2020-07-08 NOTE — TELEPHONE ENCOUNTER
Spoke with pt and she does have an apt with psychiatry but it is far out so she stated she would discuss this medication with Codie Keller at her apt on 7/13/20

## 2020-07-15 ENCOUNTER — OFFICE VISIT (OUTPATIENT)
Dept: FAMILY MEDICINE CLINIC | Facility: CLINIC | Age: 30
End: 2020-07-15
Payer: COMMERCIAL

## 2020-07-15 VITALS
DIASTOLIC BLOOD PRESSURE: 80 MMHG | RESPIRATION RATE: 18 BRPM | OXYGEN SATURATION: 98 % | WEIGHT: 184.2 LBS | HEIGHT: 64 IN | TEMPERATURE: 99.6 F | SYSTOLIC BLOOD PRESSURE: 126 MMHG | BODY MASS INDEX: 31.45 KG/M2 | HEART RATE: 85 BPM

## 2020-07-15 DIAGNOSIS — F90.9 ATTENTION DEFICIT HYPERACTIVITY DISORDER (ADHD), UNSPECIFIED ADHD TYPE: ICD-10-CM

## 2020-07-15 DIAGNOSIS — F41.9 ANXIETY: ICD-10-CM

## 2020-07-15 PROBLEM — T50.901A DRUG OVERDOSE: Status: RESOLVED | Noted: 2019-01-18 | Resolved: 2020-07-15

## 2020-07-15 PROCEDURE — 1036F TOBACCO NON-USER: CPT | Performed by: NURSE PRACTITIONER

## 2020-07-15 PROCEDURE — 3008F BODY MASS INDEX DOCD: CPT | Performed by: OBSTETRICS & GYNECOLOGY

## 2020-07-15 PROCEDURE — 99213 OFFICE O/P EST LOW 20 MIN: CPT | Performed by: NURSE PRACTITIONER

## 2020-07-15 RX ORDER — CLONAZEPAM 0.5 MG/1
0.5 TABLET ORAL 2 TIMES DAILY PRN
Qty: 30 TABLET | Refills: 0 | Status: SHIPPED | OUTPATIENT
Start: 2020-07-15

## 2020-07-15 RX ORDER — DEXTROAMPHETAMINE SACCHARATE, AMPHETAMINE ASPARTATE, DEXTROAMPHETAMINE SULFATE AND AMPHETAMINE SULFATE 7.5; 7.5; 7.5; 7.5 MG/1; MG/1; MG/1; MG/1
30 TABLET ORAL 2 TIMES DAILY
Qty: 60 TABLET | Refills: 0 | Status: SHIPPED | OUTPATIENT
Start: 2020-07-15 | End: 2020-08-11 | Stop reason: SDUPTHER

## 2020-07-15 NOTE — PROGRESS NOTES
Assessment/Plan:           Problem List Items Addressed This Visit        Other    Anxiety    Relevant Medications    clonazePAM (KlonoPIN) 0 5 mg tablet    Attention deficit hyperactivity disorder (ADHD)    Relevant Medications    amphetamine-dextroamphetamine (ADDERALL) 30 MG tablet            Subjective:      Patient ID: Sterling Angel is a 35 y o  female  Here for f/u to ADD, anxiety and depression  States adderal has improved her ability to concentrate  Working some now as well  Sertraline working well for her anxiety and depression  Discussed with patient use of benzodiazepines is habit forming and should be taken very sparingly  Had discussion with patient this script will be last and to use it sparingly, we will no longer prescribe them now that sertraline is working due to habit formation, she is in agreement  No si or hi  Overall feels well        The following portions of the patient's history were reviewed and updated as appropriate: allergies, current medications, past family history, past medical history, past social history, past surgical history and problem list     Review of Systems   Constitutional: Negative for fatigue and fever  HENT: Negative for congestion and postnasal drip  Eyes: Negative for photophobia and visual disturbance  Respiratory: Negative for cough and shortness of breath  Cardiovascular: Negative for chest pain and palpitations  Gastrointestinal: Negative for constipation and diarrhea  Genitourinary: Negative for dysuria and frequency  Musculoskeletal: Negative for arthralgias and myalgias  Skin: Negative for rash  Neurological: Negative for dizziness and headaches  Hematological: Negative for adenopathy  Psychiatric/Behavioral: Positive for decreased concentration  Negative for dysphoric mood and sleep disturbance  The patient is nervous/anxious            Objective:      /80 (BP Location: Left arm, Patient Position: Sitting, Cuff Size: Standard) Pulse 85   Temp 99 6 °F (37 6 °C) (Tympanic)   Resp 18   Ht 5' 4" (1 626 m)   Wt 83 6 kg (184 lb 3 2 oz)   SpO2 98%   BMI 31 62 kg/m²     Family History   Problem Relation Age of Onset    Other Mother         lupus    Hypertension Father     COPD Father     No Known Problems Brother     No Known Problems Maternal Grandmother     No Known Problems Maternal Grandfather     Diabetes Paternal Grandmother         type 2    Other Paternal Grandfather         obesity    No Known Problems Brother     No Known Problems Daughter     Cancer Maternal Aunt         thyroid     Past Medical History:   Diagnosis Date    Anxiety     Asthma     childhood    Cancer ([address] )     skin cancer    Depression     Disease of thyroid gland     nodules    Diverticulitis     Hypertension     LAST PREGNANCY    Insomnia     Varicella     childhood     Social History     Socioeconomic History    Marital status: Single     Spouse name: Mihaela Kennedy    Number of children: Not on file    Years of education: Associates    Highest education level: Not on file   Occupational History    Occupation: Not Employeed   Social Needs    Financial resource strain: Not on file    Food insecurity:     Worry: Not on file     Inability: Not on file    Transportation needs:     Medical: Not on file     Non-medical: Not on file   Tobacco Use    Smoking status: Former Smoker     Years: 10 00     Last attempt to quit: 2019     Years since quittin 5    Smokeless tobacco: Never Used    Tobacco comment: 3 cigarettes daily   Substance and Sexual Activity    Alcohol use: Not Currently     Comment: socially prior to confirmed pregnancy    Drug use: No     Types: Opium     Comment: hx IV heroin and pills; 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19, 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; currently on methadone    Sexual activity: Yes     Partners: Male     Birth control/protection: None     Comment: Tomasz   Lifestyle    Physical activity:     Days per week: Not on file     Minutes per session: Not on file    Stress: Not on file   Relationships    Social connections:     Talks on phone: Not on file     Gets together: Not on file     Attends Spiritism service: Not on file     Active member of club or organization: Not on file     Attends meetings of clubs or organizations: Not on file     Relationship status: Not on file    Intimate partner violence:     Fear of current or ex partner: Not on file     Emotionally abused: Not on file     Physically abused: Not on file     Forced sexual activity: Not on file   Other Topics Concern    Not on file   Social History Narrative    Not on file       Current Outpatient Medications:     cetirizine (ZyrTEC) 10 mg tablet, Take 10 mg by mouth daily, Disp: , Rfl:     Multiple Vitamins-Minerals (MULTIVITAMIN WITH MINERALS) tablet, Take 1 tablet by mouth daily, Disp: , Rfl:     sertraline (ZOLOFT) 50 mg tablet, Take 1 tablet (50 mg total) by mouth daily, Disp: 30 tablet, Rfl: 5    amphetamine-dextroamphetamine (ADDERALL) 30 MG tablet, Take 1 tablet (30 mg total) by mouth 2 (two) times a dayMax Daily Amount: 60 mg, Disp: 60 tablet, Rfl: 0    clonazePAM (KlonoPIN) 0 5 mg tablet, Take 1 tablet (0 5 mg total) by mouth 2 (two) times a day as needed for seizures, Disp: 30 tablet, Rfl: 0  No Known Allergies     Physical Exam   Constitutional: She is oriented to person, place, and time  She appears well-developed and well-nourished  HENT:   Head: Normocephalic and atraumatic  Right Ear: External ear normal    Left Ear: External ear normal    Nose: Nose normal    Mouth/Throat: Oropharynx is clear and moist    Eyes: Pupils are equal, round, and reactive to light  Conjunctivae and EOM are normal    Neck: Normal range of motion  Neck supple  No thyromegaly present  Cardiovascular: Normal rate, regular rhythm and normal heart sounds  Pulmonary/Chest: Effort normal and breath sounds normal    Abdominal: Soft   Bowel sounds are normal    Musculoskeletal: Normal range of motion  Neurological: She is alert and oriented to person, place, and time  Skin: Skin is warm and dry  Capillary refill takes less than 2 seconds  Psychiatric: She has a normal mood and affect  Her behavior is normal  Judgment and thought content normal    Nursing note and vitals reviewed

## 2020-08-03 ENCOUNTER — OFFICE VISIT (OUTPATIENT)
Dept: OBGYN CLINIC | Facility: CLINIC | Age: 30
End: 2020-08-03
Payer: COMMERCIAL

## 2020-08-03 VITALS — WEIGHT: 182 LBS | DIASTOLIC BLOOD PRESSURE: 76 MMHG | SYSTOLIC BLOOD PRESSURE: 118 MMHG | BODY MASS INDEX: 31.24 KG/M2

## 2020-08-03 DIAGNOSIS — M54.40 BILATERAL LOW BACK PAIN WITH SCIATICA, SCIATICA LATERALITY UNSPECIFIED, UNSPECIFIED CHRONICITY: ICD-10-CM

## 2020-08-03 DIAGNOSIS — N92.1 BREAKTHROUGH BLEEDING WITH IUD: ICD-10-CM

## 2020-08-03 DIAGNOSIS — Z97.5 BREAKTHROUGH BLEEDING WITH IUD: ICD-10-CM

## 2020-08-03 PROBLEM — C44.519 BASAL CELL CARCINOMA OF SKIN OF TRUNK, EXCEPT SCROTUM: Status: ACTIVE | Noted: 2020-08-03

## 2020-08-03 PROBLEM — L56.8 ACUTE DERMATITIS DUE TO SOLAR RADIATION: Status: ACTIVE | Noted: 2020-08-03

## 2020-08-03 PROBLEM — L70.8 OTHER ACNE: Status: RESOLVED | Noted: 2020-08-03 | Resolved: 2020-08-03

## 2020-08-03 PROBLEM — Z85.828 PERSONAL HISTORY OF OTHER MALIGNANT NEOPLASM OF SKIN: Status: ACTIVE | Noted: 2020-08-03

## 2020-08-03 PROBLEM — L72.3 SEBACEOUS CYST: Status: ACTIVE | Noted: 2020-08-03

## 2020-08-03 PROBLEM — L70.8 OTHER ACNE: Status: ACTIVE | Noted: 2020-08-03

## 2020-08-03 PROBLEM — O99.013 ANEMIA AFFECTING PREGNANCY IN THIRD TRIMESTER: Status: RESOLVED | Noted: 2020-02-21 | Resolved: 2020-08-03

## 2020-08-03 PROBLEM — D48.5 NEOPLASM OF UNCERTAIN BEHAVIOR OF SKIN: Status: ACTIVE | Noted: 2020-08-03

## 2020-08-03 PROBLEM — L72.3 SEBACEOUS CYST: Status: RESOLVED | Noted: 2020-08-03 | Resolved: 2020-08-03

## 2020-08-03 PROBLEM — D23.9 BENIGN NEOPLASM OF SKIN: Status: ACTIVE | Noted: 2020-08-03

## 2020-08-03 PROBLEM — D22.5 MELANOCYTIC NEVI OF TRUNK: Status: ACTIVE | Noted: 2020-08-03

## 2020-08-03 PROBLEM — Z85.828 PERSONAL HISTORY OF OTHER MALIGNANT NEOPLASM OF SKIN: Status: RESOLVED | Noted: 2020-08-03 | Resolved: 2020-08-03

## 2020-08-03 PROCEDURE — 1036F TOBACCO NON-USER: CPT | Performed by: OBSTETRICS & GYNECOLOGY

## 2020-08-03 PROCEDURE — 99213 OFFICE O/P EST LOW 20 MIN: CPT | Performed by: OBSTETRICS & GYNECOLOGY

## 2020-08-03 RX ORDER — NORGESTIMATE AND ETHINYL ESTRADIOL 0.25-0.035
1 KIT ORAL DAILY
Qty: 28 TABLET | Refills: 0 | Status: SHIPPED | OUTPATIENT
Start: 2020-08-03 | End: 2020-09-01 | Stop reason: SDUPTHER

## 2020-08-03 NOTE — PROGRESS NOTES
Assessment/Plan     Normal postpartum exam      1  Contraception: Yury Kubas IUD - stings not seen, visualized with US, breakthrough bleeding-will do 1 month of Sprintec  2  Annual exam due in October  3  Lactation consult, [address] information discussed  4  Increase activity as tolerated, may resume all normal activity  5  Anticipated return to work: 6 - 12 weeks post partum  Zoe Garza is a 35 y o  female who presents for a postpartum visit  She is 4 Months postpartum following a primary  section, low transverse incision  I have fully reviewed the prenatal and intrapartum course  The delivery was at 39 2 gestational weeks  Anesthesia: spinal  Laceration: n/a  Bleeding staining only  Bowel function is normal  Bladder function is normal  Patient  Has been sexually active  Desired contraception method is IUD  Postpartum depression screening: negative  Seeing her PCP, psychiatrist  Baby's course has been uneventful  Baby is feeding by bottle - Formula      Last Pap : 2018 ; no abnormalities  Gestational Diabetes: no  Pregnancy Complications: none    The following portions of the patient's history were reviewed and updated as appropriate: allergies, current medications, past family history, past medical history, past social history, past surgical history and problem list       Current Outpatient Medications:     amphetamine-dextroamphetamine (ADDERALL) 30 MG tablet, Take 1 tablet (30 mg total) by mouth 2 (two) times a dayMax Daily Amount: 60 mg, Disp: 60 tablet, Rfl: 0    cetirizine (ZyrTEC) 10 mg tablet, Take 10 mg by mouth daily, Disp: , Rfl:     clonazePAM (KlonoPIN) 0 5 mg tablet, Take 1 tablet (0 5 mg total) by mouth 2 (two) times a day as needed for seizures, Disp: 30 tablet, Rfl: 0    Multiple Vitamins-Minerals (MULTIVITAMIN WITH MINERALS) tablet, Take 1 tablet by mouth daily, Disp: , Rfl:     sertraline (ZOLOFT) 50 mg tablet, Take 1 tablet (50 mg total) by mouth daily, Disp: 30 tablet, Rfl: 5    norgestimate-ethinyl estradiol (ORTHO-CYCLEN) 0 25-35 MG-MCG per tablet, Take 1 tablet by mouth daily, Disp: 28 tablet, Rfl: 0    No Known Allergies    Review of Systems  Constitutional: no fever, feels well  Breasts: no complaints of breast pain, breast lump, or nipple discharge  Gastrointestinal: no complaints nausea, vomiting  Genitourinary: as noted in HPI    Neurological: no complaints of headache      Objective      /76   Wt 82 6 kg (182 lb)   BMI 31 24 kg/m²     OBGyn Exam  General: alert and oriented, in no acute distress  Abdomen: soft and nontender  Incision: clean, dry, and intact, well healed

## 2020-08-11 DIAGNOSIS — F90.9 ATTENTION DEFICIT HYPERACTIVITY DISORDER (ADHD), UNSPECIFIED ADHD TYPE: ICD-10-CM

## 2020-08-11 RX ORDER — DEXTROAMPHETAMINE SACCHARATE, AMPHETAMINE ASPARTATE, DEXTROAMPHETAMINE SULFATE AND AMPHETAMINE SULFATE 7.5; 7.5; 7.5; 7.5 MG/1; MG/1; MG/1; MG/1
30 TABLET ORAL 2 TIMES DAILY
Qty: 60 TABLET | Refills: 0 | Status: SHIPPED | OUTPATIENT
Start: 2020-08-11

## 2020-08-31 DIAGNOSIS — N92.1 BREAKTHROUGH BLEEDING WITH IUD: ICD-10-CM

## 2020-08-31 DIAGNOSIS — Z97.5 BREAKTHROUGH BLEEDING WITH IUD: ICD-10-CM

## 2020-09-01 DIAGNOSIS — N92.1 BREAKTHROUGH BLEEDING WITH IUD: ICD-10-CM

## 2020-09-01 DIAGNOSIS — Z97.5 BREAKTHROUGH BLEEDING WITH IUD: ICD-10-CM

## 2020-09-01 RX ORDER — NORGESTIMATE AND ETHINYL ESTRADIOL 0.25-0.035
1 KIT ORAL DAILY
Qty: 28 TABLET | Refills: 0 | Status: SHIPPED | OUTPATIENT
Start: 2020-09-01 | End: 2020-10-23

## 2020-09-01 NOTE — TELEPHONE ENCOUNTER
Pt calling requesting refills on OCP prescribed last month  States she had an IUD inserted after her c/s on 04/02/20 and was having daily bleeding so when she saw Dr Diana Ziegler on 08/03/20 Dr Diana Ziegler prescribed an OCP to try to help with the break through bleeding  Pt states she has been using the OCP daily since it was prescribed and was still having daily light bleeding  States she is currently at the end of the pack at the placebo pills and is now having a medium/heavy true period  Pt requesting refills on OCP so she can attempt to eliminate the break through bleeding in the upcoming month  U[facility] [address]     Please let me know if willing to refill so I can notify pt  Thanks  Routing to on call provider since Dr Diana Ziegler is out of the office

## 2020-09-07 RX ORDER — NORGESTIMATE AND ETHINYL ESTRADIOL 0.25-0.035
KIT ORAL
Qty: 28 TABLET | Refills: 0 | Status: SHIPPED | OUTPATIENT
Start: 2020-09-07 | End: 2020-10-23

## 2020-09-09 DIAGNOSIS — F90.9 ATTENTION DEFICIT HYPERACTIVITY DISORDER (ADHD), UNSPECIFIED ADHD TYPE: ICD-10-CM

## 2020-09-09 RX ORDER — DEXTROAMPHETAMINE SACCHARATE, AMPHETAMINE ASPARTATE, DEXTROAMPHETAMINE SULFATE AND AMPHETAMINE SULFATE 7.5; 7.5; 7.5; 7.5 MG/1; MG/1; MG/1; MG/1
30 TABLET ORAL 2 TIMES DAILY
Qty: 60 TABLET | Refills: 0 | OUTPATIENT
Start: 2020-09-09

## 2020-09-10 RX ORDER — DEXTROAMPHETAMINE SACCHARATE, AMPHETAMINE ASPARTATE, DEXTROAMPHETAMINE SULFATE AND AMPHETAMINE SULFATE 7.5; 7.5; 7.5; 7.5 MG/1; MG/1; MG/1; MG/1
30 TABLET ORAL 2 TIMES DAILY
Qty: 60 TABLET | Refills: 0 | OUTPATIENT
Start: 2020-09-10

## 2020-10-23 ENCOUNTER — OFFICE VISIT (OUTPATIENT)
Dept: URGENT CARE | Facility: CLINIC | Age: 30
End: 2020-10-23
Payer: COMMERCIAL

## 2020-10-23 VITALS
HEART RATE: 88 BPM | TEMPERATURE: 98.5 F | RESPIRATION RATE: 18 BRPM | OXYGEN SATURATION: 98 % | WEIGHT: 166 LBS | HEIGHT: 64 IN | BODY MASS INDEX: 28.34 KG/M2

## 2020-10-23 DIAGNOSIS — J06.9 UPPER RESPIRATORY INFECTION, VIRAL: ICD-10-CM

## 2020-10-23 DIAGNOSIS — J45.41 MODERATE PERSISTENT ASTHMA WITH ACUTE EXACERBATION: Primary | ICD-10-CM

## 2020-10-23 PROCEDURE — 99283 EMERGENCY DEPT VISIT LOW MDM: CPT | Performed by: PHYSICIAN ASSISTANT

## 2020-10-23 PROCEDURE — 99213 OFFICE O/P EST LOW 20 MIN: CPT | Performed by: PHYSICIAN ASSISTANT

## 2020-10-23 PROCEDURE — G0382 LEV 3 HOSP TYPE B ED VISIT: HCPCS | Performed by: PHYSICIAN ASSISTANT

## 2020-10-23 RX ORDER — ALBUTEROL SULFATE 90 UG/1
2 AEROSOL, METERED RESPIRATORY (INHALATION) EVERY 4 HOURS PRN
Qty: 1 INHALER | Refills: 0 | Status: SHIPPED | OUTPATIENT
Start: 2020-10-23 | End: 2020-11-15 | Stop reason: SDUPTHER

## 2020-10-23 RX ORDER — DULOXETIN HYDROCHLORIDE 30 MG/1
30 CAPSULE, DELAYED RELEASE ORAL DAILY
COMMUNITY
End: 2020-11-25 | Stop reason: ALTCHOICE

## 2020-10-23 RX ORDER — METHYLPREDNISOLONE 4 MG/1
TABLET ORAL
Qty: 1 EACH | Refills: 0 | Status: SHIPPED | OUTPATIENT
Start: 2020-10-23 | End: 2021-09-22

## 2020-10-23 RX ORDER — FLUTICASONE PROPIONATE 50 MCG
1 SPRAY, SUSPENSION (ML) NASAL DAILY
Qty: 16 G | Refills: 0 | Status: SHIPPED | OUTPATIENT
Start: 2020-10-23

## 2020-11-04 ENCOUNTER — TELEPHONE (OUTPATIENT)
Dept: OBGYN CLINIC | Facility: CLINIC | Age: 30
End: 2020-11-04

## 2020-11-05 DIAGNOSIS — J45.41 MODERATE PERSISTENT ASTHMA WITH ACUTE EXACERBATION: ICD-10-CM

## 2020-11-05 DIAGNOSIS — Z97.5 BREAKTHROUGH BLEEDING WITH IUD: Primary | ICD-10-CM

## 2020-11-05 DIAGNOSIS — N92.1 BREAKTHROUGH BLEEDING WITH IUD: Primary | ICD-10-CM

## 2020-11-05 RX ORDER — NORGESTIMATE AND ETHINYL ESTRADIOL 0.25-0.035
1 KIT ORAL DAILY
Qty: 84 TABLET | Refills: 3 | Status: SHIPPED | OUTPATIENT
Start: 2020-11-05 | End: 2020-11-25

## 2020-11-14 RX ORDER — ALBUTEROL SULFATE 90 UG/1
AEROSOL, METERED RESPIRATORY (INHALATION)
Qty: 8.5 G | OUTPATIENT
Start: 2020-11-14

## 2020-11-15 DIAGNOSIS — J45.41 MODERATE PERSISTENT ASTHMA WITH ACUTE EXACERBATION: ICD-10-CM

## 2020-11-15 RX ORDER — ALBUTEROL SULFATE 90 UG/1
2 AEROSOL, METERED RESPIRATORY (INHALATION) EVERY 4 HOURS PRN
Qty: 1 INHALER | Refills: 3 | Status: SHIPPED | OUTPATIENT
Start: 2020-11-15

## 2020-11-17 ENCOUNTER — TELEPHONE (OUTPATIENT)
Dept: FAMILY MEDICINE CLINIC | Facility: CLINIC | Age: 30
End: 2020-11-17

## 2020-11-18 DIAGNOSIS — B34.9 VIRAL INFECTION, UNSPECIFIED: ICD-10-CM

## 2020-11-18 DIAGNOSIS — B34.9 VIRAL INFECTION, UNSPECIFIED: Primary | ICD-10-CM

## 2020-11-18 PROCEDURE — U0003 INFECTIOUS AGENT DETECTION BY NUCLEIC ACID (DNA OR RNA); SEVERE ACUTE RESPIRATORY SYNDROME CORONAVIRUS 2 (SARS-COV-2) (CORONAVIRUS DISEASE [COVID-19]), AMPLIFIED PROBE TECHNIQUE, MAKING USE OF HIGH THROUGHPUT TECHNOLOGIES AS DESCRIBED BY CMS-2020-01-R: HCPCS | Performed by: NURSE PRACTITIONER

## 2020-11-19 ENCOUNTER — TELEMEDICINE (OUTPATIENT)
Dept: FAMILY MEDICINE CLINIC | Facility: CLINIC | Age: 30
End: 2020-11-19
Payer: COMMERCIAL

## 2020-11-19 DIAGNOSIS — L60.0 INGROWN NAIL OF GREAT TOE OF LEFT FOOT: ICD-10-CM

## 2020-11-19 DIAGNOSIS — J06.9 ACUTE UPPER RESPIRATORY INFECTION: Primary | ICD-10-CM

## 2020-11-19 DIAGNOSIS — J06.9 ACUTE UPPER RESPIRATORY INFECTION: ICD-10-CM

## 2020-11-19 DIAGNOSIS — J45.21 MILD INTERMITTENT ASTHMA WITH ACUTE EXACERBATION: ICD-10-CM

## 2020-11-19 PROCEDURE — 1036F TOBACCO NON-USER: CPT | Performed by: NURSE PRACTITIONER

## 2020-11-19 PROCEDURE — 99214 OFFICE O/P EST MOD 30 MIN: CPT | Performed by: NURSE PRACTITIONER

## 2020-11-19 RX ORDER — AZITHROMYCIN 250 MG/1
TABLET, FILM COATED ORAL
Qty: 6 TABLET | Refills: 0 | Status: SHIPPED | OUTPATIENT
Start: 2020-11-19 | End: 2020-11-19 | Stop reason: SDUPTHER

## 2020-11-19 RX ORDER — FLUTICASONE PROPIONATE 50 MCG
1 SPRAY, SUSPENSION (ML) NASAL DAILY
Qty: 1 BOTTLE | Refills: 3 | Status: SHIPPED | OUTPATIENT
Start: 2020-11-19 | End: 2021-08-17

## 2020-11-19 RX ORDER — AZITHROMYCIN 250 MG/1
TABLET, FILM COATED ORAL
Qty: 6 TABLET | Refills: 0 | Status: SHIPPED | OUTPATIENT
Start: 2020-11-19 | End: 2020-11-23

## 2020-11-20 DIAGNOSIS — J45.21 MILD INTERMITTENT ASTHMA WITH ACUTE EXACERBATION: Primary | ICD-10-CM

## 2020-11-20 LAB — SARS-COV-2 RNA SPEC QL NAA+PROBE: NOT DETECTED

## 2020-11-22 PROBLEM — L60.0 INGROWN NAIL OF GREAT TOE OF LEFT FOOT: Status: ACTIVE | Noted: 2020-11-22

## 2020-11-25 ENCOUNTER — PROCEDURE VISIT (OUTPATIENT)
Dept: OBGYN CLINIC | Facility: CLINIC | Age: 30
End: 2020-11-25
Payer: COMMERCIAL

## 2020-11-25 VITALS — WEIGHT: 172 LBS | BODY MASS INDEX: 29.52 KG/M2 | SYSTOLIC BLOOD PRESSURE: 124 MMHG | DIASTOLIC BLOOD PRESSURE: 74 MMHG

## 2020-11-25 DIAGNOSIS — N92.1 BREAKTHROUGH BLEEDING WITH IUD: ICD-10-CM

## 2020-11-25 DIAGNOSIS — Z30.432 ENCOUNTER FOR IUD REMOVAL: Primary | ICD-10-CM

## 2020-11-25 DIAGNOSIS — Z97.5 BREAKTHROUGH BLEEDING WITH IUD: ICD-10-CM

## 2020-11-25 DIAGNOSIS — Z30.41 ENCOUNTER FOR SURVEILLANCE OF CONTRACEPTIVE PILLS: ICD-10-CM

## 2020-11-25 PROCEDURE — 58301 REMOVE INTRAUTERINE DEVICE: CPT | Performed by: OBSTETRICS & GYNECOLOGY

## 2020-11-25 RX ORDER — CLONIDINE HYDROCHLORIDE 0.1 MG/1
0.1 TABLET ORAL EVERY 12 HOURS SCHEDULED
COMMUNITY

## 2020-11-25 RX ORDER — NORGESTIMATE AND ETHINYL ESTRADIOL 0.25-0.035
1 KIT ORAL DAILY
Qty: 84 TABLET | Refills: 3 | Status: SHIPPED | OUTPATIENT
Start: 2020-11-25 | End: 2021-07-15 | Stop reason: SDUPTHER

## 2021-01-16 DIAGNOSIS — J45.21 MILD INTERMITTENT ASTHMA WITH ACUTE EXACERBATION: ICD-10-CM

## 2021-01-27 DIAGNOSIS — Z23 ENCOUNTER FOR IMMUNIZATION: ICD-10-CM

## 2021-05-31 DIAGNOSIS — J45.21 MILD INTERMITTENT ASTHMA WITH ACUTE EXACERBATION: ICD-10-CM

## 2021-07-13 ENCOUNTER — OFFICE VISIT (OUTPATIENT)
Dept: URGENT CARE | Facility: MEDICAL CENTER | Age: 31
End: 2021-07-13
Payer: COMMERCIAL

## 2021-07-13 VITALS
HEART RATE: 116 BPM | TEMPERATURE: 98.6 F | WEIGHT: 128 LBS | RESPIRATION RATE: 20 BRPM | OXYGEN SATURATION: 100 % | HEIGHT: 64 IN | BODY MASS INDEX: 21.85 KG/M2

## 2021-07-13 DIAGNOSIS — R50.9 FEVER, UNSPECIFIED FEVER CAUSE: Primary | ICD-10-CM

## 2021-07-13 DIAGNOSIS — H65.193 ACUTE MEE (MIDDLE EAR EFFUSION), BILATERAL: ICD-10-CM

## 2021-07-13 PROCEDURE — U0005 INFEC AGEN DETEC AMPLI PROBE: HCPCS | Performed by: PHYSICIAN ASSISTANT

## 2021-07-13 PROCEDURE — 99203 OFFICE O/P NEW LOW 30 MIN: CPT | Performed by: PHYSICIAN ASSISTANT

## 2021-07-13 PROCEDURE — U0003 INFECTIOUS AGENT DETECTION BY NUCLEIC ACID (DNA OR RNA); SEVERE ACUTE RESPIRATORY SYNDROME CORONAVIRUS 2 (SARS-COV-2) (CORONAVIRUS DISEASE [COVID-19]), AMPLIFIED PROBE TECHNIQUE, MAKING USE OF HIGH THROUGHPUT TECHNOLOGIES AS DESCRIBED BY CMS-2020-01-R: HCPCS | Performed by: PHYSICIAN ASSISTANT

## 2021-07-13 RX ORDER — PREDNISONE 20 MG/1
20 TABLET ORAL DAILY
Qty: 5 TABLET | Refills: 0 | Status: SHIPPED | OUTPATIENT
Start: 2021-07-13 | End: 2021-07-18

## 2021-07-13 NOTE — LETTER
July 13, 2021     Patient: Joann Macias   YOB: 1990   Date of Visit: 7/13/2021       To Whom it May Concern:    Judy Lees was seen in my clinic on 7/13/2021  Please excuse until results available    If you have any questions or concerns, please don't hesitate to call  Sincerely,          Zara Naik PA-C        CC: Kamila Fletcher Altagracia

## 2021-07-13 NOTE — PROGRESS NOTES
[address] [facility]        NAME: Cat Garcia is a 28 y o  female  : 1990    MRN: [id number]  DATE: 2021  TIME: 3:32 PM    Assessment and Plan   Fever, unspecified fever cause [R50 9]  1  Fever, unspecified fever cause  Novel Coronavirus (Covid-19),PCR [facility] - Office Collection   2  Acute SERGE (middle ear effusion), bilateral  predniSONE 20 mg tablet      COVID-19 swab performed due to fever and chills  Advised to isolate and treat symptomatically until results available  Steroids given for bilateral middle ear effusions with nausea  Patient Instructions    Tylenol for pain or fever   steroids as directed   rest and fluids   isolate until results available  Follow up with PCP in 3-5 days  Proceed to  ER if symptoms worsen  Chief Complaint     Chief Complaint   Patient presents with    Earache     x2 days with ear pain    Nausea    Fever     Highest recorded 100 3F    Dizziness         History of Present Illness        Patient is a 81-year-old female who presents today with complaints of fever and chills starting today  She has also had bilateral ear pain, dizziness, nausea for 2 days  Patient was at the beach this past weekend with swimming  Reports clear discharge from both ears  She has not COVID-19 vaccinated  No other upper respiratory symptoms  No abdominal pain or diarrhea  No vomiting  Review of Systems   Review of Systems   Constitutional: Positive for chills and fever  HENT: Positive for ear discharge and ear pain  Negative for congestion and sore throat  Respiratory: Negative for cough and shortness of breath  Gastrointestinal: Positive for nausea  Negative for abdominal pain and vomiting  Neurological: Positive for dizziness           Current Medications       Current Outpatient Medications:     albuterol (PROVENTIL HFA,VENTOLIN HFA) 90 mcg/act inhaler, Inhale 2 puffs every 4 (four) hours as needed for wheezing or shortness of breath, Disp: 1 Inhaler, Rfl: 3    amphetamine-dextroamphetamine (ADDERALL) 30 MG tablet, Take 1 tablet (30 mg total) by mouth 2 (two) times a dayMax Daily Amount: 60 mg, Disp: 60 tablet, Rfl: 0    cetirizine (ZyrTEC) 10 mg tablet, Take 10 mg by mouth daily, Disp: , Rfl:     clonazePAM (KlonoPIN) 0 5 mg tablet, Take 1 tablet (0 5 mg total) by mouth 2 (two) times a day as needed for seizures, Disp: 30 tablet, Rfl: 0    cloNIDine (CATAPRES) 0 1 mg tablet, Take 0 1 mg by mouth every 12 (twelve) hours, Disp: , Rfl:     Dulera 100-5 MCG/ACT inhaler, INHALE 2 PUFFS BY MOUTH TWICE DAILY   RINSE MOUTH AFTER USE, Disp: 13 g, Rfl: 3    fluticasone (FLONASE) 50 mcg/act nasal spray, 1 spray into each nostril daily, Disp: 16 g, Rfl: 0    Multiple Vitamins-Minerals (MULTIVITAMIN WITH MINERALS) tablet, Take 1 tablet by mouth daily, Disp: , Rfl:     norgestimate-ethinyl estradiol (ORTHO-CYCLEN) 0 25-35 MG-MCG per tablet, Take 1 tablet by mouth daily, Disp: 84 tablet, Rfl: 3    fluticasone (FLONASE) 50 mcg/act nasal spray, 1 spray into each nostril daily (Patient not taking: Reported on 7/13/2021), Disp: 1 Bottle, Rfl: 3    methylPREDNISolone 4 MG tablet therapy pack, Use as directed on package (Patient not taking: Reported on 7/13/2021), Disp: 1 each, Rfl: 0    predniSONE 20 mg tablet, Take 1 tablet (20 mg total) by mouth daily for 5 days, Disp: 5 tablet, Rfl: 0    Current Allergies     Allergies as of 07/13/2021    (No Known Allergies)            The following portions of the patient's history were reviewed and updated as appropriate: allergies, current medications, past family history, past medical history, past social history, past surgical history and problem list      Past Medical History:   Diagnosis Date    Anxiety     Asthma     childhood    Cancer ([facility] [address] )     skin cancer    Depression     Disease of thyroid gland     nodules    Diverticulitis     Hypertension     LAST PREGNANCY    Insomnia     Varicella     childhood Past Surgical History:   Procedure Laterality Date    ADENOIDECTOMY      BACK SURGERY       SECTION      INNER EAR SURGERY      tubes,bilateral    NECK SURGERY      [place]  DELIVERY ONLY N/A 3/26/2019    Procedure:  SECTION (); Surgeon: Nikolai Beard DO;  Location: [facility] ;  Service: Obstetrics    [place]  DELIVERY ONLY N/A 2020    Procedure:  SECTION () REPEAT;  Surgeon: Aletha Feliz MD;  Location: [facility] ;  Service: Obstetrics    TONSILLECTOMY      WISDOM TOOTH EXTRACTION         Family History   Problem Relation Age of Onset    Other Mother         lupus    Hypertension Father     COPD Father     No Known Problems Brother     No Known Problems Maternal Grandmother     No Known Problems Maternal Grandfather     Diabetes Paternal Grandmother         type 2    Obesity Paternal Grandfather     No Known Problems Brother     No Known Problems Daughter     Thyroid cancer Maternal Aunt          Medications have been verified  Objective   Pulse (!) 116   Temp 98 6 °F (37 °C)   Resp 20   Ht 5' 4" (1 626 m)   Wt 58 1 kg (128 lb)   LMP 2021   SpO2 100%   BMI 21 97 kg/m²        Physical Exam     Physical Exam  Constitutional:       General: She is not in acute distress  Appearance: Normal appearance  She is normal weight  She is not ill-appearing, toxic-appearing or diaphoretic  HENT:      Head: Normocephalic and atraumatic  Right Ear: Ear canal normal  A middle ear effusion is present  Tympanic membrane is not erythematous or bulging  Left Ear: Ear canal normal  A middle ear effusion is present  Tympanic membrane is not erythematous or bulging  Nose: Nose normal       Mouth/Throat:      Mouth: Mucous membranes are moist       Pharynx: Oropharynx is clear  Eyes:      Conjunctiva/sclera: Conjunctivae normal       Pupils: Pupils are equal, round, and reactive to light     Cardiovascular:      Rate and Rhythm: Regular rhythm  Tachycardia present  Pulmonary:      Effort: Pulmonary effort is normal       Breath sounds: Normal breath sounds  Abdominal:      General: Bowel sounds are normal  There is no distension  Palpations: Abdomen is soft  Tenderness: There is no abdominal tenderness  Musculoskeletal:      Cervical back: Neck supple  Lymphadenopathy:      Cervical: No cervical adenopathy  Skin:     General: Skin is warm and dry  Neurological:      Mental Status: She is alert

## 2021-07-14 LAB — SARS-COV-2 RNA RESP QL NAA+PROBE: NEGATIVE

## 2021-07-15 DIAGNOSIS — N92.1 BREAKTHROUGH BLEEDING WITH IUD: ICD-10-CM

## 2021-07-15 DIAGNOSIS — Z97.5 BREAKTHROUGH BLEEDING WITH IUD: ICD-10-CM

## 2021-07-15 RX ORDER — NORGESTIMATE AND ETHINYL ESTRADIOL 0.25-0.035
1 KIT ORAL DAILY
Qty: 84 TABLET | Refills: 3 | Status: SHIPPED | OUTPATIENT
Start: 2021-07-15 | End: 2021-09-22

## 2021-08-17 DIAGNOSIS — J06.9 ACUTE UPPER RESPIRATORY INFECTION: ICD-10-CM

## 2021-08-17 DIAGNOSIS — J45.21 MILD INTERMITTENT ASTHMA WITH ACUTE EXACERBATION: ICD-10-CM

## 2021-08-17 RX ORDER — FLUTICASONE PROPIONATE 50 MCG
SPRAY, SUSPENSION (ML) NASAL
Qty: 16 G | Refills: 3 | Status: SHIPPED | OUTPATIENT
Start: 2021-08-17

## 2021-09-22 ENCOUNTER — ANNUAL EXAM (OUTPATIENT)
Dept: OBGYN CLINIC | Facility: CLINIC | Age: 31
End: 2021-09-22
Payer: COMMERCIAL

## 2021-09-22 VITALS — SYSTOLIC BLOOD PRESSURE: 110 MMHG | DIASTOLIC BLOOD PRESSURE: 64 MMHG | BODY MASS INDEX: 24.72 KG/M2 | WEIGHT: 144 LBS

## 2021-09-22 DIAGNOSIS — Z30.41 ENCOUNTER FOR SURVEILLANCE OF CONTRACEPTIVE PILLS: ICD-10-CM

## 2021-09-22 DIAGNOSIS — Z01.419 WELL WOMAN EXAM WITH ROUTINE GYNECOLOGICAL EXAM: Primary | ICD-10-CM

## 2021-09-22 PROCEDURE — G0145 SCR C/V CYTO,THINLAYER,RESCR: HCPCS | Performed by: OBSTETRICS & GYNECOLOGY

## 2021-09-22 PROCEDURE — G0476 HPV COMBO ASSAY CA SCREEN: HCPCS | Performed by: OBSTETRICS & GYNECOLOGY

## 2021-09-22 PROCEDURE — 0503F POSTPARTUM CARE VISIT: CPT | Performed by: OBSTETRICS & GYNECOLOGY

## 2021-09-22 PROCEDURE — 99395 PREV VISIT EST AGE 18-39: CPT | Performed by: OBSTETRICS & GYNECOLOGY

## 2021-09-22 RX ORDER — NORETHINDRONE ACETATE AND ETHINYL ESTRADIOL 1MG-20(21)
1 KIT ORAL DAILY
Qty: 84 TABLET | Refills: 3 | Status: SHIPPED | OUTPATIENT
Start: 2021-09-22

## 2021-09-22 NOTE — PROGRESS NOTES
ASSESSMENT & PLAN:   Diagnoses and all orders for this visit:    Well woman exam with routine gynecological exam  -     Liquid-based pap, screening    Encounter for surveillance of contraceptive pills  -     norethindrone-ethinyl estradiol (JUNEL FE 1/20) 1-20 MG-MCG per tablet; Take 1 tablet by mouth daily        The following were reviewed in today's visit: ASCCP guidelines, Gardisil vaccination, STD testing breast self exam, use and side effects of OCPs, family planning choices, exercise and healthy diet  Patient to return to office in yearly for annual exam      All questions have been answered to her satisfaction  CC:  Annual Gynecologic Examination    HPI: Ivana Martinez is a 28 y o  [id number] who presents for annual gynecologic examination  She has the following concerns:  Break through bleeding with birth control  Periods not regulated  She is getting random bleeding  She is not consistent with taking it at the same time  Will switch from ortho cyclen to junel  Hemorrhoids - OTC medications, colorectal referral      Health Maintenance:    She exercises some  She wears her seatbelt routinely  She is practicing breast self awareness  Patient does try to follow a balanced diet  Past Medical History:   Diagnosis Date    Anxiety     Asthma     childhood    Cancer ([address] )     skin cancer    Depression     Disease of thyroid gland     nodules    Diverticulitis     Hypertension     LAST PREGNANCY    Insomnia     Varicella     childhood       Past Surgical History:   Procedure Laterality Date    ADENOIDECTOMY      BACK SURGERY       SECTION      INNER EAR SURGERY      tubes,bilateral    NECK SURGERY      MO  DELIVERY ONLY N/A 3/26/2019    Procedure:  SECTION ();   Surgeon: Tracie Brizuela DO;  Location: [facility];  Service: Obstetrics    MO  DELIVERY ONLY N/A 2020    Procedure: Dontrell Salinas () REPEAT;  Surgeon: Gayatri Mahan Lluvia Chiu MD;  Location: [facility] ;  Service: Obstetrics    TONSILLECTOMY      WISDOM TOOTH EXTRACTION         Past OB/Gyn History:  Period Cycle (Days): 28  Period Duration (Days): 10  Period Pattern: Regular  Menstrual Flow: Moderate, LightNo LMP recorded  (Menstrual status: Birth Control)  History of sexually transmitted infection Yes, is interested in STD testing today  Patient is not currently sexually active     Birth control:oral contraceptives (estrogen/progesterone)  Gardisil Vaccination completed: yes  Last Pap  2018 : NILM     Family History  Family History   Problem Relation Age of Onset    Other Mother         lupus    Hypertension Father     COPD Father     No Known Problems Brother     No Known Problems Maternal Grandmother     No Known Problems Maternal Grandfather     Diabetes Paternal Grandmother         type 2    Obesity Paternal Grandfather     No Known Problems Brother     No Known Problems Daughter     Thyroid cancer Maternal Aunt        Social History:  Social History     Socioeconomic History    Marital status: Single     Spouse name: Yane Pete    Number of children: Not on file    Years of education: Associates    Highest education level: Not on file   Occupational History    Occupation: Not Employeed   Tobacco Use    Smoking status: Former Smoker     Years: 10 00     Types: Cigarettes     Quit date: 2018     Years since quitting: 3 7    Smokeless tobacco: Never Used   Vaping Use    Vaping Use: Never used   Substance and Sexual Activity    Alcohol use: Yes     Comment: social    Drug use: Never     Types: Opium     Comment: hx IV heroin and pills; 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, then relapse in october 2018; currently on methadone    Sexual activity: Yes     Partners: Male     Birth control/protection: OCP   Other Topics Concern    Not on file   Social History Narrative    Not on file     Social Determinants of Health     Financial Resource Strain:     Difficulty of Paying Living Expenses:    Food Insecurity:     Worried About Running Out of Food in the Last Year:     [address] in the Last Year:    Transportation Needs:     Lack of Transportation (Medical):  Lack of Transportation (Non-Medical):    Physical Activity:     Days of Exercise per Week:     Minutes of Exercise per Session:    Stress:     Feeling of Stress :    Social Connections:     Frequency of Communication with Friends and Family:     Frequency of Social Gatherings with Friends and Family:     Attends Rastafari Services:     Active Member of Clubs or Organizations:     Attends Club or Organization Meetings:     Marital Status:    Intimate Partner Violence:     Fear of Current or Ex-Partner:     Emotionally Abused:     Physically Abused:     Sexually Abused:      Presently lives with children  She feels safe at home  Patient is currently unemployed  Allergies:  No Known Allergies    Medications:    Current Outpatient Medications:     albuterol (PROVENTIL HFA,VENTOLIN HFA) 90 mcg/act inhaler, Inhale 2 puffs every 4 (four) hours as needed for wheezing or shortness of breath, Disp: 1 Inhaler, Rfl: 3    amphetamine-dextroamphetamine (ADDERALL) 30 MG tablet, Take 1 tablet (30 mg total) by mouth 2 (two) times a dayMax Daily Amount: 60 mg, Disp: 60 tablet, Rfl: 0    cetirizine (ZyrTEC) 10 mg tablet, Take 10 mg by mouth daily, Disp: , Rfl:     clonazePAM (KlonoPIN) 0 5 mg tablet, Take 1 tablet (0 5 mg total) by mouth 2 (two) times a day as needed for seizures, Disp: 30 tablet, Rfl: 0    cloNIDine (CATAPRES) 0 1 mg tablet, Take 0 1 mg by mouth every 12 (twelve) hours, Disp: , Rfl:     Dulera 100-5 MCG/ACT inhaler, INHALE 2 PUFFS BY MOUTH TWICE DAILY   RINSE MOUTH AFTER USE, Disp: 13 g, Rfl: 3    fluticasone (FLONASE) 50 mcg/act nasal spray, 1 spray into each nostril daily, Disp: 16 g, Rfl: 0    fluticasone (FLONASE) 50 mcg/act nasal spray, SHAKE LIQUID AND USE 1 SPRAY IN EACH NOSTRIL DAILY, Disp: 16 g, Rfl: 3    Multiple Vitamins-Minerals (MULTIVITAMIN WITH MINERALS) tablet, Take 1 tablet by mouth daily, Disp: , Rfl:     norethindrone-ethinyl estradiol (JUNEL FE 1/20) 1-20 MG-MCG per tablet, Take 1 tablet by mouth daily, Disp: 84 tablet, Rfl: 3    Review of Systems:  Review of Systems   Constitutional: Negative for chills and fever  Respiratory: Negative for cough and shortness of breath  Cardiovascular: Negative for chest pain and palpitations  Gastrointestinal: Positive for abdominal distention  Negative for abdominal pain, blood in stool, constipation, nausea and vomiting  Genitourinary: Positive for menstrual problem  Negative for difficulty urinating, dyspareunia, dysuria, frequency, pelvic pain, urgency, vaginal bleeding, vaginal discharge and vaginal pain  Neurological: Negative for headaches  Physical Exam:  /64   Wt 65 3 kg (144 lb)   Breastfeeding No   BMI 24 72 kg/m²    Physical Exam  Constitutional:       General: She is awake  Appearance: Normal appearance  She is well-developed  Genitourinary:      Pelvic exam was performed with patient in the lithotomy position  Vulva, urethra, bladder, vagina and uterus normal       No vulval lesion, ulcerations or rash noted  No urethral prolapse present  Bladder is not distended or tender  No vaginal discharge, erythema, tenderness, bleeding, atrophy or prolapse  Cervix is parous  No cervical motion tenderness, discharge, lesion or polyp  Uterus is mobile  Uterus is not enlarged, tender or irregular  No uterine mass detected  Uterus is anteverted and regular  No right or left adnexal mass present  Right adnexa not tender or full  Left adnexa not tender or full  Rectum:      External hemorrhoid present  HENT:      Head: Normocephalic and atraumatic  Cardiovascular:      Rate and Rhythm: Normal rate and regular rhythm        Heart sounds: Normal heart sounds  Pulmonary:      Effort: Pulmonary effort is normal  No tachypnea or respiratory distress  Breath sounds: Normal breath sounds  Chest:      Breasts:         Right: Normal  No inverted nipple, mass, nipple discharge, skin change or tenderness  Left: Normal  No inverted nipple, mass, nipple discharge, skin change or tenderness  Abdominal:      General: There is no distension  Palpations: Abdomen is soft  Tenderness: There is no abdominal tenderness  There is no guarding  Musculoskeletal:      Cervical back: Neck supple  Lymphadenopathy:      Upper Body:      Right upper body: No supraclavicular or axillary adenopathy  Left upper body: No supraclavicular or axillary adenopathy  Neurological:      General: No focal deficit present  Mental Status: She is alert and oriented to person, place, and time  Psychiatric:         Mood and Affect: Mood normal          Behavior: Behavior normal          Thought Content: Thought content normal          Judgment: Judgment normal    Vitals reviewed

## 2021-09-22 NOTE — PATIENT INSTRUCTIONS
How the Pill Works    The pill works primarily by stopping ovulation (release of an egg)  Pills are very effective if swallowed at the same time every day and if other instructions regarding concurrent drug use or use during episodes of diarrhea or vomiting are followed  In addition to preventing pregnancy, pills decrease your risk for ovarian cancer, cancer of the lining of the uterus, benign breast masses, and ovarian cysts  Pills decrease menstrual blood loss and menstrual cramps  Starting the Pill    If you are beginning birth control following a pregnancy, you should begin the day of or the day after your , delivery or miscarriage  Otherwise, you may start the  following the beginning of your menstrual cycle  They will be effective after seven continuous days of use  Take one pill a day until you finish the pack, then:   -If your are using a 28-day pack, begin a new pack immediately  Do not skip any days between packs  -If you are using a 21-day pack, stop taking pills for 1 week then start your new pack  Spotting  If you have spotting/bleeding between periods try to take the pill at the same time every day  Spotting will sometimes stop on its own after your body gets used to the pill  If the spotting doesn't stop on its own after 3 cycles, you can come back to the office for a change in your prescription  Missing your Pill  If you forget to take yesterday's pill, take it as soon as you remember and take today's pill at the regular time  The risk of getting pregnant is slight, but you can use a second method of birth control just to be sure  If you miss two pills in a row, take two pills as soon as you remember and two again the next day  You may have some spotting  It is recommended that you use a second method of birth control along with the pills until your next period  If you miss three or more pills in a row, take two pills a day for three days   It is strongly advised that you also use a second method of birth control until your next period  OR  Stop taking the pills from your old pack of pills  Start a new pack of pills the next Sunday (even if you are bleeding)  Use a second method of birth control while you are off pills and for the first two weeks that you are on your new pack  Missing a Period  If you miss a period but you have not missed any pills and you have no signs of pregnancy, it is unlikely that you are pregnant  If you are worried, you can come into the clinic for a pregnancy test  Many women who take the birth control pill miss a period every now and then  If you do become pregnant while on the pill, the risk of having a child with birth defects is slightly increased by taking pills in the first month or two of pregnancy  The magnitude of this risk is not yet defined  Side Effect  You may have few temporary side effects while taking the pills such as nausea, breast tenderness, slight weight gain, bloating or break-through bleeding, usually these will be lessened after you have taken the pill for 2-3 months  You may experience pronounced mood changes while taking the pill, such as depression, irritability, and a change in sex drive  Sometimes switching pill brands can help this  Warning Signs  Be familiar with the pills warning signs:   Severe abdominal pain   Severe chest pain, cough, shortness of breath   Severe headache, dizziness, weakness, or numbness   Eye problems (vision loss or blurring)   Speech problems   Severe leg pain (calf or thigh)    Follow Up  We will typically schedule you to return to the office in 3 months after starting the birth control pill  Usually by 3 months, irregular bleeding and side effects will have resolved  We would also like to check your blood pressure at this visit as birth control pills can increase blood pressure  After your 3 month checkup, annual follow-up is then recommended      Please do not hesitate to contact the office at [phone] with any questions

## 2021-09-24 LAB
HPV HR 12 DNA CVX QL NAA+PROBE: NEGATIVE
HPV16 DNA CVX QL NAA+PROBE: NEGATIVE
HPV18 DNA CVX QL NAA+PROBE: NEGATIVE

## 2021-09-27 NOTE — RESULT ENCOUNTER NOTE
Yamilet Patel,     Your HPV testing is negative  Your pap is still pending, and will be updated soon  Please contact the office at [phone] with any questions       Luiz

## 2021-09-29 LAB
LAB AP GYN PRIMARY INTERPRETATION: NORMAL
Lab: NORMAL

## 2021-10-17 DIAGNOSIS — N92.1 BREAKTHROUGH BLEEDING WITH IUD: ICD-10-CM

## 2021-10-17 DIAGNOSIS — Z97.5 BREAKTHROUGH BLEEDING WITH IUD: ICD-10-CM

## 2021-10-18 RX ORDER — NORGESTIMATE AND ETHINYL ESTRADIOL 0.25-0.035
KIT ORAL
Qty: 84 TABLET | Refills: 3 | Status: SHIPPED | OUTPATIENT
Start: 2021-10-18

## 2022-06-12 ENCOUNTER — HOSPITAL ENCOUNTER (EMERGENCY)
Facility: HOSPITAL | Age: 32
Discharge: HOME | End: 2022-06-12
Attending: EMERGENCY MEDICINE | Admitting: EMERGENCY MEDICINE
Payer: COMMERCIAL

## 2022-06-12 VITALS
RESPIRATION RATE: 18 BRPM | WEIGHT: 144 LBS | HEIGHT: 64 IN | BODY MASS INDEX: 24.59 KG/M2 | DIASTOLIC BLOOD PRESSURE: 62 MMHG | TEMPERATURE: 99 F | SYSTOLIC BLOOD PRESSURE: 109 MMHG | OXYGEN SATURATION: 100 % | HEART RATE: 73 BPM

## 2022-06-12 DIAGNOSIS — E87.6 HYPOKALEMIA: ICD-10-CM

## 2022-06-12 DIAGNOSIS — M79.10 MYALGIA: Primary | ICD-10-CM

## 2022-06-12 LAB
ALBUMIN SERPL BCP-MCNC: 4.3 G/DL (ref 3.5–5)
ALP SERPL-CCNC: 79 U/L (ref 34–104)
ALT SERPL W P-5'-P-CCNC: 8 U/L (ref 7–52)
ANION GAP SERPL CALCULATED.3IONS-SCNC: 7 MMOL/L (ref 4–13)
AST SERPL W P-5'-P-CCNC: 9 U/L (ref 13–39)
BASOPHILS # BLD AUTO: 0.04 THOUSANDS/ΜL (ref 0–0.1)
BASOPHILS NFR BLD AUTO: 1 % (ref 0–1)
BILIRUB SERPL-MCNC: 0.52 MG/DL (ref 0.2–1)
BILIRUB UR QL STRIP: NEGATIVE
BUN SERPL-MCNC: 9 MG/DL (ref 5–25)
CALCIUM SERPL-MCNC: 9.2 MG/DL (ref 8.4–10.2)
CHLORIDE SERPL-SCNC: 104 MMOL/L (ref 96–108)
CLARITY UR: CLEAR
CO2 SERPL-SCNC: 26 MMOL/L (ref 21–32)
COLOR UR: YELLOW
CREAT SERPL-MCNC: 0.7 MG/DL (ref 0.6–1.3)
EOSINOPHIL # BLD AUTO: 0.14 THOUSAND/ΜL (ref 0–0.61)
EOSINOPHIL NFR BLD AUTO: 2 % (ref 0–6)
ERYTHROCYTE [DISTWIDTH] IN BLOOD BY AUTOMATED COUNT: 12.4 % (ref 11.6–15.1)
EXT PREG TEST URINE: NORMAL
EXT. CONTROL ED NAV: NORMAL
GFR SERPL CREATININE-BSD FRML MDRD: 115 ML/MIN/1.73SQ M
GLUCOSE SERPL-MCNC: 85 MG/DL (ref 65–140)
GLUCOSE UR STRIP-MCNC: NEGATIVE MG/DL
HCT VFR BLD AUTO: 36.2 % (ref 34.8–46.1)
HGB BLD-MCNC: 12.5 G/DL (ref 11.5–15.4)
HGB UR QL STRIP.AUTO: NEGATIVE
IMM GRANULOCYTES # BLD AUTO: 0.02 THOUSAND/UL (ref 0–0.2)
IMM GRANULOCYTES NFR BLD AUTO: 0 % (ref 0–2)
KETONES UR STRIP-MCNC: NEGATIVE MG/DL
LEUKOCYTE ESTERASE UR QL STRIP: NEGATIVE
LYMPHOCYTES # BLD AUTO: 2.43 THOUSANDS/ΜL (ref 0.6–4.47)
LYMPHOCYTES NFR BLD AUTO: 39 % (ref 14–44)
MCH RBC QN AUTO: 30.3 PG (ref 26.8–34.3)
MCHC RBC AUTO-ENTMCNC: 34.5 G/DL (ref 31.4–37.4)
MCV RBC AUTO: 88 FL (ref 82–98)
MONOCYTES # BLD AUTO: 0.56 THOUSAND/ΜL (ref 0.17–1.22)
MONOCYTES NFR BLD AUTO: 9 % (ref 4–12)
NEUTROPHILS # BLD AUTO: 3 THOUSANDS/ΜL (ref 1.85–7.62)
NEUTS SEG NFR BLD AUTO: 49 % (ref 43–75)
NITRITE UR QL STRIP: NEGATIVE
NRBC BLD AUTO-RTO: 0 /100 WBCS
PH UR STRIP.AUTO: 7 [PH]
PLATELET # BLD AUTO: 406 THOUSANDS/UL (ref 149–390)
PMV BLD AUTO: 9.5 FL (ref 8.9–12.7)
POTASSIUM SERPL-SCNC: 3.1 MMOL/L (ref 3.5–5.3)
PROT SERPL-MCNC: 7.1 G/DL (ref 6.4–8.4)
PROT UR STRIP-MCNC: NEGATIVE MG/DL
RBC # BLD AUTO: 4.13 MILLION/UL (ref 3.81–5.12)
SODIUM SERPL-SCNC: 137 MMOL/L (ref 135–147)
SP GR UR STRIP.AUTO: 1.01 (ref 1–1.03)
TSH SERPL DL<=0.05 MIU/L-ACNC: 1.61 UIU/ML (ref 0.45–4.5)
UROBILINOGEN UR QL STRIP.AUTO: 0.2 E.U./DL
WBC # BLD AUTO: 6.19 THOUSAND/UL (ref 4.31–10.16)

## 2022-06-12 PROCEDURE — 96361 HYDRATE IV INFUSION ADD-ON: CPT

## 2022-06-12 PROCEDURE — 85025 COMPLETE CBC W/AUTO DIFF WBC: CPT | Performed by: PHYSICIAN ASSISTANT

## 2022-06-12 PROCEDURE — 96374 THER/PROPH/DIAG INJ IV PUSH: CPT

## 2022-06-12 PROCEDURE — 82728 ASSAY OF FERRITIN: CPT | Performed by: PHYSICIAN ASSISTANT

## 2022-06-12 PROCEDURE — 81003 URINALYSIS AUTO W/O SCOPE: CPT | Performed by: PHYSICIAN ASSISTANT

## 2022-06-12 PROCEDURE — 99284 EMERGENCY DEPT VISIT MOD MDM: CPT | Performed by: PHYSICIAN ASSISTANT

## 2022-06-12 PROCEDURE — 84443 ASSAY THYROID STIM HORMONE: CPT | Performed by: PHYSICIAN ASSISTANT

## 2022-06-12 PROCEDURE — 36415 COLL VENOUS BLD VENIPUNCTURE: CPT | Performed by: PHYSICIAN ASSISTANT

## 2022-06-12 PROCEDURE — 83550 IRON BINDING TEST: CPT | Performed by: PHYSICIAN ASSISTANT

## 2022-06-12 PROCEDURE — 80053 COMPREHEN METABOLIC PANEL: CPT | Performed by: PHYSICIAN ASSISTANT

## 2022-06-12 PROCEDURE — 81025 URINE PREGNANCY TEST: CPT | Performed by: PHYSICIAN ASSISTANT

## 2022-06-12 PROCEDURE — 83540 ASSAY OF IRON: CPT | Performed by: PHYSICIAN ASSISTANT

## 2022-06-12 PROCEDURE — 99283 EMERGENCY DEPT VISIT LOW MDM: CPT

## 2022-06-12 RX ORDER — POTASSIUM CHLORIDE 20 MEQ/1
40 TABLET, EXTENDED RELEASE ORAL ONCE
Status: COMPLETED | OUTPATIENT
Start: 2022-06-12 | End: 2022-06-12

## 2022-06-12 RX ORDER — KETOROLAC TROMETHAMINE 30 MG/ML
15 INJECTION, SOLUTION INTRAMUSCULAR; INTRAVENOUS ONCE
Status: COMPLETED | OUTPATIENT
Start: 2022-06-12 | End: 2022-06-12

## 2022-06-12 RX ORDER — METHOCARBAMOL 500 MG/1
500 TABLET, FILM COATED ORAL ONCE
Status: COMPLETED | OUTPATIENT
Start: 2022-06-12 | End: 2022-06-12

## 2022-06-12 RX ORDER — METHOCARBAMOL 500 MG/1
500 TABLET, FILM COATED ORAL 4 TIMES DAILY
Qty: 20 TABLET | Refills: 0 | Status: SHIPPED | OUTPATIENT
Start: 2022-06-12

## 2022-06-12 RX ADMIN — SODIUM CHLORIDE 1000 ML: 0.9 INJECTION, SOLUTION INTRAVENOUS at 16:46

## 2022-06-12 RX ADMIN — POTASSIUM CHLORIDE 40 MEQ: 1500 TABLET, EXTENDED RELEASE ORAL at 18:22

## 2022-06-12 RX ADMIN — KETOROLAC TROMETHAMINE 15 MG: 30 INJECTION, SOLUTION INTRAMUSCULAR at 18:22

## 2022-06-12 RX ADMIN — METHOCARBAMOL 500 MG: 500 TABLET ORAL at 19:21

## 2022-06-12 NOTE — DISCHARGE INSTRUCTIONS
Please return to the emergency department for worsening symptoms including chest pain, shortness of breath, dizziness, lightheadedness, fever greater than 103, severe pain, inability to walk, fainting episodes, etc  Please follow-up with your family practice provider as soon as possible  Please take the muscle relaxer as directed  Please do not use motor machinery or go to work while taking this medication as it can make you drowsy

## 2022-06-13 LAB
FERRITIN SERPL-MCNC: 121 NG/ML (ref 8–388)
IRON SATN MFR SERPL: 31 % (ref 15–50)
IRON SERPL-MCNC: 82 UG/DL (ref 50–170)
TIBC SERPL-MCNC: 264 UG/DL (ref 250–450)

## 2022-06-15 NOTE — ED PROVIDER NOTES
History  Chief Complaint   Patient presents with    Pain     Pt "I am in so much pain everywhere  I cant sleep, I cant eat, I have no energy and I just dont know what is going on  My life is really complicated  And I move in two weeks  My kids are with their  and I figure this would be the best time to come and just get checked out  I was hoping for some blood work and stuff" Pt denies CP and SOB      This is a 76-year-old female with past medical history significant for anemia, hypertension, and prior substance abuse disorder recently tapered off of methadone presenting to the emergency department today for fatigue  She notes for the past 3-4 days she has been extremely tired and has had very little energy  She notes recent life stressors and she is soon moving to [place]  She has not been eating or drinking as she normally does as she has very little appetite  She also has generalized myalgias  She just recently took herself off of her methadone but believe she moved herself off of it too fast   She also notes insomnia but denies any suicidal or homicidal ideations  She denies any chest pain or shortness of breath  She denies any dizziness, lightheadedness, or visual disturbances  She denies any nausea, vomiting, diarrhea, constipation, or urinary symptoms  She is requesting a rheumatologic workup  The patient denies other complaints at this time  History provided by:  Patient   used: No    Fatigue  Severity:  Moderate  Onset quality:  Gradual  Duration:  4 days  Timing:  Constant  Progression:  Worsening  Chronicity:  New  Relieved by:  Nothing  Worsened by:   Activity  Ineffective treatments:  None tried  Associated symptoms: anorexia and myalgias    Associated symptoms: no abdominal pain, no aphasia, no arthralgias, no chest pain, no cough, no diarrhea, no difficulty walking, no dizziness, no drooling, no dysphagia, no dysuria, no numbness in extremities, no falls, no fever, no foul-smelling urine, no frequency, no headaches, no hematochezia, no lethargy, no loss of consciousness, no melena, no nausea, no near-syncope, no seizures, no shortness of breath, no stroke symptoms, no syncope, no urgency, no vision change and no vomiting    Risk factors: anemia        Prior to Admission Medications   Prescriptions Last Dose Informant Patient Reported? Taking? Dulera 100-5 MCG/ACT inhaler   No No   Sig: INHALE 2 PUFFS BY MOUTH TWICE DAILY   RINSE MOUTH AFTER USE   Estarylla 0 25-35 MG-MCG per tablet Not Taking at Unknown time  No No   Sig: TAKE 1 TABLET BY MOUTH DAILY   Patient not taking: Reported on 2022   Multiple Vitamins-Minerals (MULTIVITAMIN WITH MINERALS) tablet   Yes Yes   Sig: Take 1 tablet by mouth daily   albuterol (PROVENTIL HFA,VENTOLIN HFA) 90 mcg/act inhaler   No No   Sig: Inhale 2 puffs every 4 (four) hours as needed for wheezing or shortness of breath   amphetamine-dextroamphetamine (ADDERALL) 30 MG tablet   No Yes   Sig: Take 1 tablet (30 mg total) by mouth 2 (two) times a dayMax Daily Amount: 60 mg   cetirizine (ZyrTEC) 10 mg tablet  Self Yes Yes   Sig: Take 10 mg by mouth daily   cloNIDine (CATAPRES) 0 1 mg tablet 2022 at Unknown time  Yes Yes   Sig: Take 0 1 mg by mouth every 12 (twelve) hours   clonazePAM (KlonoPIN) 0 5 mg tablet 2022 at Unknown time  No Yes   Sig: Take 1 tablet (0 5 mg total) by mouth 2 (two) times a day as needed for seizures   fluticasone (FLONASE) 50 mcg/act nasal spray   No No   Si spray into each nostril daily   fluticasone (FLONASE) 50 mcg/act nasal spray   No Yes   Sig: SHAKE LIQUID AND USE 1 SPRAY IN EACH NOSTRIL DAILY   norethindrone-ethinyl estradiol (JUNEL ) 1-20 MG-MCG per tablet Not Taking at Unknown time  No No   Sig: Take 1 tablet by mouth daily   Patient not taking: Reported on 2022      Facility-Administered Medications: None       Past Medical History:   Diagnosis Date    ADHD     Anemia     Anxiety     Asthma     childhood    Cancer ([facility] [address] )     skin cancer    Depression     Disease of thyroid gland     nodules    Diverticulitis     Hypertension     LAST PREGNANCY    Insomnia     PTSD (post-traumatic stress disorder)     Varicella     childhood       Past Surgical History:   Procedure Laterality Date    ADENOIDECTOMY      BACK SURGERY       SECTION      INNER EAR SURGERY      tubes,bilateral    NECK SURGERY      [place]  DELIVERY ONLY N/A 3/26/2019    Procedure:  SECTION (); Surgeon: Lavon Tirado DO;  Location: [facility];  Service: Obstetrics    [place]  DELIVERY ONLY N/A 2020    Procedure:  SECTION () REPEAT;  Surgeon: Raquel Boyd MD;  Location: [facility];  Service: Obstetrics    TONSILLECTOMY      WISDOM TOOTH EXTRACTION         Family History   Problem Relation Age of Onset    Other Mother         lupus    Hypertension Father     COPD Father     No Known Problems Brother     No Known Problems Maternal Grandmother     No Known Problems Maternal Grandfather     Diabetes Paternal Grandmother         type 2    Obesity Paternal Grandfather     No Known Problems Brother     No Known Problems Daughter     Thyroid cancer Maternal Aunt      I have reviewed and agree with the history as documented      E-Cigarette/Vaping    E-Cigarette Use Never User      E-Cigarette/Vaping Substances    Nicotine No     THC No     CBD No     Flavoring No     Other No     Unknown No      Social History     Tobacco Use    Smoking status: Former Smoker     Years: 10 00     Types: Cigarettes     Quit date:      Years since quittin 4    Smokeless tobacco: Never Used   Vaping Use    Vaping Use: Never used   Substance Use Topics    Alcohol use: Yes     Comment: social    Drug use: Not Currently     Types: Opium     Comment: Last used Methadone: 23       Review of Systems   Constitutional: Positive for appetite change and fatigue  Negative for chills, diaphoresis and fever  HENT: Negative for drooling  Eyes: Negative for visual disturbance  Respiratory: Negative for cough, chest tightness, shortness of breath and wheezing  Cardiovascular: Negative for chest pain, palpitations, leg swelling, syncope and near-syncope  Gastrointestinal: Positive for anorexia  Negative for abdominal pain, constipation, diarrhea, dysphagia, hematochezia, melena, nausea and vomiting  Genitourinary: Negative for dysuria, frequency, urgency, vaginal bleeding, vaginal discharge and vaginal pain  Musculoskeletal: Positive for myalgias  Negative for arthralgias, falls, neck pain and neck stiffness  Skin: Negative for rash and wound  Neurological: Negative for dizziness, seizures, loss of consciousness, syncope, weakness, light-headedness, numbness and headaches  Psychiatric/Behavioral: Negative for confusion  All other systems reviewed and are negative  Physical Exam  Physical Exam  Vitals and nursing note reviewed  Constitutional:       General: She is not in acute distress  Appearance: Normal appearance  She is normal weight  She is not ill-appearing, toxic-appearing or diaphoretic  HENT:      Head: Normocephalic and atraumatic  Nose: Nose normal  No congestion or rhinorrhea  Mouth/Throat:      Mouth: Mucous membranes are moist       Pharynx: No oropharyngeal exudate or posterior oropharyngeal erythema  Eyes:      General: No scleral icterus  Right eye: No discharge  Left eye: No discharge  Extraocular Movements: Extraocular movements intact  Pupils: Pupils are equal, round, and reactive to light  Cardiovascular:      Rate and Rhythm: Normal rate and regular rhythm  Pulses: Normal pulses  Heart sounds: Normal heart sounds  No murmur heard  No friction rub  No gallop  Pulmonary:      Effort: Pulmonary effort is normal  No respiratory distress        Breath sounds: Normal breath sounds  No stridor  No wheezing, rhonchi or rales  Chest:      Chest wall: No tenderness  Abdominal:      General: Abdomen is flat  There is no distension  Palpations: Abdomen is soft  Tenderness: There is no abdominal tenderness  There is no right CVA tenderness, left CVA tenderness, guarding or rebound  Musculoskeletal:         General: Normal range of motion  Cervical back: Normal range of motion  No tenderness  Right lower leg: No edema  Left lower leg: No edema  Skin:     General: Skin is warm and dry  Capillary Refill: Capillary refill takes less than 2 seconds  Coloration: Skin is not jaundiced or pale  Neurological:      General: No focal deficit present  Mental Status: She is alert and oriented to person, place, and time  Mental status is at baseline  Motor: No weakness     Psychiatric:         Mood and Affect: Mood normal          Behavior: Behavior normal          Vital Signs  ED Triage Vitals [06/12/22 1622]   Temperature Pulse Respirations Blood Pressure SpO2   99 °F (37 2 °C) 81 20 122/94 99 %      Temp Source Heart Rate Source Patient Position - Orthostatic VS BP Location FiO2 (%)   Oral Monitor Sitting Right arm --      Pain Score       6           Vitals:    06/12/22 1622 06/12/22 1823   BP: 122/94 109/62   Pulse: 81 73   Patient Position - Orthostatic VS: Sitting          Visual Acuity      ED Medications  Medications   sodium chloride 0 9 % bolus 1,000 mL (0 mL Intravenous Stopped 6/12/22 1825)   potassium chloride (K-DUR,KLOR-CON) CR tablet 40 mEq (40 mEq Oral Given 6/12/22 1822)   ketorolac (TORADOL) injection 15 mg (15 mg Intravenous Given 6/12/22 1822)   methocarbamol (ROBAXIN) tablet 500 mg (500 mg Oral Given 6/12/22 1921)       Diagnostic Studies  Results Reviewed     Procedure Component Value Units Date/Time    Iron Saturation % [[id number]]  (Normal) Collected: 06/12/22 1645    Lab Status: Final result Specimen: Blood from Arm, Left Updated: 06/13/22 0022     Iron Saturation 31 %      TIBC 264 ug/dL      Iron 82 ug/dL     Ferritin [[id number]]  (Normal) Collected: 06/12/22 1645    Lab Status: Final result Specimen: Blood from Arm, Left Updated: 06/13/22 0022     Ferritin 121 ng/mL     UA w Reflex to Microscopic w Reflex to Culture [[id number]]  (Normal) Collected: 06/12/22 1725    Lab Status: Final result Specimen: Urine, Clean Catch Updated: 06/12/22 1737     Color, UA Yellow     Clarity, UA Clear     Specific Gravity, UA 1 015     pH, UA 7 0     Leukocytes, UA Negative     Nitrite, UA Negative     Protein, UA Negative mg/dl      Glucose, UA Negative mg/dl      Ketones, UA Negative mg/dl      Urobilinogen, UA 0 2 E U /dl      Bilirubin, UA Negative     Blood, UA Negative    POCT pregnancy, urine [[id number]]  (Normal) Resulted: 06/12/22 1731    Lab Status: Final result Updated: 06/12/22 1732     EXT PREG TEST UR (Ref: Negative) Negative (-)     Control Valid    TSH [[id number]]  (Normal) Collected: 06/12/22 1645    Lab Status: Final result Specimen: Blood from Arm, Left Updated: 06/12/22 1725     TSH 3RD GENERATON 1 612 uIU/mL     Narrative:      Patients undergoing fluorescein dye angiography may retain small amounts of fluorescein in the body for 48-72 hours post procedure  Samples containing fluorescein can produce falsely depressed TSH values  If the patient had this procedure,a specimen should be resubmitted post fluorescein clearance        Comprehensive metabolic panel [[id number]]  (Abnormal) Collected: 06/12/22 1645    Lab Status: Final result Specimen: Blood from Arm, Left Updated: 06/12/22 1713     Sodium 137 mmol/L      Potassium 3 1 mmol/L      Chloride 104 mmol/L      CO2 26 mmol/L      ANION GAP 7 mmol/L      BUN 9 mg/dL      Creatinine 0 70 mg/dL      Glucose 85 mg/dL      Calcium 9 2 mg/dL      AST 9 U/L      ALT 8 U/L      Alkaline Phosphatase 79 U/L      Total Protein 7 1 g/dL      Albumin 4 3 g/dL      Total Bilirubin 0 52 mg/dL      eGFR 115 ml/min/1 73sq m     Narrative:      [place] guidelines for Chronic Kidney Disease (CKD):     Stage 1 with normal or high GFR (GFR > 90 mL/min/1 73 square meters)    Stage 2 Mild CKD (GFR = 60-89 mL/min/1 73 square meters)    Stage 3A Moderate CKD (GFR = 45-59 mL/min/1 73 square meters)    Stage 3B Moderate CKD (GFR = 30-44 mL/min/1 73 square meters)    Stage 4 Severe CKD (GFR = 15-29 mL/min/1 73 square meters)    Stage 5 End Stage CKD (GFR <15 mL/min/1 73 square meters)  Note: GFR calculation is accurate only with a steady state creatinine    CBC and differential [[id number]]  (Abnormal) Collected: 06/12/22 1645    Lab Status: Final result Specimen: Blood from Arm, Left Updated: 06/12/22 1653     WBC 6 19 Thousand/uL      RBC 4 13 Million/uL      Hemoglobin 12 5 g/dL      Hematocrit 36 2 %      MCV 88 fL      MCH 30 3 pg      MCHC 34 5 g/dL      RDW 12 4 %      MPV 9 5 fL      Platelets 604 Thousands/uL      nRBC 0 /100 WBCs      Neutrophils Relative 49 %      Immat GRANS % 0 %      Lymphocytes Relative 39 %      Monocytes Relative 9 %      Eosinophils Relative 2 %      Basophils Relative 1 %      Neutrophils Absolute 3 00 Thousands/µL      Immature Grans Absolute 0 02 Thousand/uL      Lymphocytes Absolute 2 43 Thousands/µL      Monocytes Absolute 0 56 Thousand/µL      Eosinophils Absolute 0 14 Thousand/µL      Basophils Absolute 0 04 Thousands/µL                  No orders to display              Procedures  Procedures         ED Course  ED Course as of 06/15/22 0818   Sun Jun 12, 2022   1721 Potassium(!): 3 1  Will replete in the ED with 40 mEq Klor-Con                                             MDM  Number of Diagnoses or Management Options  Hypokalemia: new and does not require workup  Myalgia: new and requires workup  Diagnosis management comments: This is a 33year old female presenting to the emergency department today for fatigue    Associated with general myalgias, sleeplessness, and anorexia  She has recent life stressors and rapidly tapered herself off of her methadone  She also notes jitteriness  Physical exam is within normal limits  Vital signs are also within normal limits  Patient is refusing COVID testing  Lab work is grossly within normal limits  The patient was given Toradol here in the emergency department with some relief  Will send a muscle relaxer home for the patient  She does not want rheumatologic follow-up as she is moving to [place] soon  The patient is stable for discharge at this time  Likely secondary to rapid removal off methadone  Recommend PCP follow-up  Robaxin sent to the patient's pharmacy  She is aware not to drive or go to work while taking this medication as it can make her drowsy  Strict return precautions were given  Recommend PCP follow-up as soon as possible  The patient and/or patient's proxy verify their understanding and agree to the plan at this time  All questions answered to the patient and/or their proxy's satisfaction  All labs reviewed and utilized in the medical decision making process  All radiology studies independently viewed by me and interpreted by the radiologist  Portions of the record may have been created with voice recognition software   Occasional wrong word or "sound a like" substitutions may have occurred due to the inherent limitations of voice recognition software   Read the chart carefully and recognize, using context, where substitutions have occurred  Hypokalemia repletion occurred here         Amount and/or Complexity of Data Reviewed  Clinical lab tests: ordered and reviewed  Review and summarize past medical records: yes    Patient Progress  Patient progress: stable      Disposition  Final diagnoses:   Myalgia     Time reflects when diagnosis was documented in both MDM as applicable and the Disposition within this note     Time User Action Codes Description Comment 6/12/2022  6:51 PM Druckenmiller, Luster Aase Add [M79 10] Myalgia       ED Disposition     ED Disposition   Discharge    Condition   Stable    Date/Time   Sun Jun 12, 2022  6:51 PM    Comment   [place] discharge to home/self care                 Follow-up Information     Follow up With Specialties Details Why Contact Info Additional Information    Carmen Vazquez, [address], Internal Medicine, Nurse Practitioner Schedule an appointment as soon as possible for a visit   [address] [place] [address] [phone]       [address] Emergency Department Emergency Medicine Go to  If symptoms worsen [address] [facility] [facility],[address] [id number]  [address] [facility] Emergency Department, [address], [address]          Discharge Medication List as of 6/12/2022  6:53 PM      START taking these medications    Details   methocarbamol (ROBAXIN) 500 mg tablet Take 1 tablet (500 mg total) by mouth 4 (four) times a day, Starting Sun 6/12/2022, Normal         CONTINUE these medications which have NOT CHANGED    Details   amphetamine-dextroamphetamine (ADDERALL) 30 MG tablet Take 1 tablet (30 mg total) by mouth 2 (two) times a dayMax Daily Amount: 60 mg, Starting Tue 8/11/2020, Normal      cetirizine (ZyrTEC) 10 mg tablet Take 10 mg by mouth daily, Historical Med      clonazePAM (KlonoPIN) 0 5 mg tablet Take 1 tablet (0 5 mg total) by mouth 2 (two) times a day as needed for seizures, Starting Wed 7/15/2020, Normal      cloNIDine (CATAPRES) 0 1 mg tablet Take 0 1 mg by mouth every 12 (twelve) hours, Historical Med      !! fluticasone (FLONASE) 50 mcg/act nasal spray SHAKE LIQUID AND USE 1 SPRAY IN EACH NOSTRIL DAILY, Normal      Multiple Vitamins-Minerals (MULTIVITAMIN WITH MINERALS) tablet Take 1 tablet by mouth daily, Historical Med      albuterol (PROVENTIL HFA,VENTOLIN HFA) 90 mcg/act inhaler Inhale 2 puffs every 4 (four) hours as needed for wheezing or shortness of breath, Starting Sun 11/15/2020, Normal      Dulera 100-5 MCG/ACT inhaler INHALE 2 PUFFS BY MOUTH TWICE DAILY  RINSE MOUTH AFTER USE, Normal      Estarylla 0 25-35 MG-MCG per tablet TAKE 1 TABLET BY MOUTH DAILY, Normal      !! fluticasone (FLONASE) 50 mcg/act nasal spray 1 spray into each nostril daily, Starting Fri 10/23/2020, Normal      norethindrone-ethinyl estradiol (JUNEL FE 1/20) 1-20 MG-MCG per tablet Take 1 tablet by mouth daily, Starting Wed 9/22/2021, Normal       !! - Potential duplicate medications found  Please discuss with provider  No discharge procedures on file      PDMP Review       Value Time User    PDMP Reviewed  Yes 7/15/2020  4:51 PM Rylie Escalera, [address]          ED Provider  Electronically Signed by           Benny Cheema PA-C  06/15/22 Ca[address] 24ANA  06/15/22 0770

## 2022-06-28 ENCOUNTER — HOSPITAL ENCOUNTER (EMERGENCY)
Facility: HOSPITAL | Age: 32
Discharge: HOME | End: 2022-06-28
Attending: EMERGENCY MEDICINE
Payer: COMMERCIAL

## 2022-06-28 VITALS
OXYGEN SATURATION: 100 % | HEIGHT: 64 IN | WEIGHT: 135 LBS | SYSTOLIC BLOOD PRESSURE: 112 MMHG | HEART RATE: 86 BPM | DIASTOLIC BLOOD PRESSURE: 82 MMHG | RESPIRATION RATE: 16 BRPM | TEMPERATURE: 97.8 F | BODY MASS INDEX: 23.05 KG/M2

## 2022-06-28 DIAGNOSIS — N89.8 VAGINAL ODOR: ICD-10-CM

## 2022-06-28 DIAGNOSIS — B37.31 VAGINAL CANDIDA: ICD-10-CM

## 2022-06-28 DIAGNOSIS — T19.2XXA: Primary | ICD-10-CM

## 2022-06-28 LAB
EXT PREG TEST URINE: NORMAL
EXT. CONTROL ED NAV: NORMAL

## 2022-06-28 PROCEDURE — 81025 URINE PREGNANCY TEST: CPT | Performed by: EMERGENCY MEDICINE

## 2022-06-28 PROCEDURE — 99284 EMERGENCY DEPT VISIT MOD MDM: CPT | Performed by: EMERGENCY MEDICINE

## 2022-06-28 PROCEDURE — 96372 THER/PROPH/DIAG INJ SC/IM: CPT

## 2022-06-28 PROCEDURE — 87491 CHLMYD TRACH DNA AMP PROBE: CPT | Performed by: EMERGENCY MEDICINE

## 2022-06-28 PROCEDURE — 99283 EMERGENCY DEPT VISIT LOW MDM: CPT

## 2022-06-28 PROCEDURE — 81514 NFCT DS BV&VAGINITIS DNA ALG: CPT | Performed by: EMERGENCY MEDICINE

## 2022-06-28 PROCEDURE — 87591 N.GONORRHOEAE DNA AMP PROB: CPT | Performed by: EMERGENCY MEDICINE

## 2022-06-28 RX ORDER — DOXYCYCLINE HYCLATE 100 MG/1
100 CAPSULE ORAL EVERY 12 HOURS SCHEDULED
Qty: 13 CAPSULE | Refills: 0 | Status: SHIPPED | OUTPATIENT
Start: 2022-06-28 | End: 2022-07-05

## 2022-06-28 RX ORDER — DOXYCYCLINE HYCLATE 100 MG/1
100 CAPSULE ORAL ONCE
Status: COMPLETED | OUTPATIENT
Start: 2022-06-28 | End: 2022-06-28

## 2022-06-28 RX ORDER — METRONIDAZOLE 500 MG/1
500 TABLET ORAL EVERY 12 HOURS SCHEDULED
Qty: 13 TABLET | Refills: 0 | Status: SHIPPED | OUTPATIENT
Start: 2022-06-28 | End: 2022-07-05

## 2022-06-28 RX ADMIN — LIDOCAINE HYDROCHLORIDE 500 MG: 10 INJECTION, SOLUTION EPIDURAL; INFILTRATION; INTRACAUDAL; PERINEURAL at 07:57

## 2022-06-28 RX ADMIN — DOXYCYCLINE 100 MG: 100 CAPSULE ORAL at 07:56

## 2022-06-28 NOTE — DISCHARGE INSTRUCTIONS
Follow-up with primary care provider/ OBGYN  Take the antibiotics as prescribed  Come back to the ED if you have new or worsening symptoms such as increasing pelvic pain, abdominal pain, fevers

## 2022-06-28 NOTE — ED PROVIDER NOTES
History  Chief Complaint   Patient presents with    Exposure to STD     Reports she did not urinate after sex and her partner has multiple other partners  Unknown if this partner has any known STDs  States her discharge has an abnormal smell but no other symptoms  Unsure if she used a condom with this partner, but smell started 24hrs after last intercourse  34 y/o female hx of ADHD, anxiety, diverticulitis, HTN presents for STI concern  Notes fish smell from her vagina  No dysuria, hematuria, increased frequency of urination  No vaginal sores  Has had 2 male sexual contacts recently  One was without a condom, though she is unsure if there was vaginal penetration  The other sexual activity resulted in the patient having vaginal bleeding which has since stopped  Denies vaginal discharge  Denies fevers, chills, nausea, vomiting, abdominal pain  Notes very small amount of pelvic pain centrally  Nothing in the lateral aspects  Unsure when LMP  Not on OCP  Nothing makes vaginal odor better or worse  Has been using new soaps when bathing  Prior to Admission Medications   Prescriptions Last Dose Informant Patient Reported? Taking? Dulera 100-5 MCG/ACT inhaler   No No   Sig: INHALE 2 PUFFS BY MOUTH TWICE DAILY   RINSE MOUTH AFTER USE   Estarylla 0 25-35 MG-MCG per tablet   No No   Sig: TAKE 1 TABLET BY MOUTH DAILY   Patient not taking: Reported on 6/12/2022   Multiple Vitamins-Minerals (MULTIVITAMIN WITH MINERALS) tablet   Yes No   Sig: Take 1 tablet by mouth daily   albuterol (PROVENTIL HFA,VENTOLIN HFA) 90 mcg/act inhaler   No No   Sig: Inhale 2 puffs every 4 (four) hours as needed for wheezing or shortness of breath   amphetamine-dextroamphetamine (ADDERALL) 30 MG tablet   No No   Sig: Take 1 tablet (30 mg total) by mouth 2 (two) times a dayMax Daily Amount: 60 mg   cetirizine (ZyrTEC) 10 mg tablet  Self Yes No   Sig: Take 10 mg by mouth daily   cloNIDine (CATAPRES) 0 1 mg tablet   Yes No   Sig: Take 0 1 mg by mouth every 12 (twelve) hours   clonazePAM (KlonoPIN) 0 5 mg tablet   No No   Sig: Take 1 tablet (0 5 mg total) by mouth 2 (two) times a day as needed for seizures   fluticasone (FLONASE) 50 mcg/act nasal spray   No No   Si spray into each nostril daily   fluticasone (FLONASE) 50 mcg/act nasal spray   No No   Sig: SHAKE LIQUID AND USE 1 SPRAY IN EACH NOSTRIL DAILY   methocarbamol (ROBAXIN) 500 mg tablet   No No   Sig: Take 1 tablet (500 mg total) by mouth 4 (four) times a day   norethindrone-ethinyl estradiol (L ) 1-20 MG-MCG per tablet   No No   Sig: Take 1 tablet by mouth daily   Patient not taking: Reported on 2022      Facility-Administered Medications: None       Past Medical History:   Diagnosis Date    ADHD     Anemia     Anxiety     Asthma     childhood    Cancer ([facility] [address] )     skin cancer    Depression     Disease of thyroid gland     nodules    Diverticulitis     Hypertension     LAST PREGNANCY    Insomnia     PTSD (post-traumatic stress disorder)     Varicella     childhood       Past Surgical History:   Procedure Laterality Date    ADENOIDECTOMY      BACK SURGERY       SECTION      INNER EAR SURGERY      tubes,bilateral    NECK SURGERY      [facility]    Procedure:  SECTION ();   Surgeon: Trevin Gerber DO;  Location: [facility];  Service: Obstetrics    [facility] N/A 2020    Procedure:  SECTION () REPEAT;  Surgeon: Kristal Browning MD;  Location: [facility] ;  Service: Obstetrics    TONSILLECTOMY      WISDOM TOOTH EXTRACTION         Family History   Problem Relation Age of Onset    Other Mother         lupus    Hypertension Father     COPD Father     No Known Problems Brother     No Known Problems Maternal Grandmother     No Known Problems Maternal Grandfather     Diabetes Paternal Grandmother         type 2    Obesity Paternal Grandfather     No Known Problems Brother     No Known Problems Daughter     Thyroid cancer Maternal Aunt      I have reviewed and agree with the history as documented  E-Cigarette/Vaping    E-Cigarette Use Never User      E-Cigarette/Vaping Substances    Nicotine No     THC No     CBD No     Flavoring No     Other No     Unknown No      Social History     Tobacco Use    Smoking status: Former Smoker     Years: 10 00     Types: Cigarettes     Quit date:      Years since quittin 4    Smokeless tobacco: Never Used   Vaping Use    Vaping Use: Never used   Substance Use Topics    Alcohol use: Yes     Comment: social    Drug use: Not Currently     Types: Opium, Marijuana     Comment: Last used Methadone: 23       Review of Systems   Genitourinary: Positive for vaginal bleeding and vaginal pain  All other systems reviewed and are negative  Physical Exam  Physical Exam  Vitals and nursing note reviewed  Constitutional:       General: She is not in acute distress  Appearance: Normal appearance  She is not ill-appearing  HENT:      Head: Normocephalic and atraumatic  Right Ear: External ear normal       Left Ear: External ear normal       Nose: Nose normal       Mouth/Throat:      Mouth: Mucous membranes are moist    Eyes:      General:         Right eye: No discharge  Left eye: No discharge  Conjunctiva/sclera: Conjunctivae normal    Cardiovascular:      Rate and Rhythm: Normal rate and regular rhythm  Pulses: Normal pulses  Heart sounds: No murmur heard  Pulmonary:      Effort: Pulmonary effort is normal       Breath sounds: Normal breath sounds  Abdominal:      General: Abdomen is flat  There is no distension  Tenderness: There is abdominal tenderness (very mild tenderness to suprapubic area)  There is no guarding or rebound  Genitourinary:     General: Normal vulva  Vagina: No vaginal discharge        Comments: Retained tampon within the vagina near the cervix    No intravaginal lesions or abrasions    Fish like odor  Musculoskeletal:         General: Normal range of motion  Cervical back: Normal range of motion  Skin:     General: Skin is warm  Capillary Refill: Capillary refill takes less than 2 seconds  Findings: No rash  Neurological:      General: No focal deficit present  Mental Status: She is alert  Mental status is at baseline  Psychiatric:         Mood and Affect: Mood normal          Behavior: Behavior normal          Vital Signs  ED Triage Vitals   Temperature Pulse Respirations Blood Pressure SpO2   06/28/22 0706 06/28/22 0700 06/28/22 0700 06/28/22 0700 06/28/22 0700   97 8 °F (36 6 °C) 86 16 112/82 100 %      Temp Source Heart Rate Source Patient Position - Orthostatic VS BP Location FiO2 (%)   06/28/22 0706 06/28/22 0700 06/28/22 0700 06/28/22 0700 --   Oral Monitor Sitting Left arm       Pain Score       06/28/22 0700       No Pain           Vitals:    06/28/22 0700   BP: 112/82   Pulse: 86   Patient Position - Orthostatic VS: Sitting         Visual Acuity      ED Medications  Medications   cefTRIAXone (ROCEPHIN) 500 mg in lidocaine (PF) (XYLOCAINE-MPF) 1 % IM only syringe (has no administration in time range)   doxycycline hyclate (VIBRAMYCIN) capsule 100 mg (has no administration in time range)       Diagnostic Studies  Results Reviewed     Procedure Component Value Units Date/Time    POCT pregnancy, urine [[id number]]  (Normal) Resulted: 06/28/22 0742    Lab Status: Final result Updated: 06/28/22 0742     EXT PREG TEST UR (Ref: Negative) negative -     Control valid    Molecular Vaginal Panel [[id number]] Collected: 06/28/22 0736    Lab Status: In process Specimen: Genital from Vaginal Updated: 06/28/22 0738    Chlamydia/GC amplified DNA by PCR [[id number]] Collected: 06/28/22 0736    Lab Status:  In process Specimen: Cervix Updated: 06/28/22 0738                 No orders to display              Procedures  Procedures ED Course                                             MDM  Number of Diagnoses or Management Options  Retained tampon: new and requires workup  Vaginal odor: new and requires workup  Diagnosis management comments: Mild vaginal pain likely from retained tampon  Patient with vaginal pain and fish odor  Likely bacterial vaginosis  Will treat as bacterial vaginosis and send molecular vaginal probe  Will treat for gonorrhea and chlamydia as well  Will send GC testing  Patient has a retained tampon deep within the vagina  Removed  Fish like smell, but otherwise normal exam   No cervical motion tenderness or signs of significant infectious process  No signs of toxic shock syndrome  Abdomen is soft/ nontender except for a small amount of tenderness over suprapubic area  No urinary symptoms/ signs of UTI  Will check for pregnancy  Negative  Will treat with doxycycline, Flagyl, ceftriaxone  Amount and/or Complexity of Data Reviewed  Clinical lab tests: ordered and reviewed  Decide to obtain previous medical records or to obtain history from someone other than the patient: yes  Review and summarize past medical records: yes    Risk of Complications, Morbidity, and/or Mortality  Presenting problems: low  Diagnostic procedures: low  Management options: low    Patient Progress  Patient progress: stable      Disposition  Final diagnoses:   Retained tampon   Vaginal odor     Time reflects when diagnosis was documented in both MDM as applicable and the Disposition within this note     Time User Action Codes Description Comment    6/28/2022  7:40 AM Isaias Gutierrez Add Bertis [place]  2XXA] Retained tampon     6/28/2022  7:40 AM Ryan Valle Add [N89 8] Vaginal odor       ED Disposition     ED Disposition   Discharge    Condition   Stable    Date/Time   Tue Jun 28, 2022  7:42 AM    Comment   Heather Alexis discharge to home/self care                 Follow-up Information     Follow up With Specialties Details Why Contact Info Additional Information    Coreen Cuevas, [address], Internal Medicine, Nurse Practitioner  For re-evaluation as soon as possible [facility]  [facility] [id number]       [address] Emergency Department Emergency Medicine  If symptoms worsen [address] [address]  [address] [facility] Emergency Department, [address], [place] [address]          Patient's Medications   Discharge Prescriptions    DOXYCYCLINE HYCLATE (VIBRAMYCIN) 100 MG CAPSULE    Take 1 capsule (100 mg total) by mouth every 12 (twelve) hours for 13 doses       Start Date: 6/28/2022 End Date: 7/5/2022       Order Dose: 100 mg       Quantity: 13 capsule    Refills: 0    METRONIDAZOLE (FLAGYL) 500 MG TABLET    Take 1 tablet (500 mg total) by mouth every 12 (twelve) hours for 13 doses       Start Date: 6/28/2022 End Date: 7/5/2022       Order Dose: 500 mg       Quantity: 13 tablet    Refills: 0       No discharge procedures on file      PDMP Review       Value Time User    PDMP Reviewed  Yes 7/15/2020  4:51 PM Coreen Cuevas, [address]          ED Provider  Electronically Signed by           Peter Grace DO  06/28/22 5999

## 2022-06-29 LAB
C GLABRATA DNA VAG QL NAA+PROBE: POSITIVE
C KRUSEI DNA VAG QL NAA+PROBE: NEGATIVE
C TRACH DNA SPEC QL NAA+PROBE: NEGATIVE
CANDIDA SP 6 PNL VAG NAA+PROBE: NEGATIVE
N GONORRHOEA DNA SPEC QL NAA+PROBE: NEGATIVE
T VAGINALIS DNA VAG QL NAA+PROBE: NEGATIVE
VAGINOSIS/ITIS DNA PNL VAG PROBE+SIG AMP: POSITIVE

## 2022-06-29 RX ORDER — FLUCONAZOLE 150 MG/1
150 TABLET ORAL ONCE
Qty: 1 TABLET | Refills: 0 | Status: SHIPPED | OUTPATIENT
Start: 2022-06-29 | End: 2022-06-29

## 2025-03-26 NOTE — TELEPHONE ENCOUNTER
37w5d OB states she called this weekend with elevated BP's was advised to report to triage, but did not go  She has an appt today with [facility]  Wants to know if provider can order preeclampsia labs so the results will be at [facility] for her appt today  Routing to provider for advise  175

## (undated) DEVICE — SUT VICRYL 0 CT-1 36 IN J946H

## (undated) DEVICE — GLOVE INDICATOR PI UNDERGLOVE SZ 7.5 BLUE

## (undated) DEVICE — SUT MONOCRYL 0 CTX 36 IN Y398H

## (undated) DEVICE — SUT PLAIN 3-0 CT-1 27 IN 842H

## (undated) DEVICE — PACK C-SECTION PBDS

## (undated) DEVICE — Device

## (undated) DEVICE — SUT MONOCRYL 4-0 PS-2 27 IN Y426H

## (undated) DEVICE — SKIN MARKER DUAL TIP WITH RULER CAP, FLEXIBLE RULER AND LABELS: Brand: DEVON

## (undated) DEVICE — GLOVE SRG BIOGEL ECLIPSE 7

## (undated) DEVICE — TELFA NON-ADHERENT ABSORBENT DRESSING: Brand: TELFA

## (undated) DEVICE — CHLORAPREP HI-LITE 26ML ORANGE

## (undated) DEVICE — GLOVE SRG BIOGEL ECLIPSE 6.5

## (undated) DEVICE — ADHESIVE SKN CLSR HISTOACRYL FLEX 0.5ML LF

## (undated) DEVICE — SUT VICRYL 0 CTX 36 IN J978H